# Patient Record
Sex: MALE | Race: WHITE | Employment: OTHER | URBAN - METROPOLITAN AREA
[De-identification: names, ages, dates, MRNs, and addresses within clinical notes are randomized per-mention and may not be internally consistent; named-entity substitution may affect disease eponyms.]

---

## 2017-08-02 PROBLEM — M17.12 PRIMARY OSTEOARTHRITIS OF LEFT KNEE: Status: ACTIVE | Noted: 2017-08-02

## 2017-10-12 ENCOUNTER — TELEPHONE (OUTPATIENT)
Dept: ORTHOPEDIC SURGERY | Age: 67
End: 2017-10-12

## 2018-02-12 ENCOUNTER — TELEPHONE (OUTPATIENT)
Dept: ORTHOPEDIC SURGERY | Age: 68
End: 2018-02-12

## 2018-02-20 RX ORDER — SODIUM CHLORIDE, SODIUM LACTATE, POTASSIUM CHLORIDE, CALCIUM CHLORIDE 600; 310; 30; 20 MG/100ML; MG/100ML; MG/100ML; MG/100ML
INJECTION, SOLUTION INTRAVENOUS CONTINUOUS
Status: CANCELLED | OUTPATIENT
Start: 2018-03-11

## 2018-02-20 RX ORDER — DEXAMETHASONE SODIUM PHOSPHATE 4 MG/ML
10 INJECTION, SOLUTION INTRA-ARTICULAR; INTRALESIONAL; INTRAMUSCULAR; INTRAVENOUS; SOFT TISSUE ONCE
Status: CANCELLED | OUTPATIENT
Start: 2018-03-11 | End: 2018-03-11

## 2018-02-20 RX ORDER — OXYCODONE HCL 10 MG/1
10 TABLET, FILM COATED, EXTENDED RELEASE ORAL
Status: CANCELLED | OUTPATIENT
Start: 2018-03-11 | End: 2018-03-11

## 2018-02-21 ENCOUNTER — HOSPITAL ENCOUNTER (OUTPATIENT)
Dept: PREADMISSION TESTING | Age: 68
Discharge: HOME | End: 2018-02-21
Attending: ORTHOPAEDIC SURGERY | Admitting: ORTHOPAEDIC SURGERY

## 2018-02-21 VITALS
WEIGHT: 223 LBS | HEIGHT: 73 IN | RESPIRATION RATE: 16 BRPM | BODY MASS INDEX: 29.55 KG/M2 | TEMPERATURE: 97.4 F | SYSTOLIC BLOOD PRESSURE: 125 MMHG | DIASTOLIC BLOOD PRESSURE: 79 MMHG | HEART RATE: 54 BPM | OXYGEN SATURATION: 97 %

## 2018-02-21 LAB
ABO/RH: NORMAL
ALBUMIN SERPL-MCNC: 4.6 G/DL (ref 3.4–5)
ALP BLD-CCNC: 61 U/L (ref 40–129)
ALT SERPL-CCNC: 14 U/L (ref 10–40)
ANION GAP SERPL CALCULATED.3IONS-SCNC: 10 MMOL/L (ref 3–16)
ANTIBODY SCREEN: NORMAL
APTT: 33.6 SEC (ref 24.1–34.9)
AST SERPL-CCNC: 19 U/L (ref 15–37)
BASOPHILS ABSOLUTE: 0.1 K/UL (ref 0–0.2)
BASOPHILS RELATIVE PERCENT: 1.8 %
BILIRUB SERPL-MCNC: 0.6 MG/DL (ref 0–1)
BILIRUBIN DIRECT: <0.2 MG/DL (ref 0–0.3)
BILIRUBIN URINE: NEGATIVE
BILIRUBIN, INDIRECT: NORMAL MG/DL (ref 0–1)
BLOOD, URINE: NEGATIVE
BUN BLDV-MCNC: 26 MG/DL (ref 7–20)
CALCIUM SERPL-MCNC: 9.1 MG/DL (ref 8.3–10.6)
CHLORIDE BLD-SCNC: 100 MMOL/L (ref 99–110)
CLARITY: CLEAR
CO2: 28 MMOL/L (ref 21–32)
COLOR: YELLOW
CREAT SERPL-MCNC: 0.7 MG/DL (ref 0.8–1.3)
EOSINOPHILS ABSOLUTE: 0.1 K/UL (ref 0–0.6)
EOSINOPHILS RELATIVE PERCENT: 3.5 %
GFR AFRICAN AMERICAN: >60
GFR NON-AFRICAN AMERICAN: >60
GLUCOSE BLD-MCNC: 96 MG/DL (ref 70–99)
GLUCOSE URINE: NEGATIVE MG/DL
HCT VFR BLD CALC: 46.2 % (ref 40.5–52.5)
HEMOGLOBIN: 15.8 G/DL (ref 13.5–17.5)
INR BLD: 1.06 (ref 0.85–1.15)
KETONES, URINE: NEGATIVE MG/DL
LEUKOCYTE ESTERASE, URINE: NEGATIVE
LYMPHOCYTES ABSOLUTE: 1.6 K/UL (ref 1–5.1)
LYMPHOCYTES RELATIVE PERCENT: 42.4 %
MCH RBC QN AUTO: 31.5 PG (ref 26–34)
MCHC RBC AUTO-ENTMCNC: 34.1 G/DL (ref 31–36)
MCV RBC AUTO: 92.5 FL (ref 80–100)
MICROSCOPIC EXAMINATION: NORMAL
MONOCYTES ABSOLUTE: 0.6 K/UL (ref 0–1.3)
MONOCYTES RELATIVE PERCENT: 14.7 %
NEUTROPHILS ABSOLUTE: 1.4 K/UL (ref 1.7–7.7)
NEUTROPHILS RELATIVE PERCENT: 37.6 %
NITRITE, URINE: NEGATIVE
PDW BLD-RTO: 13.6 % (ref 12.4–15.4)
PH UA: 6
PLATELET # BLD: 215 K/UL (ref 135–450)
PMV BLD AUTO: 7.7 FL (ref 5–10.5)
POTASSIUM SERPL-SCNC: 4.2 MMOL/L (ref 3.5–5.1)
PROTEIN UA: NEGATIVE MG/DL
PROTHROMBIN TIME: 12 SEC (ref 9.6–13)
RBC # BLD: 5 M/UL (ref 4.2–5.9)
SODIUM BLD-SCNC: 138 MMOL/L (ref 136–145)
SPECIFIC GRAVITY UA: 1.02
TOTAL PROTEIN: 7.1 G/DL (ref 6.4–8.2)
URINE TYPE: NORMAL
UROBILINOGEN, URINE: 0.2 E.U./DL
WBC # BLD: 3.8 K/UL (ref 4–11)

## 2018-02-21 RX ORDER — COVID-19 ANTIGEN TEST
220 KIT MISCELLANEOUS DAILY
Status: ON HOLD | COMMUNITY
End: 2018-03-13 | Stop reason: HOSPADM

## 2018-02-21 RX ORDER — M-VIT,TX,IRON,MINS/CALC/FOLIC 27MG-0.4MG
1 TABLET ORAL DAILY
COMMUNITY

## 2018-02-22 LAB
ESTIMATED AVERAGE GLUCOSE: 105.4 MG/DL
HBA1C MFR BLD: 5.3 %
MRSA SCREEN RT-PCR: NORMAL

## 2018-02-22 NOTE — PROGRESS NOTES
Labs and EKG faxed to PCP, Dr. Carl Garcia for review.
The following educational items and goals will be achieved upon completion of the patient's Pre-admission testing experience:             Identify the learner who is being assessed for education:  patient                    Ability to Learn:  Exhibits ability to grasp concepts and respond to questions: High  Ready to Learn: Yes  calm   Preferred Method of Learning:  verbal  Barriers to Learning: Verbalizes interest  Special Considerations due to cultural, Oriental orthodox, spiritual beliefs:  No  Language:  English  :  Ivonne Mo  [x] Appropriate evaluation / integration of data as delineated by ASPAN Standards of Perianesthesia Nursing Practice    Pain scale and pain management   [x]Patient verbalizes understanding of pain scale and pain management  [x]Pre-operative determination of patients anticipated Post-Operative pain goal:   4 of 10 on 10 point scale post op goal  [] Other     Medication(s) - Compliance with preop medication instructions  [x] Patient verbalizes understanding of preop medications (see [facility] Presurgical Instructions)    Instructions, Pre op                                                                                            [x] Patient verbalizes understanding of presurgical instructions as reviewed with phone interview nurse or in-person nurse review    Fall Risk Potential, Preoperatively                                                                                   [x]No preoperative risk identified  []Preop risk identified:                    []Sensory deficit        []Motor deficit        []Balance problem        []Home medication        []Uses assistive device                    []History of a Fall within the last 30 days    Goal(s) for fall prevention:  [x]Prevent fall or injury by requesting assistance with activities of daily living  [x]Patient / Significant other verbalizes understanding the need to call for
these symptoms become severe, or should you notice any signs of infection, you should call your surgeon. 5. Narcotic pain medications can cause significant constipation. You may want to add a stool softener to your postoperative medication schedule or speak to your surgeon on how best to manage this SIDE EFFECT. SPECIAL INSTRUCTIONS     Thank you for allowing us to care for you. We strive to exceed your expectations in the overall delivery of care and service provided to you and your family. If you need to contact us for any reason, please call us at [phone]. Instructions reviewed and copy given to patient during preadmission testing visit. Abigail Watson.2/21/2018 .11:24 AM      ADDITIONAL EDUCATIONAL INFORMATION REVIEWED / PROVIDED TO YOU AND YOUR FAMILY:  Yes Taking Control of Your Pain   Yes FAQs about Surgical Site Infections    No Cardiac Surgery Instructions for AM admission to the hospital  No Bactroban® Nasal Ointment Instructions for Cardiac Surgery  No Learning About Preventing Pressure Sores  No Cardiac Surgery Preoperative Hibiclens® Bathing Instructions  YES / [id number]} Your Care after Heart Surgery Binder    Yes Jak® Wipes Bathing Instructions (Obtained from:  https://www.[employer]/. pdf )  No Hibiclens® Bathing Instructions  No Antibacterial Soap    Yes Incentive Spirometer given to patient- PLEASE BRING THIS SPIROMETER BACK WITH YOU ON THE DAY OF YOUR SURGERY    No CMS Comprehensive Care for Joint Replacement Model Notification Letter  No Your Guide to Hip Replacement Surgery. PLEASE BRING THIS BOOKLET BACK ON THE DAY OF YOUR SURGERY. Yes Your Guide to Knee Replacement Surgery. PLEASE BRING THIS BOOKLET BACK ON THE DAY OF YOUR SURGERY. No Your Guide to Shoulder Replacement Surgery. PLEASE BRING THIS BOOKLET BACK ON THE DAY OF YOUR SURGERY.     No Other

## 2018-02-23 LAB
EKG ATRIAL RATE: 50 BPM
EKG DIAGNOSIS: NORMAL
EKG P AXIS: 54 DEGREES
EKG P-R INTERVAL: 222 MS
EKG Q-T INTERVAL: 446 MS
EKG QRS DURATION: 92 MS
EKG QTC CALCULATION (BAZETT): 406 MS
EKG R AXIS: 21 DEGREES
EKG T AXIS: 39 DEGREES
EKG VENTRICULAR RATE: 50 BPM
URINE CULTURE, ROUTINE: NORMAL

## 2018-02-23 PROCEDURE — 93010 ELECTROCARDIOGRAM REPORT: CPT | Performed by: INTERNAL MEDICINE

## 2018-03-07 ENCOUNTER — TELEPHONE (OUTPATIENT)
Dept: ORTHOPEDIC SURGERY | Age: 68
End: 2018-03-07

## 2018-03-08 NOTE — TELEPHONE ENCOUNTER
I called the patient to tell him Cris would be back on Monday. He stated he wanted to know when his post-op appointment with Dr. Poncho Palumbo is since his surgery got moved to a different day. I did tell him that his appt was 4/2.

## 2018-03-12 PROBLEM — M17.10 OSTEOARTHRITIS, LOCALIZED, KNEE: Status: ACTIVE | Noted: 2018-03-12

## 2018-04-01 ENCOUNTER — HOSPITAL ENCOUNTER (OUTPATIENT)
Dept: PHYSICAL THERAPY | Age: 68
Discharge: HOME | End: 2018-04-30
Attending: ORTHOPAEDIC SURGERY | Admitting: ORTHOPAEDIC SURGERY

## 2018-04-02 ENCOUNTER — HOSPITAL ENCOUNTER (OUTPATIENT)
Dept: PHYSICAL THERAPY | Age: 68
Discharge: HOME | End: 2018-03-31
Admitting: ORTHOPAEDIC SURGERY

## 2018-04-02 ENCOUNTER — HOSPITAL ENCOUNTER (OUTPATIENT)
Dept: PHYSICAL THERAPY | Age: 68
Discharge: HOME | End: 2018-04-03
Admitting: ORTHOPAEDIC SURGERY

## 2018-04-04 ENCOUNTER — TREATMENT (OUTPATIENT)
Dept: PHYSICAL THERAPY | Age: 68
End: 2018-04-04

## 2018-04-04 DIAGNOSIS — M25.462 SWELLING OF JOINT OF LEFT KNEE: ICD-10-CM

## 2018-04-04 DIAGNOSIS — M17.12 ARTHRITIS OF LEFT KNEE: Primary | ICD-10-CM

## 2018-04-04 DIAGNOSIS — M25.562 ACUTE PAIN OF LEFT KNEE: ICD-10-CM

## 2018-04-04 PROCEDURE — 97530 THERAPEUTIC ACTIVITIES: CPT | Performed by: PHYSICAL THERAPIST

## 2018-04-04 PROCEDURE — 97110 THERAPEUTIC EXERCISES: CPT | Performed by: PHYSICAL THERAPIST

## 2018-04-04 PROCEDURE — G8427 DOCREV CUR MEDS BY ELIG CLIN: HCPCS | Performed by: PHYSICAL THERAPIST

## 2018-04-04 PROCEDURE — 97016 VASOPNEUMATIC DEVICE THERAPY: CPT | Performed by: PHYSICAL THERAPIST

## 2018-04-10 ENCOUNTER — TREATMENT (OUTPATIENT)
Dept: PHYSICAL THERAPY | Age: 68
End: 2018-04-10

## 2018-04-10 DIAGNOSIS — M25.462 SWELLING OF JOINT OF LEFT KNEE: ICD-10-CM

## 2018-04-10 DIAGNOSIS — M17.12 ARTHRITIS OF LEFT KNEE: Primary | ICD-10-CM

## 2018-04-10 DIAGNOSIS — M25.562 ACUTE PAIN OF LEFT KNEE: ICD-10-CM

## 2018-04-10 PROCEDURE — 97110 THERAPEUTIC EXERCISES: CPT | Performed by: PHYSICAL THERAPIST

## 2018-04-10 PROCEDURE — 97016 VASOPNEUMATIC DEVICE THERAPY: CPT | Performed by: PHYSICAL THERAPIST

## 2018-04-10 PROCEDURE — 97530 THERAPEUTIC ACTIVITIES: CPT | Performed by: PHYSICAL THERAPIST

## 2018-04-12 ENCOUNTER — TREATMENT (OUTPATIENT)
Dept: PHYSICAL THERAPY | Age: 68
End: 2018-04-12

## 2018-04-12 DIAGNOSIS — M25.462 SWELLING OF JOINT OF LEFT KNEE: ICD-10-CM

## 2018-04-12 DIAGNOSIS — M25.562 ACUTE PAIN OF LEFT KNEE: ICD-10-CM

## 2018-04-12 DIAGNOSIS — M17.12 ARTHRITIS OF LEFT KNEE: Primary | ICD-10-CM

## 2018-04-12 PROCEDURE — 97140 MANUAL THERAPY 1/> REGIONS: CPT | Performed by: PHYSICAL THERAPIST

## 2018-04-12 PROCEDURE — 97110 THERAPEUTIC EXERCISES: CPT | Performed by: PHYSICAL THERAPIST

## 2018-04-12 PROCEDURE — 97530 THERAPEUTIC ACTIVITIES: CPT | Performed by: PHYSICAL THERAPIST

## 2018-04-12 PROCEDURE — 97016 VASOPNEUMATIC DEVICE THERAPY: CPT | Performed by: PHYSICAL THERAPIST

## 2018-04-19 ENCOUNTER — TREATMENT (OUTPATIENT)
Dept: PHYSICAL THERAPY | Age: 68
End: 2018-04-19

## 2018-04-19 DIAGNOSIS — M25.562 ACUTE PAIN OF LEFT KNEE: ICD-10-CM

## 2018-04-19 DIAGNOSIS — M25.462 SWELLING OF JOINT OF LEFT KNEE: ICD-10-CM

## 2018-04-19 DIAGNOSIS — M17.12 ARTHRITIS OF LEFT KNEE: Primary | ICD-10-CM

## 2018-04-19 PROCEDURE — G8427 DOCREV CUR MEDS BY ELIG CLIN: HCPCS | Performed by: PHYSICAL THERAPIST

## 2018-04-19 PROCEDURE — 97110 THERAPEUTIC EXERCISES: CPT | Performed by: PHYSICAL THERAPIST

## 2018-04-19 PROCEDURE — 97016 VASOPNEUMATIC DEVICE THERAPY: CPT | Performed by: PHYSICAL THERAPIST

## 2018-04-19 PROCEDURE — 97530 THERAPEUTIC ACTIVITIES: CPT | Performed by: PHYSICAL THERAPIST

## 2018-04-19 PROCEDURE — 97140 MANUAL THERAPY 1/> REGIONS: CPT | Performed by: PHYSICAL THERAPIST

## 2018-05-01 ENCOUNTER — HOSPITAL ENCOUNTER (OUTPATIENT)
Dept: PHYSICAL THERAPY | Age: 68
Discharge: HOME | End: 2018-05-31
Attending: ORTHOPAEDIC SURGERY | Admitting: ORTHOPAEDIC SURGERY

## 2018-05-01 ENCOUNTER — TREATMENT (OUTPATIENT)
Dept: PHYSICAL THERAPY | Age: 68
End: 2018-05-01

## 2018-05-01 DIAGNOSIS — M17.12 ARTHRITIS OF LEFT KNEE: Primary | ICD-10-CM

## 2018-05-01 DIAGNOSIS — M25.562 ACUTE PAIN OF LEFT KNEE: ICD-10-CM

## 2018-05-01 DIAGNOSIS — M25.462 SWELLING OF JOINT OF LEFT KNEE: ICD-10-CM

## 2018-05-01 PROCEDURE — 97530 THERAPEUTIC ACTIVITIES: CPT | Performed by: PHYSICAL THERAPIST

## 2018-05-01 PROCEDURE — 97140 MANUAL THERAPY 1/> REGIONS: CPT | Performed by: PHYSICAL THERAPIST

## 2018-05-01 PROCEDURE — 97110 THERAPEUTIC EXERCISES: CPT | Performed by: PHYSICAL THERAPIST

## 2018-05-01 PROCEDURE — 97016 VASOPNEUMATIC DEVICE THERAPY: CPT | Performed by: PHYSICAL THERAPIST

## 2018-05-03 ENCOUNTER — TREATMENT (OUTPATIENT)
Dept: PHYSICAL THERAPY | Age: 68
End: 2018-05-03

## 2018-05-03 DIAGNOSIS — M25.562 ACUTE PAIN OF LEFT KNEE: ICD-10-CM

## 2018-05-03 DIAGNOSIS — M25.462 SWELLING OF JOINT OF LEFT KNEE: ICD-10-CM

## 2018-05-03 DIAGNOSIS — M17.12 ARTHRITIS OF LEFT KNEE: Primary | ICD-10-CM

## 2018-05-03 PROCEDURE — 97016 VASOPNEUMATIC DEVICE THERAPY: CPT | Performed by: PHYSICAL THERAPIST

## 2018-05-03 PROCEDURE — G8427 DOCREV CUR MEDS BY ELIG CLIN: HCPCS | Performed by: PHYSICAL THERAPIST

## 2018-05-03 PROCEDURE — 97110 THERAPEUTIC EXERCISES: CPT | Performed by: PHYSICAL THERAPIST

## 2018-05-03 PROCEDURE — 97530 THERAPEUTIC ACTIVITIES: CPT | Performed by: PHYSICAL THERAPIST

## 2018-05-08 ENCOUNTER — TREATMENT (OUTPATIENT)
Dept: PHYSICAL THERAPY | Age: 68
End: 2018-05-08

## 2018-05-08 DIAGNOSIS — M17.12 ARTHRITIS OF LEFT KNEE: Primary | ICD-10-CM

## 2018-05-08 DIAGNOSIS — M25.562 ACUTE PAIN OF LEFT KNEE: ICD-10-CM

## 2018-05-08 DIAGNOSIS — M25.462 SWELLING OF JOINT OF LEFT KNEE: ICD-10-CM

## 2018-05-08 PROCEDURE — 97140 MANUAL THERAPY 1/> REGIONS: CPT | Performed by: PHYSICAL THERAPIST

## 2018-05-08 PROCEDURE — 97110 THERAPEUTIC EXERCISES: CPT | Performed by: PHYSICAL THERAPIST

## 2018-05-08 PROCEDURE — 97530 THERAPEUTIC ACTIVITIES: CPT | Performed by: PHYSICAL THERAPIST

## 2018-05-08 PROCEDURE — 97016 VASOPNEUMATIC DEVICE THERAPY: CPT | Performed by: PHYSICAL THERAPIST

## 2018-05-10 ENCOUNTER — TREATMENT (OUTPATIENT)
Dept: PHYSICAL THERAPY | Age: 68
End: 2018-05-10

## 2018-05-10 DIAGNOSIS — M25.462 SWELLING OF JOINT OF LEFT KNEE: ICD-10-CM

## 2018-05-10 DIAGNOSIS — M25.562 ACUTE PAIN OF LEFT KNEE: ICD-10-CM

## 2018-05-10 DIAGNOSIS — M17.12 ARTHRITIS OF LEFT KNEE: Primary | ICD-10-CM

## 2018-05-10 PROCEDURE — 97110 THERAPEUTIC EXERCISES: CPT | Performed by: PHYSICAL THERAPIST

## 2018-05-10 PROCEDURE — 97530 THERAPEUTIC ACTIVITIES: CPT | Performed by: PHYSICAL THERAPIST

## 2018-05-10 PROCEDURE — 97016 VASOPNEUMATIC DEVICE THERAPY: CPT | Performed by: PHYSICAL THERAPIST

## 2018-05-15 ENCOUNTER — TREATMENT (OUTPATIENT)
Dept: PHYSICAL THERAPY | Age: 68
End: 2018-05-15

## 2018-05-15 DIAGNOSIS — M25.462 SWELLING OF JOINT OF LEFT KNEE: ICD-10-CM

## 2018-05-15 DIAGNOSIS — M17.12 ARTHRITIS OF LEFT KNEE: Primary | ICD-10-CM

## 2018-05-15 DIAGNOSIS — M25.562 ACUTE PAIN OF LEFT KNEE: ICD-10-CM

## 2018-05-15 PROCEDURE — 97016 VASOPNEUMATIC DEVICE THERAPY: CPT | Performed by: PHYSICAL THERAPIST

## 2018-05-15 PROCEDURE — 97112 NEUROMUSCULAR REEDUCATION: CPT | Performed by: PHYSICAL THERAPIST

## 2018-05-15 PROCEDURE — G8427 DOCREV CUR MEDS BY ELIG CLIN: HCPCS | Performed by: PHYSICAL THERAPIST

## 2018-05-15 PROCEDURE — 97110 THERAPEUTIC EXERCISES: CPT | Performed by: PHYSICAL THERAPIST

## 2018-05-17 ENCOUNTER — TREATMENT (OUTPATIENT)
Dept: PHYSICAL THERAPY | Age: 68
End: 2018-05-17

## 2018-05-17 DIAGNOSIS — M17.12 ARTHRITIS OF LEFT KNEE: Primary | ICD-10-CM

## 2018-05-17 DIAGNOSIS — M25.562 ACUTE PAIN OF LEFT KNEE: ICD-10-CM

## 2018-05-17 DIAGNOSIS — M25.462 SWELLING OF JOINT OF LEFT KNEE: ICD-10-CM

## 2018-05-17 PROCEDURE — 97112 NEUROMUSCULAR REEDUCATION: CPT | Performed by: PHYSICAL THERAPIST

## 2018-05-17 PROCEDURE — 97110 THERAPEUTIC EXERCISES: CPT | Performed by: PHYSICAL THERAPIST

## 2018-05-17 PROCEDURE — 97116 GAIT TRAINING THERAPY: CPT | Performed by: PHYSICAL THERAPIST

## 2018-05-17 PROCEDURE — 97016 VASOPNEUMATIC DEVICE THERAPY: CPT | Performed by: PHYSICAL THERAPIST

## 2018-05-23 ENCOUNTER — TREATMENT (OUTPATIENT)
Dept: PHYSICAL THERAPY | Age: 68
End: 2018-05-23

## 2018-05-23 DIAGNOSIS — M17.12 ARTHRITIS OF LEFT KNEE: Primary | ICD-10-CM

## 2018-05-23 DIAGNOSIS — M25.462 SWELLING OF JOINT OF LEFT KNEE: ICD-10-CM

## 2018-05-23 DIAGNOSIS — M25.562 ACUTE PAIN OF LEFT KNEE: ICD-10-CM

## 2018-05-23 PROCEDURE — 97016 VASOPNEUMATIC DEVICE THERAPY: CPT | Performed by: PHYSICAL THERAPIST

## 2018-05-23 PROCEDURE — 97530 THERAPEUTIC ACTIVITIES: CPT | Performed by: PHYSICAL THERAPIST

## 2018-05-23 PROCEDURE — 97112 NEUROMUSCULAR REEDUCATION: CPT | Performed by: PHYSICAL THERAPIST

## 2018-05-23 PROCEDURE — 97110 THERAPEUTIC EXERCISES: CPT | Performed by: PHYSICAL THERAPIST

## 2019-02-11 ENCOUNTER — OFFICE VISIT (OUTPATIENT)
Dept: ORTHOPEDIC SURGERY | Age: 69
End: 2019-02-11
Payer: MEDICARE

## 2019-02-11 VITALS
DIASTOLIC BLOOD PRESSURE: 87 MMHG | HEART RATE: 66 BPM | HEIGHT: 73 IN | WEIGHT: 218 LBS | SYSTOLIC BLOOD PRESSURE: 138 MMHG | BODY MASS INDEX: 28.89 KG/M2

## 2019-02-11 DIAGNOSIS — M17.12 PRIMARY OSTEOARTHRITIS OF LEFT KNEE: ICD-10-CM

## 2019-02-11 DIAGNOSIS — Z96.652 STATUS POST TOTAL LEFT KNEE REPLACEMENT: Primary | ICD-10-CM

## 2019-02-11 PROCEDURE — G8427 DOCREV CUR MEDS BY ELIG CLIN: HCPCS | Performed by: ORTHOPAEDIC SURGERY

## 2019-02-11 PROCEDURE — G8419 CALC BMI OUT NRM PARAM NOF/U: HCPCS | Performed by: ORTHOPAEDIC SURGERY

## 2019-02-11 PROCEDURE — 1036F TOBACCO NON-USER: CPT | Performed by: ORTHOPAEDIC SURGERY

## 2019-02-11 PROCEDURE — 4040F PNEUMOC VAC/ADMIN/RCVD: CPT | Performed by: ORTHOPAEDIC SURGERY

## 2019-02-11 PROCEDURE — 1123F ACP DISCUSS/DSCN MKR DOCD: CPT | Performed by: ORTHOPAEDIC SURGERY

## 2019-02-11 PROCEDURE — 99212 OFFICE O/P EST SF 10 MIN: CPT | Performed by: ORTHOPAEDIC SURGERY

## 2019-02-11 PROCEDURE — G8484 FLU IMMUNIZE NO ADMIN: HCPCS | Performed by: ORTHOPAEDIC SURGERY

## 2019-02-11 PROCEDURE — 1101F PT FALLS ASSESS-DOCD LE1/YR: CPT | Performed by: ORTHOPAEDIC SURGERY

## 2019-02-11 PROCEDURE — 3017F COLORECTAL CA SCREEN DOC REV: CPT | Performed by: ORTHOPAEDIC SURGERY

## 2019-05-06 ENCOUNTER — OFFICE VISIT (OUTPATIENT)
Dept: ORTHOPEDIC SURGERY | Age: 69
End: 2019-05-06
Payer: MEDICARE

## 2019-05-06 VITALS
WEIGHT: 218 LBS | DIASTOLIC BLOOD PRESSURE: 82 MMHG | HEIGHT: 73 IN | SYSTOLIC BLOOD PRESSURE: 122 MMHG | HEART RATE: 77 BPM | BODY MASS INDEX: 28.89 KG/M2

## 2019-05-06 DIAGNOSIS — Z96.659 MECHANICAL FAILURE OF PROSTHETIC KNEE JOINT (HCC): ICD-10-CM

## 2019-05-06 DIAGNOSIS — Z96.652 STATUS POST TOTAL LEFT KNEE REPLACEMENT: ICD-10-CM

## 2019-05-06 DIAGNOSIS — S86.812S: Primary | ICD-10-CM

## 2019-05-06 DIAGNOSIS — T84.018A MECHANICAL FAILURE OF PROSTHETIC KNEE JOINT (HCC): ICD-10-CM

## 2019-05-06 DIAGNOSIS — S86.812S: ICD-10-CM

## 2019-05-06 LAB
BASOPHILS ABSOLUTE: 0.1 K/UL (ref 0–0.2)
BASOPHILS RELATIVE PERCENT: 1.3 %
EOSINOPHILS ABSOLUTE: 0.1 K/UL (ref 0–0.6)
EOSINOPHILS RELATIVE PERCENT: 2.6 %
HCT VFR BLD CALC: 45.7 % (ref 40.5–52.5)
HEMOGLOBIN: 15.5 G/DL (ref 13.5–17.5)
LYMPHOCYTES ABSOLUTE: 1.5 K/UL (ref 1–5.1)
LYMPHOCYTES RELATIVE PERCENT: 29.3 %
MCH RBC QN AUTO: 31.6 PG (ref 26–34)
MCHC RBC AUTO-ENTMCNC: 34 G/DL (ref 31–36)
MCV RBC AUTO: 92.9 FL (ref 80–100)
MONOCYTES ABSOLUTE: 0.6 K/UL (ref 0–1.3)
MONOCYTES RELATIVE PERCENT: 11.8 %
NEUTROPHILS ABSOLUTE: 2.9 K/UL (ref 1.7–7.7)
NEUTROPHILS RELATIVE PERCENT: 55 %
PDW BLD-RTO: 14 % (ref 12.4–15.4)
PLATELET # BLD: 225 K/UL (ref 135–450)
PMV BLD AUTO: 8.2 FL (ref 5–10.5)
RBC # BLD: 4.92 M/UL (ref 4.2–5.9)
SEDIMENTATION RATE, ERYTHROCYTE: 2 MM/HR (ref 0–20)
WBC # BLD: 5.3 K/UL (ref 4–11)

## 2019-05-06 PROCEDURE — 4040F PNEUMOC VAC/ADMIN/RCVD: CPT | Performed by: ORTHOPAEDIC SURGERY

## 2019-05-06 PROCEDURE — 99214 OFFICE O/P EST MOD 30 MIN: CPT | Performed by: ORTHOPAEDIC SURGERY

## 2019-05-06 PROCEDURE — 1036F TOBACCO NON-USER: CPT | Performed by: ORTHOPAEDIC SURGERY

## 2019-05-06 PROCEDURE — G8427 DOCREV CUR MEDS BY ELIG CLIN: HCPCS | Performed by: ORTHOPAEDIC SURGERY

## 2019-05-06 PROCEDURE — G8419 CALC BMI OUT NRM PARAM NOF/U: HCPCS | Performed by: ORTHOPAEDIC SURGERY

## 2019-05-06 PROCEDURE — 3017F COLORECTAL CA SCREEN DOC REV: CPT | Performed by: ORTHOPAEDIC SURGERY

## 2019-05-06 PROCEDURE — 1123F ACP DISCUSS/DSCN MKR DOCD: CPT | Performed by: ORTHOPAEDIC SURGERY

## 2019-05-06 NOTE — PROGRESS NOTES
by mouth nightly , Disp: , Rfl:   losartan-hydrochlorothiazide (HYZAAR) 100-25 MG per tablet, Take 1 tablet by mouth, Disp: , Rfl:   omeprazole (PRILOSEC) 40 MG delayed release capsule, Take 40 mg by mouth, Disp: , Rfl:   doxazosin (CARDURA) 2 MG tablet, Take 2 mg by mouth nightly , Disp: , Rfl:   citalopram (CELEXA) 20 MG tablet, Take 20 mg by mouth nightly , Disp: , Rfl:   aMILoride (MIDAMOR) 5 MG tablet, Take 5 mg by mouth 2 times daily , Disp: , Rfl:   ascorbic acid (VITAMIN C) 1000 MG tablet, Take 1,000 mg by mouth, Disp: , Rfl:     No current facility-administered medications for this visit. -- Ciprofloxacin     --  anxiety   -- Doxycycline     --  anxiety   -- Penicillins -- Rash    VITAL SIGNS:  /87   Pulse 66   Ht 6' 1\" (1.854 m)   Wt 218 lb (98.9 kg)   BMI 28.76 kg/m²   Examination left knee today shows he has no warmth or erythema. He has a small effusion. His incision is nicely healed. He has excellent range of motion 0-130°. He has very lateral patellar tracking, and obvious stretched or failure of his retinaculum. He has an occasional clunk under his patella at about 30-40° of knee flexion. He makes good quadriceps contraction. His calf soft as negative Homans. Is no swelling the left lower extremity. Imaging:   3 views left knee including AP, lateral, and sunrise views were obtained and reviewed today do show lateral subluxation of the patella as well as displacement of his patellar button which is now contained in the notch. Assessment:  1. Left knee medial retinacular tear  2. 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 He reports today that he did have a buckling episode after surgery and had increased pain at that time. We think that this was when his medial retinaculum for or stretched out.   In the interim he has displaced his patellar button. X-rays show today that is contained within the notch, which would certainly explain his occasional mechanical symptoms. We would like to obtain some lab work including CBC, ESR, CRP prior to intervention. But he certainly will need to get on the schedule for revision of patellar button, lateral release, and medial retinacular repair. We may also need to change out the tibial polyethylene if there is any damage. He would like to get this done as soon as possible. He'll need medical clearance prior to this case. We will see him back at the time of surgery. Yesi Fry IV, D.O. Clinical Fellow, [facility]  Date:    5/6/2019      The encounter with Ching Chin was supervised by Dr. Olivia Guillen, who personally examined the patient and reviewed the plan. This dictation was performed with a verbal recognition program (DRAGON) and it was checked for errors. It is possible that there are still dictated errors within this office note. If so, please bring any errors to my attention for an addendum. All efforts were made to ensure that this office note is accurate. Attestation:  I was physically present and performed my own examination of this patient and have discussed the case, including pertinent history and exam findings with the fellow. I agree with the documented assessment and plan. Delano Orozco.  Olivia Guillen MD

## 2019-05-07 LAB — C-REACTIVE PROTEIN: 3.1 MG/L (ref 0–5.1)

## 2019-05-09 ENCOUNTER — TELEPHONE (OUTPATIENT)
Dept: ORTHOPEDIC SURGERY | Age: 69
End: 2019-05-09

## 2019-05-10 NOTE — TELEPHONE ENCOUNTER
Called patient and lm we will schedule him for surgery on 6/4/2019 -- will call patient next week with all details once scheduled

## 2019-05-20 ENCOUNTER — TELEPHONE (OUTPATIENT)
Dept: ORTHOPEDIC SURGERY | Age: 69
End: 2019-05-20

## 2019-05-20 NOTE — TELEPHONE ENCOUNTER
FernandoRiccoshell Mathur [id number]    Date: 6/4/19  Type of SX:  Inpatient  Location: [facility]  CPT: 75324, [id number]   SX area:  knee

## 2019-05-21 ENCOUNTER — OFFICE VISIT (OUTPATIENT)
Dept: ORTHOPEDIC SURGERY | Age: 69
End: 2019-05-21

## 2019-05-21 VITALS
BODY MASS INDEX: 28.89 KG/M2 | WEIGHT: 218 LBS | DIASTOLIC BLOOD PRESSURE: 88 MMHG | HEART RATE: 67 BPM | SYSTOLIC BLOOD PRESSURE: 136 MMHG | HEIGHT: 73 IN

## 2019-05-21 DIAGNOSIS — Z96.659 MECHANICAL FAILURE OF PROSTHETIC KNEE JOINT (HCC): ICD-10-CM

## 2019-05-21 DIAGNOSIS — T84.018A MECHANICAL FAILURE OF PROSTHETIC KNEE JOINT (HCC): ICD-10-CM

## 2019-05-21 DIAGNOSIS — Z96.652 STATUS POST TOTAL LEFT KNEE REPLACEMENT: Primary | ICD-10-CM

## 2019-05-21 PROCEDURE — 99024 POSTOP FOLLOW-UP VISIT: CPT | Performed by: ORTHOPAEDIC SURGERY

## 2019-05-21 NOTE — PROGRESS NOTES
Patient returns today for preop visit for his left patellar button failure. He is scheduled for a patellar button revision. His CBC and sed rate and ESR all well within normal limits. He has no warmth erythema and only a very small effusion. His x-ray functioning fairly well with his lateral riding patella. His patella is not dislocated. ROS: Pertinent items are noted in HPI. No notes on file    Past Medical History:  No date: Hypertension     Past Surgical History:  No date: KNEE ARTHROSCOPY      Comment:  one on right, two on left  No date: SINUS SURGERY    Review of patient's family history indicates:  Problem: Rheum Arthritis      Relation: Mother          Age of Onset: (Not Specified)  Problem: High Blood Pressure      Relation: Father          Age of Onset: (Not Specified)  Problem: Cancer      Relation: Father          Age of Onset: (Not Specified)          Comment: throat      Social History    Socioeconomic History      Marital status: Single      Spouse name: None      Number of children: None      Years of education: None      Highest education level: None    Occupational History      None    Social Needs      Financial resource strain: None      Food insecurity:        Worry: None        Inability: None      Transportation needs:        Medical: None        Non-medical: None    Tobacco Use      Smoking status: Never Smoker      Smokeless tobacco: Never Used    Substance and Sexual Activity      Alcohol use:  Yes        Alcohol/week: 4.2 oz        Types: 7 Glasses of wine per week        Comment: 1-2 glasses wine daily      Drug use: No      Sexual activity: None    Lifestyle      Physical activity:        Days per week: None        Minutes per session: None      Stress: None    Relationships      Social connections:        Talks on phone: None        Gets together: None        Attends Sikh service: None        Active member of club or organization: None        Attends meetings of clubs or

## 2019-05-29 RX ORDER — DEXAMETHASONE SODIUM PHOSPHATE 4 MG/ML
10 INJECTION, SOLUTION INTRA-ARTICULAR; INTRALESIONAL; INTRAMUSCULAR; INTRAVENOUS; SOFT TISSUE ONCE
Status: CANCELLED | OUTPATIENT
Start: 2019-06-04

## 2019-05-29 RX ORDER — SODIUM CHLORIDE, SODIUM LACTATE, POTASSIUM CHLORIDE, CALCIUM CHLORIDE 600; 310; 30; 20 MG/100ML; MG/100ML; MG/100ML; MG/100ML
INJECTION, SOLUTION INTRAVENOUS CONTINUOUS
Status: CANCELLED | OUTPATIENT
Start: 2019-06-04

## 2019-05-29 RX ORDER — OXYCODONE HCL 10 MG/1
10 TABLET, FILM COATED, EXTENDED RELEASE ORAL
Status: CANCELLED | OUTPATIENT
Start: 2019-06-04 | End: 2019-06-04

## 2019-05-30 ENCOUNTER — HOSPITAL ENCOUNTER (OUTPATIENT)
Dept: PREADMISSION TESTING | Age: 69
Discharge: HOME | End: 2019-06-03
Payer: MEDICARE

## 2019-05-30 VITALS
OXYGEN SATURATION: 95 % | RESPIRATION RATE: 18 BRPM | HEART RATE: 64 BPM | TEMPERATURE: 98.2 F | HEIGHT: 73 IN | DIASTOLIC BLOOD PRESSURE: 76 MMHG | BODY MASS INDEX: 29.82 KG/M2 | SYSTOLIC BLOOD PRESSURE: 113 MMHG | WEIGHT: 225 LBS

## 2019-05-30 LAB
ABO/RH: NORMAL
ALBUMIN SERPL-MCNC: 4.4 G/DL (ref 3.4–5)
ALP BLD-CCNC: 67 U/L (ref 40–129)
ALT SERPL-CCNC: 16 U/L (ref 10–40)
ANION GAP SERPL CALCULATED.3IONS-SCNC: 12 MMOL/L (ref 3–16)
ANTIBODY SCREEN: NORMAL
APTT: 32.4 SEC (ref 26–36)
AST SERPL-CCNC: 17 U/L (ref 15–37)
BASOPHILS ABSOLUTE: 0.1 K/UL (ref 0–0.2)
BASOPHILS RELATIVE PERCENT: 2.1 %
BILIRUB SERPL-MCNC: 0.3 MG/DL (ref 0–1)
BILIRUBIN DIRECT: <0.2 MG/DL (ref 0–0.3)
BILIRUBIN URINE: NEGATIVE
BILIRUBIN, INDIRECT: NORMAL MG/DL (ref 0–1)
BLOOD, URINE: NEGATIVE
BUN BLDV-MCNC: 21 MG/DL (ref 7–20)
CALCIUM SERPL-MCNC: 9.3 MG/DL (ref 8.3–10.6)
CHLORIDE BLD-SCNC: 101 MMOL/L (ref 99–110)
CLARITY: CLEAR
CO2: 26 MMOL/L (ref 21–32)
COLOR: YELLOW
CREAT SERPL-MCNC: 0.7 MG/DL (ref 0.8–1.3)
EOSINOPHILS ABSOLUTE: 0.1 K/UL (ref 0–0.6)
EOSINOPHILS RELATIVE PERCENT: 3.7 %
GFR AFRICAN AMERICAN: >60
GFR NON-AFRICAN AMERICAN: >60
GLUCOSE BLD-MCNC: 96 MG/DL (ref 70–99)
GLUCOSE URINE: NEGATIVE MG/DL
HCT VFR BLD CALC: 45.4 % (ref 40.5–52.5)
HEMOGLOBIN: 15.6 G/DL (ref 13.5–17.5)
INR BLD: 0.98 (ref 0.86–1.14)
KETONES, URINE: NEGATIVE MG/DL
LEUKOCYTE ESTERASE, URINE: NEGATIVE
LYMPHOCYTES ABSOLUTE: 1.4 K/UL (ref 1–5.1)
LYMPHOCYTES RELATIVE PERCENT: 38.1 %
MCH RBC QN AUTO: 31.9 PG (ref 26–34)
MCHC RBC AUTO-ENTMCNC: 34.2 G/DL (ref 31–36)
MCV RBC AUTO: 93.2 FL (ref 80–100)
MICROSCOPIC EXAMINATION: NORMAL
MONOCYTES ABSOLUTE: 0.5 K/UL (ref 0–1.3)
MONOCYTES RELATIVE PERCENT: 12 %
NEUTROPHILS ABSOLUTE: 1.7 K/UL (ref 1.7–7.7)
NEUTROPHILS RELATIVE PERCENT: 44.1 %
NITRITE, URINE: NEGATIVE
PDW BLD-RTO: 13.6 % (ref 12.4–15.4)
PH UA: 6 (ref 5–8)
PLATELET # BLD: 211 K/UL (ref 135–450)
PMV BLD AUTO: 7.8 FL (ref 5–10.5)
POTASSIUM SERPL-SCNC: 4.5 MMOL/L (ref 3.5–5.1)
PROTEIN UA: NEGATIVE MG/DL
PROTHROMBIN TIME: 11.2 SEC (ref 9.8–13)
RBC # BLD: 4.88 M/UL (ref 4.2–5.9)
SEDIMENTATION RATE, ERYTHROCYTE: 0 MM/HR (ref 0–20)
SODIUM BLD-SCNC: 139 MMOL/L (ref 136–145)
SPECIFIC GRAVITY UA: 1.01 (ref 1–1.03)
TOTAL PROTEIN: 6.8 G/DL (ref 6.4–8.2)
URINE TYPE: NORMAL
UROBILINOGEN, URINE: 0.2 E.U./DL
WBC # BLD: 3.8 K/UL (ref 4–11)

## 2019-05-30 PROCEDURE — 86900 BLOOD TYPING SEROLOGIC ABO: CPT

## 2019-05-30 PROCEDURE — 80076 HEPATIC FUNCTION PANEL: CPT

## 2019-05-30 PROCEDURE — 85610 PROTHROMBIN TIME: CPT

## 2019-05-30 PROCEDURE — 83036 HEMOGLOBIN GLYCOSYLATED A1C: CPT

## 2019-05-30 PROCEDURE — 87086 URINE CULTURE/COLONY COUNT: CPT

## 2019-05-30 PROCEDURE — 87641 MR-STAPH DNA AMP PROBE: CPT

## 2019-05-30 PROCEDURE — 81003 URINALYSIS AUTO W/O SCOPE: CPT

## 2019-05-30 PROCEDURE — 85652 RBC SED RATE AUTOMATED: CPT

## 2019-05-30 PROCEDURE — 86140 C-REACTIVE PROTEIN: CPT

## 2019-05-30 PROCEDURE — 86850 RBC ANTIBODY SCREEN: CPT

## 2019-05-30 PROCEDURE — 85025 COMPLETE CBC W/AUTO DIFF WBC: CPT

## 2019-05-30 PROCEDURE — 85730 THROMBOPLASTIN TIME PARTIAL: CPT

## 2019-05-30 PROCEDURE — 86901 BLOOD TYPING SEROLOGIC RH(D): CPT

## 2019-05-30 PROCEDURE — 80048 BASIC METABOLIC PNL TOTAL CA: CPT

## 2019-05-30 NOTE — PROGRESS NOTES
Snoring? Do you snore loudly (loud enough to be heard through closed doors, or your bed partner elbows you for snoring at night)? No    Tired? Do you often feel tired, fatigued, or sleepy during the daytime (such as falling asleep during driving)? No    Observed? Has anyone observed you stop breathing or choking/gasping during your sleep? No    Pressure? Do you have or are being treated for high blood pressure? Yes    Neck Size? (measured around Raghus apple)  For male, is your shirt collar 17 inches or larger? For female, is your shirt collar 16 inches or larger? Yes    Age older than 48years old? Yes    Gender = Male  Yes    Body Mass Index more than 35 kg/m2? No    Risk of ELIZABETH Scoring criteria:    [] Low risk:  Yes to 0 - 2 questions    [x] Intermediate risk:  Yes to 3 - 4 questions    [] High risk:  Yes to 5 - 8 questions     Results called to OR Scheduling?   No

## 2019-05-30 NOTE — PROGRESS NOTES
ANYTHING eight hours prior to surgery. May have 8 ounces of water 4 hours prior to surgery (exception would be medication instructions below only)     5. MEDICATIONS    Take the following medications with a SMALL sip of water: Take Omeprazole.  Use your usual dose of inhalers the morning of surgery. BRING your rescue inhaler with you to hospital.    Anesthesia does NOT want you to take insulin the morning of surgery. They will control your blood sugar while you are at the hospital. Please contact your ordering physician for instructions regarding your insulin the night before your procedure. If you have an insulin pump, please keep it set on basal rate. 6. Do not swallow water when brushing teeth. No gum, candy, mints or ice chips. Refrain from smoking or at least decrease the amount. 7. Dress in loose, comfortable clothing appropriate for redressing after your procedure. Do not wear jewelry (including body piercings), make-up (especially NO eye make-up), fingernail polish (NO toenail polish if foot/leg surgery), lotion, powders or metal hairclips. 8. Dentures, glasses, or contacts will need to be removed before your procedure. Bring cases for your glasses, contacts, dentures, or hearing aids to protect them while you are in surgery. 9. If you use a CPAP, please bring it with you on the day of your procedure. 10. We recommend that valuable personal  belongings, such as cash, cell phones, e-tablets or jewelry, be left at home during your stay. The hospital will not be responsible for valuables that are not secured in the hospital safe. However, if your insurance requires a co-pay, you may want to bring a method of payment, i.e. Check or credit card, if you wish to pay your co-pay the day of surgery. 11. If you are to stay overnight, you may bring a bag with personal items. Please have any large items you may need brought in by your family after your arrival to your hospital room.     12. If you have a Living Will or Durable Power of , please bring a copy on the day of your procedure. 15.  With your permission, one family member may accompany you while you are being prepared for surgery. Once you are ready, additional family members may join you. HOW WE KEEP YOU SAFE and WORK TO PREVENT SURGICAL SITE INFECTIONS:  1. Health care workers should always check your ID bracelet to verify your name and birth date. You will be asked many times to state your name, date of birth, and allergies. 2. Health care workers should always clean their hands with soap or alcohol gel before providing care to you. It is okay to ask anyone if they cleaned their hands before they touch you. 3. You will be actively involved in verifying the type of procedure you are having and ensuring the correct surgical site. This will be confirmed multiple times prior to your procedure. Do NOT bria your surgery site UNLESS instructed to by your surgeon. 4. Do not shave or wax for 72 hours prior to procedure near your operative site. Shaving with a razor can irritate your skin and make it easier to develop an infection. On the day of your procedure, any hair that needs to be removed near the surgical site will be clipped by a healthcare worker using a special clippers designed to avoid skin irritation. 5. When you are in the operating room, your surgical site will be cleansed with a special soap, and in most cases, you will be given an antibiotic before the surgery begins. What to expect AFTER YOUR PROCEDURE:  1. Immediately following your procedure, your will be taken to the PACU for the first phase of your recovery. Your nurse will help you recover from any potential side effects of anesthesia, such as extreme drowsiness, changes in your vital signs or breathing patterns. Nausea, headache, muscle aches, or sore throat may also occur after anesthesia.   Your nurse will help you manage these potential side effects. 2. For comfort and safety, arrange to have someone at home with you for the first 24 hours after discharge. 3. You and your family will be given written instructions about your diet, activity, dressing care, medications, and return visits. 4. Once at home, should issues with nausea, pain, or bleeding occur, or should you notice any signs of infection, you should call your surgeon. 5. Always clean your hands before and after caring for your wound. Do not let your family touch your surgery site without cleaning their hands. 6. Narcotic pain medications can cause significant constipation. You may want to add a stool softener to your postoperative medication schedule or speak to your surgeon on how best to manage this SIDE EFFECT. SPECIAL INSTRUCTIONS     Thank you for allowing us to care for you. We strive to exceed your expectations in the overall delivery of care and service provided to you and your family. If you need to contact us for any reason, please call us at [phone]. Instructions reviewed and copy given to patient during preadmission testing visit. Betty Macdonald. 5/30/2019 .11:07 AM      ADDITIONAL EDUCATIONAL INFORMATION REVIEWED / PROVIDED TO YOU AND YOUR FAMILY:  Yes Taking Control of Your Pain   Yes FAQs about Surgical Site Infections  Yes Jak® Wipes Bathing Instructions (Obtained from:  https://www.[employer]/. pdf )  Yes Hibiclens® Bathing Instructions  Yes Incentive Spirometer given to patient- PLEASE BRING THIS SPIROMETER BACK WITH YOU ON THE DAY OF YOUR SURGERY  Yes Your Guide to Knee Replacement Surgery. PLEASE BRING THIS BOOKLET BACK ON THE DAY OF YOUR SURGERY.   Yes  Reviewed/Given handout for [employer]/Class-declines-seen last year

## 2019-05-31 LAB
C-REACTIVE PROTEIN: 1.4 MG/L (ref 0–5.1)
ESTIMATED AVERAGE GLUCOSE: 116.9 MG/DL
HBA1C MFR BLD: 5.7 %
MRSA SCREEN RT-PCR: NORMAL
URINE CULTURE, ROUTINE: NORMAL

## 2019-05-31 NOTE — PROGRESS NOTES
H&P, lab and EKG results faxwd to Dr. Rosa Fontaine for review. Cris at Dr. Natalie Hernandez office notified that clearance by PCP is pending the chest CT results, which is to be done 5-31-19 at [facility].

## 2019-06-03 ENCOUNTER — ANESTHESIA EVENT (OUTPATIENT)
Dept: OPERATING ROOM | Age: 69
End: 2019-06-03
Payer: MEDICARE

## 2019-06-03 NOTE — ANESTHESIA PRE PROCEDURE
Department of Anesthesiology  Preprocedure Note       Name:  Amparo Guerra   Age:  76 y.o.  :  1950                                          MRN:  [id number]         Date:  6/3/2019      Surgeon: Yobany Norwood):  Quang Atkinson MD    Procedure: REVISION LEFT PATELLAR BUTTON POSSIBLE POLY EXCHANGE (Left )    Medications prior to admission:   Prior to Admission medications    Medication Sig Start Date End Date Taking? Authorizing Provider   aspirin 81 MG tablet Take 162 mg by mouth nightly    Historical Provider, MD   diclofenac (VOLTAREN) 75 MG EC tablet Take 1 tablet by mouth 2 times daily 1 tablet by mouth twice a day 18   Quang , MD   Multiple Vitamins-Minerals (THERAPEUTIC MULTIVITAMIN-MINERALS) tablet Take 1 tablet by mouth daily    Historical Provider, MD   metoprolol succinate (TOPROL XL) 50 MG extended release tablet Take 50 mg by mouth nightly  17   Historical Provider, MD   losartan-hydrochlorothiazide (HYZAAR) 100-25 MG per tablet Take 1 tablet by mouth daily  17   Historical Provider, MD   omeprazole (PRILOSEC) 40 MG delayed release capsule Take 40 mg by mouth daily  17   Historical Provider, MD   doxazosin (CARDURA) 2 MG tablet Take 2 mg by mouth nightly  17   Historical Provider, MD   citalopram (CELEXA) 20 MG tablet Take 20 mg by mouth nightly  17   Historical Provider, MD   aMILoride (MIDAMOR) 5 MG tablet Take 5 mg by mouth 2 times daily     Historical Provider, MD   ascorbic acid (VITAMIN C) 1000 MG tablet Take 1,000 mg by mouth daily     Historical Provider, MD       Current medications:    No current facility-administered medications for this encounter.       Current Outpatient Medications   Medication Sig Dispense Refill    aspirin 81 MG tablet Take 162 mg by mouth nightly      diclofenac (VOLTAREN) 75 MG EC tablet Take 1 tablet by mouth 2 times daily 1 tablet by mouth twice a day 60 tablet 2    Multiple Vitamins-Minerals (THERAPEUTIC MULTIVITAMIN-MINERALS) tablet Take 1 tablet by mouth daily      metoprolol succinate (TOPROL XL) 50 MG extended release tablet Take 50 mg by mouth nightly       losartan-hydrochlorothiazide (HYZAAR) 100-25 MG per tablet Take 1 tablet by mouth daily       omeprazole (PRILOSEC) 40 MG delayed release capsule Take 40 mg by mouth daily       doxazosin (CARDURA) 2 MG tablet Take 2 mg by mouth nightly       citalopram (CELEXA) 20 MG tablet Take 20 mg by mouth nightly       aMILoride (MIDAMOR) 5 MG tablet Take 5 mg by mouth 2 times daily       ascorbic acid (VITAMIN C) 1000 MG tablet Take 1,000 mg by mouth daily          Allergies: Allergies   Allergen Reactions    Ciprofloxacin      anxiety    Doxycycline      anxiety    Penicillins Rash       Problem List:    Patient Active Problem List   Diagnosis Code    Primary osteoarthritis of left knee M17.12    Osteoarthritis, localized, knee M17.10       Past Medical History:        Diagnosis Date    AAA (abdominal aortic aneurysm) (HCC)     GERD (gastroesophageal reflux disease)     mild intermittent    Hypertension        Past Surgical History:        Procedure Laterality Date    COLONOSCOPY      JOINT REPLACEMENT      left knee    KNEE ARTHROSCOPY      one on right, two on left    SINUS SURGERY  1980       Social History:    Social History     Tobacco Use    Smoking status: Never Smoker    Smokeless tobacco: Never Used   Substance Use Topics    Alcohol use: Yes     Alcohol/week: 4.2 oz     Types: 7 Glasses of wine per week     Comment: 1-2 glasses wine daily                                Counseling given: Not Answered      Vital Signs (Current): There were no vitals filed for this visit.                                            BP Readings from Last 3 Encounters:   05/30/19 113/76   05/21/19 136/88   05/06/19 122/82       NPO Status:                                                                                 BMI:   Wt Readings from Last 3 Encounters:   05/30/19 225 lb (102.1 kg)   05/21/19 218 lb (98.9 kg)   05/06/19 218 lb (98.9 kg)     There is no height or weight on file to calculate BMI.    CBC:   Lab Results   Component Value Date    WBC 3.8 05/30/2019    RBC 4.88 05/30/2019    HGB 15.6 05/30/2019    HCT 45.4 05/30/2019    MCV 93.2 05/30/2019    RDW 13.6 05/30/2019     05/30/2019       CMP:   Lab Results   Component Value Date     05/30/2019    K 4.5 05/30/2019    K 3.9 03/13/2018     05/30/2019    CO2 26 05/30/2019    BUN 21 05/30/2019    CREATININE 0.7 05/30/2019    GFRAA >60 05/30/2019    LABGLOM >60 05/30/2019    GLUCOSE 96 05/30/2019    PROT 6.8 05/30/2019    CALCIUM 9.3 05/30/2019    BILITOT 0.3 05/30/2019    ALKPHOS 67 05/30/2019    AST 17 05/30/2019    ALT 16 05/30/2019       POC Tests: No results for input(s): POCGLU, POCNA, POCK, POCCL, POCBUN, POCHEMO, POCHCT in the last 72 hours.     Coags:   Lab Results   Component Value Date    PROTIME 11.2 05/30/2019    INR 0.98 05/30/2019    APTT 32.4 05/30/2019       HCG (If Applicable): No results found for: PREGTESTUR, PREGSERUM, HCG, HCGQUANT     ABGs: No results found for: PHART, PO2ART, BWQ3UWJ, QWC8HEZ, BEART, S0QZYVCD     Type & Screen (If Applicable):  No results found for: LABABO, [address]    Anesthesia Evaluation  Patient summary reviewed and Nursing notes reviewed no history of anesthetic complications:   Airway: Mallampati: III  TM distance: >3 FB   Neck ROM: full  Mouth opening: > = 3 FB Dental: normal exam         Pulmonary:Negative Pulmonary ROS and normal exam  breath sounds clear to auscultation                             Cardiovascular:    (+) hypertension: moderate,         Rhythm: regular  Rate: normal           Beta Blocker:  Dose within 24 Hrs         Neuro/Psych:   Negative Neuro/Psych ROS              GI/Hepatic/Renal:   (+) GERD: well controlled,      (-) hiatal hernia       Endo/Other: Negative Endo/Other ROS   (+) : arthritis:., . Abdominal:           Vascular: negative vascular ROS. ROS comment: AAA. Anesthesia Plan      general and regional     ASA 3     (Adductor Canal Block)  Induction: intravenous. MIPS: Postoperative opioids intended. Anesthetic plan and risks discussed with patient. Plan discussed with CRNA.     Attending anesthesiologist reviewed and agrees with Riley Chow MD   6/3/2019

## 2019-06-04 ENCOUNTER — ANESTHESIA (OUTPATIENT)
Dept: OPERATING ROOM | Age: 69
End: 2019-06-04
Payer: MEDICARE

## 2019-06-04 ENCOUNTER — HOSPITAL ENCOUNTER (OUTPATIENT)
Age: 69
Discharge: HOME | End: 2019-06-05
Attending: ORTHOPAEDIC SURGERY | Admitting: ORTHOPAEDIC SURGERY
Payer: MEDICARE

## 2019-06-04 VITALS
SYSTOLIC BLOOD PRESSURE: 95 MMHG | TEMPERATURE: 96.4 F | DIASTOLIC BLOOD PRESSURE: 61 MMHG | RESPIRATION RATE: 18 BRPM | OXYGEN SATURATION: 98 %

## 2019-06-04 DIAGNOSIS — Z96.652 S/P REVISION OF TOTAL KNEE, LEFT: Primary | ICD-10-CM

## 2019-06-04 LAB
ABO/RH: NORMAL
ANTIBODY SCREEN: NORMAL
APPEARANCE FLUID: NORMAL
CELL COUNT FLUID TYPE: NORMAL
CLOT EVALUATION: NORMAL
COLOR FLUID: YELLOW
GLUCOSE BLD-MCNC: 99 MG/DL (ref 70–99)
INR BLD: 1.03 (ref 0.86–1.14)
LYMPHOCYTES, BODY FLUID: 32 %
MACROPHAGE FLUID: 13 %
NEUTROPHIL, FLUID: 5 %
NUCLEATED CELLS FLUID: 390 /CUMM
NUMBER OF CELLS COUNTED FLUID: 100
PERFORMED ON: NORMAL
PROTHROMBIN TIME: 11.7 SEC (ref 9.8–13)
RBC FLUID: 1200 /CUMM
SYNOVIOCYTE: 50 %

## 2019-06-04 PROCEDURE — 6360000002 HC RX W HCPCS: Performed by: ORTHOPAEDIC SURGERY

## 2019-06-04 PROCEDURE — 2580000003 HC RX 258: Performed by: ANESTHESIOLOGY

## 2019-06-04 PROCEDURE — 6360000002 HC RX W HCPCS: Performed by: ANESTHESIOLOGY

## 2019-06-04 PROCEDURE — 3700000001 HC ADD 15 MINUTES (ANESTHESIA): Performed by: ORTHOPAEDIC SURGERY

## 2019-06-04 PROCEDURE — 3600000014 HC SURGERY LEVEL 4 ADDTL 15MIN: Performed by: ORTHOPAEDIC SURGERY

## 2019-06-04 PROCEDURE — 86901 BLOOD TYPING SEROLOGIC RH(D): CPT

## 2019-06-04 PROCEDURE — 97110 THERAPEUTIC EXERCISES: CPT

## 2019-06-04 PROCEDURE — 86850 RBC ANTIBODY SCREEN: CPT

## 2019-06-04 PROCEDURE — 97535 SELF CARE MNGMENT TRAINING: CPT

## 2019-06-04 PROCEDURE — 2500000003 HC RX 250 WO HCPCS: Performed by: NURSE ANESTHETIST, CERTIFIED REGISTERED

## 2019-06-04 PROCEDURE — 2580000003 HC RX 258: Performed by: ORTHOPAEDIC SURGERY

## 2019-06-04 PROCEDURE — 2500000003 HC RX 250 WO HCPCS: Performed by: ORTHOPAEDIC SURGERY

## 2019-06-04 PROCEDURE — 6370000000 HC RX 637 (ALT 250 FOR IP): Performed by: ORTHOPAEDIC SURGERY

## 2019-06-04 PROCEDURE — 2709999900 HC NON-CHARGEABLE SUPPLY: Performed by: ORTHOPAEDIC SURGERY

## 2019-06-04 PROCEDURE — 3600000004 HC SURGERY LEVEL 4 BASE: Performed by: ORTHOPAEDIC SURGERY

## 2019-06-04 PROCEDURE — 94761 N-INVAS EAR/PLS OXIMETRY MLT: CPT

## 2019-06-04 PROCEDURE — 97116 GAIT TRAINING THERAPY: CPT

## 2019-06-04 PROCEDURE — 7100000001 HC PACU RECOVERY - ADDTL 15 MIN: Performed by: ORTHOPAEDIC SURGERY

## 2019-06-04 PROCEDURE — 6360000002 HC RX W HCPCS: Performed by: NURSE ANESTHETIST, CERTIFIED REGISTERED

## 2019-06-04 PROCEDURE — 3700000000 HC ANESTHESIA ATTENDED CARE: Performed by: ORTHOPAEDIC SURGERY

## 2019-06-04 PROCEDURE — 97165 OT EVAL LOW COMPLEX 30 MIN: CPT

## 2019-06-04 PROCEDURE — C1776 JOINT DEVICE (IMPLANTABLE): HCPCS | Performed by: ORTHOPAEDIC SURGERY

## 2019-06-04 PROCEDURE — 85610 PROTHROMBIN TIME: CPT

## 2019-06-04 PROCEDURE — 97161 PT EVAL LOW COMPLEX 20 MIN: CPT

## 2019-06-04 PROCEDURE — 1200000000 HC SEMI PRIVATE

## 2019-06-04 PROCEDURE — 89051 BODY FLUID CELL COUNT: CPT

## 2019-06-04 PROCEDURE — 7100000000 HC PACU RECOVERY - FIRST 15 MIN: Performed by: ORTHOPAEDIC SURGERY

## 2019-06-04 PROCEDURE — 86900 BLOOD TYPING SEROLOGIC ABO: CPT

## 2019-06-04 PROCEDURE — C1713 ANCHOR/SCREW BN/BN,TIS/BN: HCPCS | Performed by: ORTHOPAEDIC SURGERY

## 2019-06-04 PROCEDURE — 94150 VITAL CAPACITY TEST: CPT

## 2019-06-04 PROCEDURE — 64447 NJX AA&/STRD FEMORAL NRV IMG: CPT | Performed by: ANESTHESIOLOGY

## 2019-06-04 DEVICE — RALLY HV AB BONE CEMENT 40 GRAMS
Type: IMPLANTABLE DEVICE | Site: PATELLA | Status: FUNCTIONAL
Brand: RALLY

## 2019-06-04 DEVICE — JOURNEY BCS PATELLA RESURFACING                                    ROUND 32 MM STANDARD
Type: IMPLANTABLE DEVICE | Site: PATELLA | Status: FUNCTIONAL
Brand: JOURNEY

## 2019-06-04 RX ORDER — KETOROLAC TROMETHAMINE 15 MG/ML
15 INJECTION, SOLUTION INTRAMUSCULAR; INTRAVENOUS EVERY 6 HOURS
Status: DISPENSED | OUTPATIENT
Start: 2019-06-04 | End: 2019-06-05

## 2019-06-04 RX ORDER — ROPIVACAINE HYDROCHLORIDE 5 MG/ML
INJECTION, SOLUTION EPIDURAL; INFILTRATION; PERINEURAL
Status: COMPLETED | OUTPATIENT
Start: 2019-06-04 | End: 2019-06-04

## 2019-06-04 RX ORDER — SODIUM CHLORIDE, SODIUM LACTATE, POTASSIUM CHLORIDE, CALCIUM CHLORIDE 600; 310; 30; 20 MG/100ML; MG/100ML; MG/100ML; MG/100ML
INJECTION, SOLUTION INTRAVENOUS CONTINUOUS
Status: DISCONTINUED | OUTPATIENT
Start: 2019-06-04 | End: 2019-06-04

## 2019-06-04 RX ORDER — SODIUM CHLORIDE 9 MG/ML
INJECTION, SOLUTION INTRAVENOUS CONTINUOUS
Status: ACTIVE | OUTPATIENT
Start: 2019-06-04 | End: 2019-06-04

## 2019-06-04 RX ORDER — OXYCODONE HYDROCHLORIDE 5 MG/1
10 TABLET ORAL EVERY 4 HOURS PRN
Status: DISCONTINUED | OUTPATIENT
Start: 2019-06-04 | End: 2019-06-05 | Stop reason: HOSPADM

## 2019-06-04 RX ORDER — LIDOCAINE HYDROCHLORIDE 20 MG/ML
INJECTION, SOLUTION INTRAVENOUS PRN
Status: DISCONTINUED | OUTPATIENT
Start: 2019-06-04 | End: 2019-06-04 | Stop reason: SDUPTHER

## 2019-06-04 RX ORDER — CITALOPRAM 20 MG/1
20 TABLET ORAL NIGHTLY
Status: DISCONTINUED | OUTPATIENT
Start: 2019-06-04 | End: 2019-06-05 | Stop reason: HOSPADM

## 2019-06-04 RX ORDER — POLYETHYLENE GLYCOL 3350 17 G/17G
17 POWDER, FOR SOLUTION ORAL DAILY PRN
Status: DISCONTINUED | OUTPATIENT
Start: 2019-06-04 | End: 2019-06-05 | Stop reason: HOSPADM

## 2019-06-04 RX ORDER — ROCURONIUM BROMIDE 10 MG/ML
INJECTION, SOLUTION INTRAVENOUS PRN
Status: DISCONTINUED | OUTPATIENT
Start: 2019-06-04 | End: 2019-06-04 | Stop reason: SDUPTHER

## 2019-06-04 RX ORDER — FENTANYL CITRATE 50 UG/ML
100 INJECTION, SOLUTION INTRAMUSCULAR; INTRAVENOUS ONCE
Status: COMPLETED | OUTPATIENT
Start: 2019-06-04 | End: 2019-06-04

## 2019-06-04 RX ORDER — MIDAZOLAM HYDROCHLORIDE 1 MG/ML
INJECTION INTRAMUSCULAR; INTRAVENOUS PRN
Status: DISCONTINUED | OUTPATIENT
Start: 2019-06-04 | End: 2019-06-04 | Stop reason: SDUPTHER

## 2019-06-04 RX ORDER — LOSARTAN POTASSIUM AND HYDROCHLOROTHIAZIDE 25; 100 MG/1; MG/1
1 TABLET ORAL DAILY
Status: DISCONTINUED | OUTPATIENT
Start: 2019-06-05 | End: 2019-06-05 | Stop reason: HOSPADM

## 2019-06-04 RX ORDER — ROPIVACAINE HYDROCHLORIDE 5 MG/ML
30 INJECTION, SOLUTION EPIDURAL; INFILTRATION; PERINEURAL ONCE
Status: DISCONTINUED | OUTPATIENT
Start: 2019-06-04 | End: 2019-06-04 | Stop reason: HOSPADM

## 2019-06-04 RX ORDER — DOXAZOSIN 2 MG/1
2 TABLET ORAL NIGHTLY
Status: DISCONTINUED | OUTPATIENT
Start: 2019-06-04 | End: 2019-06-05 | Stop reason: HOSPADM

## 2019-06-04 RX ORDER — DOCUSATE SODIUM 100 MG/1
100 CAPSULE, LIQUID FILLED ORAL 2 TIMES DAILY
Qty: 40 CAPSULE | Refills: 0 | Status: SHIPPED | OUTPATIENT
Start: 2019-06-04 | End: 2019-07-04

## 2019-06-04 RX ORDER — OXYCODONE HCL 10 MG/1
10 TABLET, FILM COATED, EXTENDED RELEASE ORAL
Status: COMPLETED | OUTPATIENT
Start: 2019-06-04 | End: 2019-06-04

## 2019-06-04 RX ORDER — SUCCINYLCHOLINE CHLORIDE 20 MG/ML
INJECTION INTRAMUSCULAR; INTRAVENOUS PRN
Status: DISCONTINUED | OUTPATIENT
Start: 2019-06-04 | End: 2019-06-04 | Stop reason: SDUPTHER

## 2019-06-04 RX ORDER — METOPROLOL SUCCINATE 50 MG/1
50 TABLET, EXTENDED RELEASE ORAL NIGHTLY
Status: DISCONTINUED | OUTPATIENT
Start: 2019-06-04 | End: 2019-06-05 | Stop reason: HOSPADM

## 2019-06-04 RX ORDER — DEXAMETHASONE SODIUM PHOSPHATE 4 MG/ML
6 INJECTION, SOLUTION INTRA-ARTICULAR; INTRALESIONAL; INTRAMUSCULAR; INTRAVENOUS; SOFT TISSUE ONCE
Status: COMPLETED | OUTPATIENT
Start: 2019-06-04 | End: 2019-06-04

## 2019-06-04 RX ORDER — FENTANYL CITRATE 50 UG/ML
25 INJECTION, SOLUTION INTRAMUSCULAR; INTRAVENOUS EVERY 5 MIN PRN
Status: DISCONTINUED | OUTPATIENT
Start: 2019-06-04 | End: 2019-06-04 | Stop reason: HOSPADM

## 2019-06-04 RX ORDER — FENTANYL CITRATE 50 UG/ML
INJECTION, SOLUTION INTRAMUSCULAR; INTRAVENOUS PRN
Status: DISCONTINUED | OUTPATIENT
Start: 2019-06-04 | End: 2019-06-04 | Stop reason: SDUPTHER

## 2019-06-04 RX ORDER — SODIUM CHLORIDE 0.9 % (FLUSH) 0.9 %
10 SYRINGE (ML) INJECTION EVERY 12 HOURS SCHEDULED
Status: DISCONTINUED | OUTPATIENT
Start: 2019-06-04 | End: 2019-06-05 | Stop reason: HOSPADM

## 2019-06-04 RX ORDER — ONDANSETRON 2 MG/ML
4 INJECTION INTRAMUSCULAR; INTRAVENOUS EVERY 6 HOURS PRN
Status: DISCONTINUED | OUTPATIENT
Start: 2019-06-04 | End: 2019-06-05 | Stop reason: HOSPADM

## 2019-06-04 RX ORDER — ONDANSETRON 2 MG/ML
4 INJECTION INTRAMUSCULAR; INTRAVENOUS
Status: DISCONTINUED | OUTPATIENT
Start: 2019-06-04 | End: 2019-06-04 | Stop reason: HOSPADM

## 2019-06-04 RX ORDER — SPIRONOLACTONE 25 MG/1
12.5 TABLET ORAL 2 TIMES DAILY
Status: DISCONTINUED | OUTPATIENT
Start: 2019-06-04 | End: 2019-06-05 | Stop reason: HOSPADM

## 2019-06-04 RX ORDER — OXYCODONE HYDROCHLORIDE 5 MG/1
5 TABLET ORAL EVERY 4 HOURS PRN
Status: DISCONTINUED | OUTPATIENT
Start: 2019-06-04 | End: 2019-06-05 | Stop reason: HOSPADM

## 2019-06-04 RX ORDER — ONDANSETRON 2 MG/ML
INJECTION INTRAMUSCULAR; INTRAVENOUS PRN
Status: DISCONTINUED | OUTPATIENT
Start: 2019-06-04 | End: 2019-06-04 | Stop reason: SDUPTHER

## 2019-06-04 RX ORDER — DOCUSATE SODIUM 100 MG/1
100 CAPSULE, LIQUID FILLED ORAL 2 TIMES DAILY
Status: DISCONTINUED | OUTPATIENT
Start: 2019-06-04 | End: 2019-06-05 | Stop reason: HOSPADM

## 2019-06-04 RX ORDER — ASPIRIN 81 MG/1
81 TABLET ORAL 2 TIMES DAILY WITH MEALS
Status: DISCONTINUED | OUTPATIENT
Start: 2019-06-04 | End: 2019-06-05 | Stop reason: HOSPADM

## 2019-06-04 RX ORDER — FENTANYL CITRATE 0.05 MG/ML
INJECTION, SOLUTION INTRAMUSCULAR; INTRAVENOUS PRN
Status: DISCONTINUED | OUTPATIENT
Start: 2019-06-04 | End: 2019-06-04 | Stop reason: SDUPTHER

## 2019-06-04 RX ORDER — M-VIT,TX,IRON,MINS/CALC/FOLIC 27MG-0.4MG
1 TABLET ORAL DAILY
Status: DISCONTINUED | OUTPATIENT
Start: 2019-06-04 | End: 2019-06-05 | Stop reason: HOSPADM

## 2019-06-04 RX ORDER — PROMETHAZINE HYDROCHLORIDE 25 MG/ML
6.25 INJECTION, SOLUTION INTRAMUSCULAR; INTRAVENOUS
Status: DISCONTINUED | OUTPATIENT
Start: 2019-06-04 | End: 2019-06-04 | Stop reason: HOSPADM

## 2019-06-04 RX ORDER — PANTOPRAZOLE SODIUM 40 MG/1
40 TABLET, DELAYED RELEASE ORAL
Status: DISCONTINUED | OUTPATIENT
Start: 2019-06-05 | End: 2019-06-05 | Stop reason: HOSPADM

## 2019-06-04 RX ORDER — GLYCOPYRROLATE 0.2 MG/ML
INJECTION INTRAMUSCULAR; INTRAVENOUS PRN
Status: DISCONTINUED | OUTPATIENT
Start: 2019-06-04 | End: 2019-06-04 | Stop reason: SDUPTHER

## 2019-06-04 RX ORDER — FENTANYL CITRATE 50 UG/ML
50 INJECTION, SOLUTION INTRAMUSCULAR; INTRAVENOUS EVERY 5 MIN PRN
Status: DISCONTINUED | OUTPATIENT
Start: 2019-06-04 | End: 2019-06-04 | Stop reason: HOSPADM

## 2019-06-04 RX ORDER — OXYCODONE HYDROCHLORIDE AND ACETAMINOPHEN 5; 325 MG/1; MG/1
1-2 TABLET ORAL
Qty: 28 TABLET | Refills: 0 | Status: SHIPPED | OUTPATIENT
Start: 2019-06-04 | End: 2019-06-11

## 2019-06-04 RX ORDER — PROPOFOL 10 MG/ML
INJECTION, EMULSION INTRAVENOUS PRN
Status: DISCONTINUED | OUTPATIENT
Start: 2019-06-04 | End: 2019-06-04 | Stop reason: SDUPTHER

## 2019-06-04 RX ORDER — SODIUM CHLORIDE 0.9 % (FLUSH) 0.9 %
10 SYRINGE (ML) INJECTION PRN
Status: DISCONTINUED | OUTPATIENT
Start: 2019-06-04 | End: 2019-06-05 | Stop reason: HOSPADM

## 2019-06-04 RX ORDER — DEXAMETHASONE SODIUM PHOSPHATE 4 MG/ML
10 INJECTION, SOLUTION INTRA-ARTICULAR; INTRALESIONAL; INTRAMUSCULAR; INTRAVENOUS; SOFT TISSUE ONCE
Status: COMPLETED | OUTPATIENT
Start: 2019-06-04 | End: 2019-06-04

## 2019-06-04 RX ORDER — DEXAMETHASONE SODIUM PHOSPHATE 4 MG/ML
3 INJECTION, SOLUTION INTRA-ARTICULAR; INTRALESIONAL; INTRAMUSCULAR; INTRAVENOUS; SOFT TISSUE EVERY 12 HOURS
Status: DISCONTINUED | OUTPATIENT
Start: 2019-06-05 | End: 2019-06-05 | Stop reason: HOSPADM

## 2019-06-04 RX ORDER — ASPIRIN 81 MG/1
81 TABLET ORAL 2 TIMES DAILY WITH MEALS
Qty: 60 TABLET | Refills: 0 | Status: SHIPPED | OUTPATIENT
Start: 2019-06-04

## 2019-06-04 RX ORDER — MIDAZOLAM HYDROCHLORIDE 1 MG/ML
2 INJECTION INTRAMUSCULAR; INTRAVENOUS ONCE
Status: COMPLETED | OUTPATIENT
Start: 2019-06-04 | End: 2019-06-04

## 2019-06-04 RX ADMIN — DEXAMETHASONE SODIUM PHOSPHATE 10 MG: 4 INJECTION, SOLUTION INTRAMUSCULAR; INTRAVENOUS at 06:54

## 2019-06-04 RX ADMIN — MULTIPLE VITAMINS W/ MINERALS TAB 1 TABLET: TAB at 17:21

## 2019-06-04 RX ADMIN — FENTANYL CITRATE 50 MCG: 50 INJECTION INTRAMUSCULAR; INTRAVENOUS at 07:00

## 2019-06-04 RX ADMIN — LIDOCAINE HYDROCHLORIDE 60 MG: 20 INJECTION, SOLUTION INTRAVENOUS at 07:30

## 2019-06-04 RX ADMIN — ONDANSETRON 4 MG: 2 INJECTION INTRAMUSCULAR; INTRAVENOUS at 08:44

## 2019-06-04 RX ADMIN — SODIUM CHLORIDE: 9 INJECTION, SOLUTION INTRAVENOUS at 13:51

## 2019-06-04 RX ADMIN — PROPOFOL 200 MG: 10 INJECTION, EMULSION INTRAVENOUS at 07:30

## 2019-06-04 RX ADMIN — SODIUM CHLORIDE, SODIUM LACTATE, POTASSIUM CHLORIDE, AND CALCIUM CHLORIDE: 600; 310; 30; 20 INJECTION, SOLUTION INTRAVENOUS at 06:54

## 2019-06-04 RX ADMIN — SODIUM CHLORIDE, SODIUM LACTATE, POTASSIUM CHLORIDE, AND CALCIUM CHLORIDE: 600; 310; 30; 20 INJECTION, SOLUTION INTRAVENOUS at 07:25

## 2019-06-04 RX ADMIN — CITALOPRAM 20 MG: 20 TABLET, FILM COATED ORAL at 20:21

## 2019-06-04 RX ADMIN — FENTANYL CITRATE 100 MCG: 50 INJECTION, SOLUTION INTRAMUSCULAR; INTRAVENOUS at 07:30

## 2019-06-04 RX ADMIN — MIDAZOLAM 2 MG: 1 INJECTION INTRAMUSCULAR; INTRAVENOUS at 06:59

## 2019-06-04 RX ADMIN — VANCOMYCIN HYDROCHLORIDE 1500 MG: 10 INJECTION, POWDER, LYOPHILIZED, FOR SOLUTION INTRAVENOUS at 18:49

## 2019-06-04 RX ADMIN — DOXAZOSIN 2 MG: 2 TABLET ORAL at 20:21

## 2019-06-04 RX ADMIN — SPIRONOLACTONE 12.5 MG: 25 TABLET ORAL at 20:21

## 2019-06-04 RX ADMIN — KETOROLAC TROMETHAMINE 15 MG: 15 INJECTION, SOLUTION INTRAMUSCULAR; INTRAVENOUS at 11:32

## 2019-06-04 RX ADMIN — ASPIRIN 81 MG: 81 TABLET, COATED ORAL at 17:21

## 2019-06-04 RX ADMIN — NEOSTIGMINE METHYLSULFATE 2 MG: 1 INJECTION INTRAMUSCULAR; INTRAVENOUS; SUBCUTANEOUS at 08:48

## 2019-06-04 RX ADMIN — ROCURONIUM BROMIDE 30 MG: 10 INJECTION, SOLUTION INTRAVENOUS at 07:36

## 2019-06-04 RX ADMIN — TRANEXAMIC ACID 1000 MG: 1 INJECTION, SOLUTION INTRAVENOUS at 07:30

## 2019-06-04 RX ADMIN — GLYCOPYRROLATE 0.4 MG: 0.2 INJECTION INTRAMUSCULAR; INTRAVENOUS at 08:48

## 2019-06-04 RX ADMIN — METOPROLOL SUCCINATE 50 MG: 50 TABLET, EXTENDED RELEASE ORAL at 20:21

## 2019-06-04 RX ADMIN — ROPIVACAINE HYDROCHLORIDE 25 ML: 5 INJECTION, SOLUTION EPIDURAL; INFILTRATION; PERINEURAL at 07:03

## 2019-06-04 RX ADMIN — MIDAZOLAM HYDROCHLORIDE 2 MG: 2 INJECTION, SOLUTION INTRAMUSCULAR; INTRAVENOUS at 07:00

## 2019-06-04 RX ADMIN — OXYCODONE HYDROCHLORIDE 10 MG: 10 TABLET, FILM COATED, EXTENDED RELEASE ORAL at 06:18

## 2019-06-04 RX ADMIN — DEXTROSE MONOHYDRATE 1500 MG: 5 INJECTION, SOLUTION INTRAVENOUS at 07:11

## 2019-06-04 RX ADMIN — DOCUSATE SODIUM 100 MG: 100 CAPSULE, LIQUID FILLED ORAL at 20:21

## 2019-06-04 RX ADMIN — DEXAMETHASONE SODIUM PHOSPHATE 6 MG: 4 INJECTION, SOLUTION INTRAMUSCULAR; INTRAVENOUS at 20:21

## 2019-06-04 RX ADMIN — SUCCINYLCHOLINE CHLORIDE 130 MG: 20 INJECTION, SOLUTION INTRAMUSCULAR; INTRAVENOUS; PARENTERAL at 07:30

## 2019-06-04 RX ADMIN — Medication 10 ML: at 20:21

## 2019-06-04 RX ADMIN — KETOROLAC TROMETHAMINE 15 MG: 15 INJECTION, SOLUTION INTRAMUSCULAR; INTRAVENOUS at 22:00

## 2019-06-04 ASSESSMENT — PULMONARY FUNCTION TESTS
PIF_VALUE: 17
PIF_VALUE: 18
PIF_VALUE: 16
PIF_VALUE: 16
PIF_VALUE: 10
PIF_VALUE: 10
PIF_VALUE: 18
PIF_VALUE: 17
PIF_VALUE: 16
PIF_VALUE: 9
PIF_VALUE: 17
PIF_VALUE: 10
PIF_VALUE: 10
PIF_VALUE: 16
PIF_VALUE: 1
PIF_VALUE: 9
PIF_VALUE: 9
PIF_VALUE: 17
PIF_VALUE: 9
PIF_VALUE: 17
PIF_VALUE: 9
PIF_VALUE: 18
PIF_VALUE: 10
PIF_VALUE: 9
PIF_VALUE: 10
PIF_VALUE: 9
PIF_VALUE: 17
PIF_VALUE: 8
PIF_VALUE: 54
PIF_VALUE: 17
PIF_VALUE: 16
PIF_VALUE: 17
PIF_VALUE: 17
PIF_VALUE: 8
PIF_VALUE: 0
PIF_VALUE: 17
PIF_VALUE: 8
PIF_VALUE: 10
PIF_VALUE: 17
PIF_VALUE: 17
PIF_VALUE: 9
PIF_VALUE: 1
PIF_VALUE: 19
PIF_VALUE: 16
PIF_VALUE: 9
PIF_VALUE: 9
PIF_VALUE: 8
PIF_VALUE: 8
PIF_VALUE: 9
PIF_VALUE: 17
PIF_VALUE: 9
PIF_VALUE: 17
PIF_VALUE: 16
PIF_VALUE: 17
PIF_VALUE: 11
PIF_VALUE: 17
PIF_VALUE: 16
PIF_VALUE: 1
PIF_VALUE: 9
PIF_VALUE: 1
PIF_VALUE: 9
PIF_VALUE: 9
PIF_VALUE: 10
PIF_VALUE: 17
PIF_VALUE: 16
PIF_VALUE: 17
PIF_VALUE: 18
PIF_VALUE: 16
PIF_VALUE: 9
PIF_VALUE: 16
PIF_VALUE: 10
PIF_VALUE: 11
PIF_VALUE: 20
PIF_VALUE: 10
PIF_VALUE: 9
PIF_VALUE: 9
PIF_VALUE: 10
PIF_VALUE: 17
PIF_VALUE: 19
PIF_VALUE: 16
PIF_VALUE: 8
PIF_VALUE: 17
PIF_VALUE: 18
PIF_VALUE: 16
PIF_VALUE: 9
PIF_VALUE: 18
PIF_VALUE: 20
PIF_VALUE: 10
PIF_VALUE: 8
PIF_VALUE: 10
PIF_VALUE: 16
PIF_VALUE: 9
PIF_VALUE: 17
PIF_VALUE: 13
PIF_VALUE: 9
PIF_VALUE: 10
PIF_VALUE: 17
PIF_VALUE: 10
PIF_VALUE: 7
PIF_VALUE: 17
PIF_VALUE: 17
PIF_VALUE: 1
PIF_VALUE: 18
PIF_VALUE: 21
PIF_VALUE: 9
PIF_VALUE: 18
PIF_VALUE: 14
PIF_VALUE: 18
PIF_VALUE: 17
PIF_VALUE: 10
PIF_VALUE: 9
PIF_VALUE: 17

## 2019-06-04 ASSESSMENT — PAIN DESCRIPTION - ONSET: ONSET: ON-GOING

## 2019-06-04 ASSESSMENT — PAIN - FUNCTIONAL ASSESSMENT
PAIN_FUNCTIONAL_ASSESSMENT: ACTIVITIES ARE NOT PREVENTED
PAIN_FUNCTIONAL_ASSESSMENT: 0-10

## 2019-06-04 ASSESSMENT — PAIN SCALES - GENERAL
PAINLEVEL_OUTOF10: 0
PAINLEVEL_OUTOF10: 3
PAINLEVEL_OUTOF10: 3
PAINLEVEL_OUTOF10: 0
PAINLEVEL_OUTOF10: 2
PAINLEVEL_OUTOF10: 0
PAINLEVEL_OUTOF10: 0
PAINLEVEL_OUTOF10: 3
PAINLEVEL_OUTOF10: 0
PAINLEVEL_OUTOF10: 3
PAINLEVEL_OUTOF10: 0
PAINLEVEL_OUTOF10: 2

## 2019-06-04 ASSESSMENT — PAIN DESCRIPTION - PAIN TYPE
TYPE: SURGICAL PAIN
TYPE: SURGICAL PAIN

## 2019-06-04 ASSESSMENT — PAIN DESCRIPTION - DESCRIPTORS: DESCRIPTORS: ACHING

## 2019-06-04 ASSESSMENT — PAIN DESCRIPTION - LOCATION
LOCATION: KNEE
LOCATION: KNEE

## 2019-06-04 ASSESSMENT — PAIN DESCRIPTION - PROGRESSION: CLINICAL_PROGRESSION: NOT CHANGED

## 2019-06-04 ASSESSMENT — PAIN DESCRIPTION - FREQUENCY: FREQUENCY: CONTINUOUS

## 2019-06-04 ASSESSMENT — PAIN DESCRIPTION - ORIENTATION
ORIENTATION: LEFT
ORIENTATION: LEFT

## 2019-06-04 NOTE — PROGRESS NOTES
Anesthesia here to do block. O2 placed. Start SCUR:0330  Stop time:0703  Tolerated block without problems    See flow sheet for vital signs.
PACU Transfer Note    Vitals:    06/04/19 1600   BP: 115/78   Pulse: 78   Resp: 12   Temp: 98.3 °F (36.8 °C)   SpO2: 93%       In: 1906 [P.O.:1250;  I.V.:656]  Out: -     Pain assessment:  present - adequately treated  Pain Level: 2    Report given to Receiving unit RN.    6/4/2019 4:02 PM
Patient admitted to PACU # 9 from OR at 0926 post [address]  per Dr. Justus Traylor. Attached to PACU monitoring system and report received from anesthesia provider. Patient was reported to be hemodynamically stable during procedure. Patient awake on admission and denied pain.
for rehabilitation services?: Yes  Additional Pertinent Hx: PMH:  AAA, GERD, HTN  Family / Caregiver Present: No  Referring Practitioner: Gerhardt Leech, DO  Diagnosis: Pt admitted with failure L patellar button, s/p revision L patellar button, poly exchange  Subjective  Subjective: RN cleared pt tx. Pt supine at session start. 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 Objective   Vision: Impaired  Vision Exceptions: Wears glasses for reading  Hearing: Within functional limits      Orientation  Overall Orientation Status: Within Functional Limits        Balance  Sitting Balance: Supervision  Standing Balance: Stand by assistance(with SW)  Functional Mobility  Functional Mobility Comments: Pt walked ~75ft to perform ADL with gait belt, SW, and SBA.      ADL  Feeding: Independent(to drink sitting)  Grooming: Supervision(to wash hands standing)  LE Dressing: Supervision(to don briefs seated and standing after education on technique and to don/doff L sock seated)     Tone RUE  RUE Tone: Normotonic  Tone LUE  LUE Tone: Normotonic  Coordination  Movements Are Fluid And Coordinated: Yes        Bed mobility  Supine to Sit: Supervision  Sit to Supine: Supervision  Scooting: Supervision  Comment: HOB

## 2019-06-04 NOTE — ANESTHESIA PROCEDURE NOTES
Adductor Canal Block    Patient location during procedure: pre-op  Staffing  Anesthesiologist: Stephen Runner, MD  Preanesthetic Checklist  Completed: patient identified, site marked, surgical consent, pre-op evaluation, timeout performed, IV checked, risks and benefits discussed, monitors and equipment checked, anesthesia consent given, oxygen available and patient being monitored  Peripheral Block  Patient position: supine  Prep: ChloraPrep  Patient monitoring: cardiac monitor, continuous pulse ox, frequent blood pressure checks and IV access  Block type: Femoral  Laterality: left  Injection technique: single-shot  Procedures: ultrasound guided  Infiltration strength: 1 %  Adductor canal  Provider prep: mask and sterile gloves  Needle  Needle type: combined needle/nerve stimulator   Needle gauge: 22 G  Needle length: 5 cm  Needle localization: ultrasound guidance  Assessment  Injection assessment: negative aspiration for heme, no paresthesia on injection and local visualized surrounding nerve on ultrasound  Slow fractionated injection: yes  Hemodynamics: stable  Reason for block: post-op pain management

## 2019-06-04 NOTE — ANESTHESIA POSTPROCEDURE EVALUATION
Department of Anesthesiology  Postprocedure Note    Patient: Shauna Boudreaux  MRN: [id number]  YOB: 1950  Date of evaluation: 6/4/2019  Time:  11:02 AM     Procedure Summary     Date:  06/04/19 Room / Location:  [facility] OR [facility] OR    Anesthesia Start:  0725 Anesthesia Stop:      Procedure:  REVISION LEFT PATELLAR BUTTON (Left Knee) Diagnosis:  (FAILURE LEFT PATELALR BUTTON)    Surgeon:  Romayne Ip, MD Responsible Provider:  Tami Kunz MD    Anesthesia Type:  general, regional ASA Status:  3          Anesthesia Type: general, regional    Verónica Phase I: Verónica Score: 9    Verónica Phase II:      Last vitals: Reviewed and per EMR flowsheets.        Anesthesia Post Evaluation    Patient location during evaluation: PACU  Patient participation: complete - patient participated  Level of consciousness: awake and alert  Airway patency: patent  Nausea & Vomiting: no nausea and no vomiting  Complications: no  Cardiovascular status: blood pressure returned to baseline  Respiratory status: acceptable  Hydration status: stable

## 2019-06-04 NOTE — OP NOTE
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 OPERATION PERFORMED:  Revision, left knee patellar button. SURGEON:  Jorge Roper MD    ASSISTANT:  None. INDICATIONS:  This is a 80-year-old, who a little over a year ago, had a  left total knee arthroplasty. He has had a clunk in his knee, but was  functioning very well. Had no fever, chills, sweats, swelling in the  knee, and was quite active outdoors. His flexion was above 130 degrees. He had full extension and there were no problems in the postoperative  period. About two months ago, he said he felt a sudden clunk in his  knee and while he was still thoroughly functional as far as walking and  so on, he felt something had changed in his knee. Examination revealed  very minor effusion, but x-rays revealed that he had displaced his  patellar button. His ESR and CRP were low normal range. He had no  other overt signs of infections. This appeared to be just a mechanical  failure of patellar button. So after discussion of risks, benefits, and  alternatives, the patient wished to proceed with patellar button  revision. OPERATIVE PROCEDURE:  The patient was transported to the operating room  after adductor canal block was placed, general anesthesia was induced, a  padded tourniquet was placed on the thigh, and the leg was prepped and  draped in sterile fashion.   After exsanguination with an

## 2019-06-04 NOTE — H&P
or organization: Not on file     Attends meetings of clubs or organizations: Not on file     Relationship status: Not on file    Intimate partner violence:     Fear of current or ex partner: Not on file     Emotionally abused: Not on file     Physically abused: Not on file     Forced sexual activity: Not on file   Other Topics Concern    Not on file   Social History Narrative    Not on file         Medications Prior to Admission:      Prior to Admission medications    Medication Sig Start Date End Date Taking?  Authorizing Provider   metoprolol succinate (TOPROL XL) 50 MG extended release tablet Take 50 mg by mouth nightly  6/14/17  Yes Historical Provider, MD   losartan-hydrochlorothiazide (HYZAAR) 100-25 MG per tablet Take 1 tablet by mouth daily  6/14/17  Yes Historical Provider, MD   omeprazole (PRILOSEC) 40 MG delayed release capsule Take 40 mg by mouth daily  6/14/17  Yes Historical Provider, MD   doxazosin (CARDURA) 2 MG tablet Take 2 mg by mouth nightly  6/14/17  Yes Historical Provider, MD   citalopram (CELEXA) 20 MG tablet Take 20 mg by mouth nightly  6/14/17  Yes Historical Provider, MD   aMILoride (MIDAMOR) 5 MG tablet Take 5 mg by mouth 2 times daily    Yes Historical Provider, MD   aspirin 81 MG tablet Take 162 mg by mouth nightly    Historical Provider, MD   diclofenac (VOLTAREN) 75 MG EC tablet Take 1 tablet by mouth 2 times daily 1 tablet by mouth twice a day 6/11/18   Rin Daigle MD   Multiple Vitamins-Minerals (THERAPEUTIC MULTIVITAMIN-MINERALS) tablet Take 1 tablet by mouth daily    Historical Provider, MD   ascorbic acid (VITAMIN C) 1000 MG tablet Take 1,000 mg by mouth daily     Historical Provider, MD         Allergies:  Ciprofloxacin; Doxycycline; and Penicillins    PHYSICAL EXAM:      BP (!) 153/90 Comment: 130/83 left arm  Pulse 60   Temp 97.7 °F (36.5 °C) (Oral)   Resp 16   Ht 6' 1\" (1.854 m)   Wt 225 lb (102.1 kg)   SpO2 95%   BMI 29.69 kg/m²      Heart:  regular

## 2019-06-05 VITALS
SYSTOLIC BLOOD PRESSURE: 125 MMHG | TEMPERATURE: 97.4 F | HEIGHT: 73 IN | OXYGEN SATURATION: 97 % | WEIGHT: 225 LBS | HEART RATE: 54 BPM | BODY MASS INDEX: 29.82 KG/M2 | DIASTOLIC BLOOD PRESSURE: 77 MMHG | RESPIRATION RATE: 16 BRPM

## 2019-06-05 PROCEDURE — 97110 THERAPEUTIC EXERCISES: CPT

## 2019-06-05 PROCEDURE — 97116 GAIT TRAINING THERAPY: CPT

## 2019-06-05 PROCEDURE — 2580000003 HC RX 258: Performed by: ORTHOPAEDIC SURGERY

## 2019-06-05 PROCEDURE — 97530 THERAPEUTIC ACTIVITIES: CPT

## 2019-06-05 PROCEDURE — 6360000002 HC RX W HCPCS: Performed by: ORTHOPAEDIC SURGERY

## 2019-06-05 PROCEDURE — 6370000000 HC RX 637 (ALT 250 FOR IP): Performed by: ORTHOPAEDIC SURGERY

## 2019-06-05 RX ADMIN — SPIRONOLACTONE 12.5 MG: 25 TABLET ORAL at 08:56

## 2019-06-05 RX ADMIN — KETOROLAC TROMETHAMINE 15 MG: 15 INJECTION, SOLUTION INTRAMUSCULAR; INTRAVENOUS at 05:18

## 2019-06-05 RX ADMIN — Medication 10 ML: at 08:56

## 2019-06-05 RX ADMIN — DOCUSATE SODIUM 100 MG: 100 CAPSULE, LIQUID FILLED ORAL at 08:56

## 2019-06-05 RX ADMIN — LOSARTAN POTASSIUM AND HYDROCHLOROTHIAZIDE 1 TABLET: 25; 100 TABLET ORAL at 08:56

## 2019-06-05 RX ADMIN — DEXAMETHASONE SODIUM PHOSPHATE 3 MG: 4 INJECTION, SOLUTION INTRAMUSCULAR; INTRAVENOUS at 05:18

## 2019-06-05 RX ADMIN — OXYCODONE HYDROCHLORIDE 5 MG: 5 TABLET ORAL at 03:21

## 2019-06-05 RX ADMIN — ASPIRIN 81 MG: 81 TABLET, COATED ORAL at 08:56

## 2019-06-05 RX ADMIN — PANTOPRAZOLE SODIUM 40 MG: 40 TABLET, DELAYED RELEASE ORAL at 05:17

## 2019-06-05 RX ADMIN — MULTIPLE VITAMINS W/ MINERALS TAB 1 TABLET: TAB at 08:55

## 2019-06-05 ASSESSMENT — PAIN DESCRIPTION - PROGRESSION: CLINICAL_PROGRESSION: NOT CHANGED

## 2019-06-05 ASSESSMENT — PAIN - FUNCTIONAL ASSESSMENT: PAIN_FUNCTIONAL_ASSESSMENT: ACTIVITIES ARE NOT PREVENTED

## 2019-06-05 ASSESSMENT — PAIN SCALES - GENERAL
PAINLEVEL_OUTOF10: 0
PAINLEVEL_OUTOF10: 4
PAINLEVEL_OUTOF10: 0
PAINLEVEL_OUTOF10: 0

## 2019-06-05 ASSESSMENT — PAIN DESCRIPTION - DESCRIPTORS: DESCRIPTORS: ACHING

## 2019-06-05 ASSESSMENT — PAIN DESCRIPTION - ONSET: ONSET: ON-GOING

## 2019-06-05 ASSESSMENT — PAIN DESCRIPTION - ORIENTATION: ORIENTATION: LEFT

## 2019-06-05 ASSESSMENT — PAIN DESCRIPTION - PAIN TYPE: TYPE: SURGICAL PAIN

## 2019-06-05 ASSESSMENT — PAIN DESCRIPTION - FREQUENCY: FREQUENCY: CONTINUOUS

## 2019-06-05 ASSESSMENT — PAIN DESCRIPTION - LOCATION: LOCATION: KNEE

## 2019-06-05 NOTE — PLAN OF CARE
Problem: Pain:  Goal: Pain level will decrease  Description  Pain level will decrease  Outcome: Ongoing   RN assesses pain using 0-10 scale. Patient understands how to rate pain using 0-10 scale. Pain is controled with medication per MAR. RN encourages patient to call out for breakthrough pain. Will continue to monitor and reassess. Problem: Falls - Risk of:  Goal: Will remain free from falls  Description  Will remain free from falls  6/5/2019 0036 by Samantha Maurice RN  Outcome: Ongoing  Patient remains free from falls during this shift. Patient is up x1 person assist with walker. Bed is in the lowest position and the bed and chair alarm is activated. Anti-slip socks are on. Call light is within reach. Will continue to monitor and reassess.

## 2019-06-05 NOTE — CARE COORDINATION
facilitating achievement of predicted outcomes: Family support, Cooperative and Pleasant    Barriers to discharge: none noted     Dallas Avina RN  [facility]  Case Management Department  TS:[phone] MON:[phone]

## 2019-06-05 NOTE — DISCHARGE SUMMARY
organizations: Not on file     Relationship status: Not on file    Intimate partner violence:     Fear of current or ex partner: Not on file     Emotionally abused: Not on file     Physically abused: Not on file     Forced sexual activity: Not on file   Other Topics Concern    Not on file   Social History Narrative    Not on file       Family History:  Family History   Problem Relation Age of Onset    Rheum Arthritis Mother     High Blood Pressure Father     Cancer Father         throat       Vitals:    06/05/19 0315   BP: 114/69   Pulse: 60   Resp: 16   Temp: 98 °F (36.7 °C)   SpO2: 94%     General: No acute distress. Alert and oriented. Neuro: alert. oriented  Eyes: Extra-ocular muscles intact  Mouth: Oral mucosa moist. No perioral lesions  Pulm: Respirations unlabored and regular. Heart: Regular rate and rhythm   Skin: warm, well perfused  MS:  Extremity pain, swelling, and limited ROM. DISCHARGE DIAGNOSES:  Problem List Items Addressed This Visit     * (Principal) S/P revision of total knee, left - Primary    Relevant Medications    oxyCODONE-acetaminophen (PERCOCET) 5-325 MG per tablet          HOSPITAL COURSE:  Aleksandr Michael is a 76 y.o. patient who was admitted to the hospital on the day of surgery. Surgery went as planned. After surgery, the patient was admitted to the Med/Surg unit for postoperative care. Postoperative course was uneventful. The patient tolerated a regular diet and had good pain control on oral pain medication. Discharged occurred on 6/5/19 and they were discharged home in stable condition. DISCHARGE INSTRUCTIONS:  --  Weightbearing as tolerated. --  Do NOT take shower until directed by Dr. Wu Shape office. --  Resume pre-op diet. --  Total joint precautions. --  Take the medicines prescribed for pain. --  Aspirin 81 mg BID x 4 weeks for DVT prophylaxis. --  Resume home medications per PCP.   --  Follow up with orthopaedic surgeon as scheduled   --

## 2019-06-07 ENCOUNTER — TELEPHONE (OUTPATIENT)
Dept: ORTHOPEDIC SURGERY | Age: 69
End: 2019-06-07

## 2019-06-07 NOTE — TELEPHONE ENCOUNTER
LISA AT [address] [facility] CALLED IN STATING HE IS CONFUSED ON THIS PTS DISCHARGE PAPERWORK. PLEASE RETURN HIS CALL [phone].

## 2019-06-18 ENCOUNTER — HOSPITAL ENCOUNTER (OUTPATIENT)
Dept: PHYSICAL THERAPY | Age: 69
Setting detail: THERAPIES SERIES
Discharge: HOME | End: 2019-06-18
Payer: MEDICARE

## 2019-06-18 ENCOUNTER — OFFICE VISIT (OUTPATIENT)
Dept: ORTHOPEDIC SURGERY | Age: 69
End: 2019-06-18

## 2019-06-18 VITALS — WEIGHT: 218 LBS | HEIGHT: 73 IN | BODY MASS INDEX: 28.89 KG/M2

## 2019-06-18 DIAGNOSIS — Z96.659 MECHANICAL FAILURE OF PROSTHETIC KNEE JOINT (HCC): ICD-10-CM

## 2019-06-18 DIAGNOSIS — T84.018A MECHANICAL FAILURE OF PROSTHETIC KNEE JOINT (HCC): ICD-10-CM

## 2019-06-18 DIAGNOSIS — Z96.652 STATUS POST TOTAL LEFT KNEE REPLACEMENT: Primary | ICD-10-CM

## 2019-06-18 PROCEDURE — 97161 PT EVAL LOW COMPLEX 20 MIN: CPT

## 2019-06-18 PROCEDURE — 99024 POSTOP FOLLOW-UP VISIT: CPT | Performed by: ORTHOPAEDIC SURGERY

## 2019-06-18 PROCEDURE — 97112 NEUROMUSCULAR REEDUCATION: CPT

## 2019-06-18 PROCEDURE — 97016 VASOPNEUMATIC DEVICE THERAPY: CPT

## 2019-06-18 PROCEDURE — 97110 THERAPEUTIC EXERCISES: CPT

## 2019-06-18 NOTE — PROGRESS NOTES
Patient returns today 2 weeks status post left knee patellar button revision. He is doing very well. The Aquacel dressing was removed and new Steri-Strips were put on. The wound is clean and dry with no erythema induration. He flexes past 100 degrees already. He gets out to full extension. He is a mild effusion so we counseled him to wear Cushing more frequently. His has a negative Homans. He has good patellar tracking with range of motion. He makes good quadriceps contraction. ROS: Pertinent items are noted in HPI. No notes on file    Past Medical History:  No date: AAA (abdominal aortic aneurysm) (HCC)  No date: GERD (gastroesophageal reflux disease)      Comment:  mild intermittent  No date: Hypertension     Past Surgical History:  No date: COLONOSCOPY  No date: JOINT REPLACEMENT      Comment:  left knee  No date: KNEE ARTHROSCOPY      Comment:  one on right, two on left  6/4/2019: REVISION TOTAL KNEE ARTHROPLASTY;  Left      Comment:  REVISION LEFT PATELLAR BUTTON performed by Liza Coleman MD at [address] [place] [address] Drive: SINUS SURGERY    Review of patient's family history indicates:  Problem: Rheum Arthritis      Relation: Mother          Age of Onset: (Not Specified)  Problem: High Blood Pressure      Relation: Father          Age of Onset: (Not Specified)  Problem: Cancer      Relation: Father          Age of Onset: (Not Specified)          Comment: throat      Social History    Socioeconomic History      Marital status: Single      Spouse name: None      Number of children: None      Years of education: None      Highest education level: None    Occupational History      None    Social Needs      Financial resource strain: None      Food insecurity:        Worry: None        Inability: None      Transportation needs:        Medical: None        Non-medical: None    Tobacco Use      Smoking status: Never Smoker      Smokeless tobacco: Never Used    Substance and Sexual Activity Alcohol use:  Yes        Alcohol/week: 4.2 oz        Types: 7 Glasses of wine per week        Comment: 1-2 glasses wine daily      Drug use: No      Sexual activity: None    Lifestyle      Physical activity:        Days per week: None        Minutes per session: None      Stress: None    Relationships      Social connections:        Talks on phone: None        Gets together: None        Attends [employer] service: None        Active member of club or organization: None        Attends meetings of clubs or organizations: None        Relationship status: None      Intimate partner violence:        Fear of current or ex partner: None        Emotionally abused: None        Physically abused: None        Forced sexual activity: None    Other Topics      Concerns:        None    Social History Narrative      None      Current Outpatient Medications:  aspirin EC 81 MG EC tablet, Take 1 tablet by mouth 2 times daily (with meals), Disp: 60 tablet, Rfl: 0  docusate sodium (COLACE) 100 MG capsule, Take 1 capsule by mouth 2 times daily, Disp: 40 capsule, Rfl: 0  diclofenac (VOLTAREN) 75 MG EC tablet, Take 1 tablet by mouth 2 times daily 1 tablet by mouth twice a day, Disp: 60 tablet, Rfl: 2  Multiple Vitamins-Minerals (THERAPEUTIC MULTIVITAMIN-MINERALS) tablet, Take 1 tablet by mouth daily, Disp: , Rfl:   metoprolol succinate (TOPROL XL) 50 MG extended release tablet, Take 50 mg by mouth nightly , Disp: , Rfl:   losartan-hydrochlorothiazide (HYZAAR) 100-25 MG per tablet, Take 1 tablet by mouth daily , Disp: , Rfl:   omeprazole (PRILOSEC) 40 MG delayed release capsule, Take 40 mg by mouth daily , Disp: , Rfl:   doxazosin (CARDURA) 2 MG tablet, Take 2 mg by mouth nightly , Disp: , Rfl:   citalopram (CELEXA) 20 MG tablet, Take 20 mg by mouth nightly , Disp: , Rfl:   aMILoride (MIDAMOR) 5 MG tablet, Take 5 mg by mouth 2 times daily , Disp: , Rfl:   ascorbic acid (VITAMIN C) 1000 MG tablet, Take 1,000 mg by mouth daily , Disp: , Rfl: No current facility-administered medications for this visit. -- Ciprofloxacin     --  anxiety   -- Doxycycline     --  anxiety   -- Penicillins -- Rash    VITAL SIGNS:  Ht 6' 1\" (1.854 m)   Wt 218 lb (98.9 kg)   BMI 28.76 kg/m²   Impression: Doing well 2 weeks status post total button revision left knee. Plan: He will continue physical therapy. We will see him back in a month. He is taking aspirin.

## 2019-06-18 NOTE — FLOWSHEET NOTE
[facility]    [facility] Therapy Daily Treatment Note  Date:  2019    Patient Name:  Shirlene Rosado    :  1950  MRN: [id number]  Restrictions/Precautions:    Medical/Treatment Diagnosis Information:  Diagnosis: D96.545 (ICD-10-CM) - Status post total left knee replacement  Treatment Diagnosis: M25.562 Pain in Left Knee  Insurance/Certification information:  PT Insurance Information: [employer] Med Complete  Physician Information:  Referring Practitioner: Moshe Nava MD  Plan of care signed (Y/N):     Date of Patient follow up with Physician:     G-Code (if applicable):      Date G-Code Applied:  19  LEFS: 11.25% deficit   LEFS: 11.25% deficit    Progress Note: [x]  Yes  []  No  Next due by: Visit #10       Latex Allergy:  [x]NO      []YES  Preferred Language for Healthcare:   [x]English       []other:    Visit # Insurance Allowable   1   UNL     Pain level:  0-2/10     SUBJECTIVE:  See eval       OBJECTIVE:   Flexibility   L R Comment   Hamstring Moderate restriction     Gastroc Moderate restriction     ITB      Quad              ROM   PROM AROM Overpressure Comment    L R L R L R    Flexion   100 WNL      Extension   -4 (lacking 4 degrees) 0                              Strength    *Not tested due to recent surgery L R Comment   Quad Quad contraction     Hamstring      Gastroc      Hip  flexion      Hip abd                      Special Test    *Not tested due to recent surgery Results/Comment   Meniscal Click    Crepitus    Flexion Test    Valgus Laxity    Varus Laxity    Lachmans    Drop Back    Homans negative           Girth   L R   Mid Patella 45 cm 41 cm   Suprapatellar 46 cm 42 cm   5cm above 45.5 cm 43 cm   15cm above       Reflexes/Sensation:     [x]Dermatomes/Myotomes intact    []Reflexes equal and normal bilaterally   []Other:    Joint mobility: 6/18   [x]Normal    []Hypo   []Hyper    Palpation: Slight palpation over incision  6/18    Functional Mobility/Transfers: Independent with self care and ADL's with increased time required; unable to ascend/descend stairs in normalized reciprocal gait pattern; unable to walk distances greater than 1/2 mile; unable to stand longer than 1 hour  6/18     Posture: Forward head and rounded shoulders  6/18    Bandages/Dressings/Incisions: Bandage applied over incision; slight drainage present on bandage  6/18    Gait: (include devices/WB status) Slight Antalgic gait present with use of unilateral cane  6/18                        [x] Patient history, allergies, meds reviewed. Medical chart reviewed. See intake form. 6/18    Review Of Systems (ROS):  [x]Performed Review of systems (Integumentary, CardioPulmonary, Neurological) by intake and observation. Intake form has been scanned into medical record. Patient has been instructed to contact their primary care physician regarding ROS issues if not already being addressed at this time.   6/18     RESTRICTIONS/PRECAUTIONS: L TKA revision (patellar button)  6/18/19    Exercises/Interventions:   Exercise/Equipment Resistance/Repetitions Other comments   Stretching     Hamstring 30 sec x 5    Towel Pull 30 sec x 5    Inclined Calf     Hip Flexion     ITB     Groin     Quad                    SLR     Supine 3 x 10    Abduction 3 x 10    Adduction     Prone     SLR+          Isometrics     Quad sets 10 sec x 10         Patellar Glides     Medial     Superior     Inferior          ROM     Sheet Pulls  NPV   Hang Weights     Passive     Active     Weight Shift     Ankle Pumps                    CKC     Calf raises     Wall sits     Step ups     1 leg stand     Squatting     CC TKE     Balance     bridges          PRE     Extension  RANGE:   Flexion  RANGE:        Quantum machines     Leg press      Leg extension     Leg curl          Manual interventions allowing for proper ROM for normal function with self care, mobility, lifting and ambulation. Modalities:      Charges:  Timed Code Treatment Minutes: 30 + EVAL   Total Treatment Minutes: 80       [x] EVAL (LOW) 35276 (typically 20 minutes face-to-face)  [] EVAL (MOD) 22480 (typically 30 minutes face-to-face)  [] EVAL (HIGH) 34207 (typically 45 minutes face-to-face)  [] RE-EVAL   [x] PL(79254) x  1   [] IONTO  [x] NMR (79458) x  1   [x] VASO 15 min  6/18  [] Manual (00814) x       [] Other:  [] TA x       [] Mech Traction (79426)  [] ES(attended) (46770)      [] ES (un) (64101):     GOALS:   Patient stated goal: Be able to go up stairs in normalized reciprocal gait pattern; Return to [employer]    Therapist goals for Patient:   Short Term Goals: To be achieved in: 2 weeks  1. Independent in HEP and progression per patient tolerance, in order to prevent re-injury. 2. Patient will have a decrease in pain to facilitate improvement in movement, function, and ADLs as indicated by Functional Deficits. Long Term Goals: To be achieved in:6-8 weeks  1. Disability index score of 10% or less for the LEFS to assist with reaching prior level of function. 2. Patient will demonstrate increased AROM to WNL to allow for proper joint functioning as indicated by patients Functional Deficits. 3. Patient will demonstrate an increase in Strength to good proximal hip strength and control in LE to allow for proper functional mobility as indicated by patients Functional Deficits. 4. Patient will return to Independent functional activities without increased symptoms or restriction. 5. Patient will report ascending/descending flight of stairs with a normalized gait pattern. Progression Towards Functional goals:  [] Patient is progressing as expected towards functional goals listed. [] Progression is slowed due to complexities listed. [] Progression has been slowed due to co-morbidities.   [x] Plan just implemented, too soon to assess goals progression  [] Other:     ASSESSMENT:  See eval    Treatment/Activity Tolerance:  [x] Patient tolerated treatment well [] Patient limited by fatique  [] Patient limited by pain  [] Patient limited by other medical complications  [] Other:     Patient education:   6/18 HEP provided and reviewed with patient    Prognosis: [x] Good [] Fair  [] Poor    Patient Requires Follow-up: [x] Yes  [] No    PLAN: See eval  [] Continue per plan of care [] Alter current plan (see comments)  [x] Plan of care initiated [] Hold pending MD visit [] Discharge    Electronically signed by: Jacqueline Najera PT, DPT

## 2019-06-18 NOTE — PLAN OF CARE
The [address] [place] [address] and Lawanda Hernandeze                                                       Physical Therapy Certification    Dear Referring Practitioner: Vani Coy MD,    We had the pleasure of evaluating the following patient for physical therapy services at [address] [place] [address]. A summary of our findings can be found in the initial assessment below. This includes our plan of care. If you have any questions or concerns regarding these findings, please do not hesitate to contact me at the office phone number checked above. Thank you for the referral.       Physician Signature:_______________________________Date:__________________  By signing above (or electronic signature), therapists plan is approved by physician      Patient: Misty Morales   : 1950   MRN: [id number]  Referring Physician: Referring Practitioner: Vani Coy MD      Evaluation Date: 2019      Medical Diagnosis Information:  Diagnosis: Z92.554 (ICD-10-CM) - Status post total left knee replacement   Treatment Diagnosis: M25.562 Pain in Left Knee                                         Insurance information: PT Insurance Information: [employer]     Precautions/ Contra-indications:   Latex Allergy:  [x]NO      []YES  Preferred Language for Healthcare:   [x]English       []other:    SUBJECTIVE: Patient stated complaint: Patient presents status-post L TKA Revision 2 weeks ago. He states that he has a previous TKA in 2018 and was doing really well. He states that about three months after surgery he noticed that he was having pain in his kneecap. He states that he went back and saw MD but it was too early from original surgery to perform radiographs or perform surgery.  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 He states that he had surgery performed on 6/4/18 to revise the patellar button. OPERATION PERFORMED:  Revision, left knee patellar button. Relevant Medical History: L TKA   Functional Disability Index:   LEFS: 71/80= 11.25% deficit    Pain Scale: 0-2/10  Easing factors: Resting; icing; medication  Provocative factors: ascending;descending stairs; squatting    Type: []Constant   [x]Intermittent  []Radiating []Localized []other:     Numbness/Tingling: Denies    Occupation/School: Retired    Living Status/Prior Level of Function: Independent with ADLs and IADLs, Hunting; Fishing    OBJECTIVE:      Flexibility  6/18 L R Comment   Hamstring Moderate restriction     Gastroc Moderate restriction     ITB      Quad              ROM  6/18 PROM AROM Overpressure Comment    L R L R L R    Flexion   100 WNL      Extension   -4 (lacking 4 degrees) 0                              Strength  6/18  *Not tested due to recent surgery L R Comment   Quad Quad contraction     Hamstring      Gastroc      Hip  flexion      Hip abd                      Special Test  6/18  *Not tested due to recent surgery Results/Comment   Meniscal Click    Crepitus    Flexion Test    Valgus Laxity    Varus Laxity    Lachmans    Drop Back    Homans negative           Girth  6/18 L R   Mid Patella 45 cm 41 cm   Suprapatellar 46 cm 42 cm   5cm above 45.5 cm 43 cm   15cm above       Reflexes/Sensation:  6/18   [x]Dermatomes/Myotomes intact    []Reflexes equal and normal bilaterally   []Other:    Joint mobility: 6/18   [x]Normal    []Hypo   []Hyper    Palpation: Slight palpation over incision  6/18    Functional Mobility/Transfers: Independent with self care and ADL's with increased time required; unable to ascend/descend stairs in normalized reciprocal gait pattern; unable to walk distances greater than 1/2 mile; unable to stand longer than 1 hour  6/18     Posture:  Forward head and rounded shoulders 6/18    Bandages/Dressings/Incisions: Bandage applied over incision; slight drainage present on bandage  6/18    Gait: (include devices/WB status) Slight Antalgic gait present with use of unilateral cane  6/18                        [x] Patient history, allergies, meds reviewed. Medical chart reviewed. See intake form. 6/18    Review Of Systems (ROS):  [x]Performed Review of systems (Integumentary, CardioPulmonary, Neurological) by intake and observation. Intake form has been scanned into medical record. Patient has been instructed to contact their primary care physician regarding ROS issues if not already being addressed at this time.   6/18    Co-morbidities/Complexities (which will affect course of rehabilitation):   [x]None           Arthritic conditions   []Rheumatoid arthritis (M05.9)  []Osteoarthritis (M19.91)   Cardiovascular conditions   []Hypertension (I10)  []Hyperlipidemia (E78.5)  []Angina pectoris (I20)  []Atherosclerosis (I70)   Musculoskeletal conditions   []Disc pathology   []Congenital spine pathologies   []Prior surgical intervention  []Osteoporosis (M81.8)  []Osteopenia (M85.8)   Endocrine conditions   []Hypothyroid (E03.9)  []Hyperthyroid Gastrointestinal conditions   []Constipation (G56.53)   Metabolic conditions   []Morbid obesity (E66.01)  []Diabetes type 1(E10.65) or 2 (E11.65)   []Neuropathy (G60.9)     Pulmonary conditions   []Asthma (J45)  []Coughing   []COPD (J44.9)   Psychological Disorders  []Anxiety (F41.9)  []Depression (F32.9)   []Other:   []Other:          Barriers to/and or personal factors that will affect rehab potential:              [x]Age  [x]Sex              [x]Motivation/Lack of Motivation                        []Co-Morbidities              []Cognitive Function, education/learning barriers              []Environmental, home barriers              []profession/work barriers  [x]past PT/medical experience  []other:  Justification:     Falls Risk Assessment (30 days):   [x] Falls Risk assessed and no intervention required. [] Falls Risk assessed and Patient requires intervention due to being higher risk   TUG score (>12s at risk):     [] Falls education provided, including       G-Codes:   LEFS: 11.25% deficit    ASSESSMENT:   Functional Impairments:     []Noted lumbar/proximal hip/LE hypomobility   [x]Decreased LE functional ROM   [x]Decreased core/proximal hip strength and neuromuscular control   [x]Decreased LE functional strength   [x]Reduced balance/proprioceptive control   []other:      Functional Activity Limitations (from functional questionnaire and intake)   [x]Reduced ability to tolerate prolonged functional positions   [x]Reduced ability or difficulty with changes of positions or transfers between positions   [x]Reduced ability to maintain good posture and demonstrate good body mechanics with sitting, bending, and lifting   []Reduced ability to sleep   [x] Reduced ability or tolerance with driving and/or computer work   [x]Reduced ability to perform lifting, carrying tasks   [x]Reduced ability to squat   [x]Reduced ability to forward bend   [x]Reduced ability to ambulate prolonged functional periods/distances/surfaces   [x]Reduced ability to ascend/descend stairs   [x]Reduced ability to run, hop or jump   []other:     Participation Restrictions   []Reduced participation in self care activities   [x]Reduced participation in home management activities   []Reduced participation in work activities   [x]Reduced participation in social activities. []Reduced participation in sport activities. Classification :    [x]Signs/symptoms consistent with post-surgical status including decreased ROM, strength and function.    []Signs/symptoms consistent with joint sprain/strain   []Signs/symptoms consistent with patella-femoral syndrome   []Signs/symptoms consistent with knee OA/hip OA   []Signs/symptoms consistent with internal derangement of knee/Hip   []Signs/symptoms consistent with functional hip weakness/NMR control      []Signs/symptoms consistent with tendinitis/tendinosis    []signs/symptoms consistent with pathology which may benefit from Dry needling      []other:      Prognosis/Rehab Potential:      []Excellent   [x]Good    []Fair   []Poor    Tolerance of evaluation/treatment:    []Excellent   [x]Good    []Fair   []Poor    Physical Therapy Evaluation Complexity Justification  [x] A history of present problem with:  [x] no personal factors and/or comorbidities that impact the plan of care;  []1-2 personal factors and/or comorbidities that impact the plan of care  []3 personal factors and/or comorbidities that impact the plan of care  [x] An examination of body systems using standardized tests and measures addressing any of the following: body structures and functions (impairments), activity limitations, and/or participation restrictions;:  [] a total of 1-2 or more elements   [x] a total of 3 or more elements   [] a total of 4 or more elements   [x] A clinical presentation with:  [x] stable and/or uncomplicated characteristics   [] evolving clinical presentation with changing characteristics  [] unstable and unpredictable characteristics;   [x] Clinical decision making of [x] low, [] moderate, [] high complexity using standardized patient assessment instrument and/or measurable assessment of functional outcome. [x] EVAL (LOW) 32651 (typically 20 minutes face-to-face)  [] EVAL (MOD) 62464 (typically 30 minutes face-to-face)  [] EVAL (HIGH) 47324 (typically 45 minutes face-to-face)  [] RE-EVAL       PLAN  Frequency/Duration:  1-2 days per week for 6-8 Weeks:  Interventions:  [x]  Therapeutic exercise including: strength training, ROM, for Lower extremity and core   [x]  NMR activation and proprioception for LE, Glutes and Core   [x]  Manual therapy as indicated for LE, Hip and spine to include: Dry Needling/IASTM, STM, PROM, Gr I-IV mobilizations, manipulation.    [x] Modalities as needed that may include: thermal agents, E-stim, Biofeedback, US, iontophoresis as indicated  [x] Patient education on joint protection, postural re-education, activity modification, progression of HEP. HEP instruction: Provided and reviewed with patient (see scanned forms)    GOALS:  Patient stated goal: Be able to go up stairs in normalized reciprocal gait pattern; Return to [employer]    Therapist goals for Patient:   Short Term Goals: To be achieved in: 2 weeks  1. Independent in HEP and progression per patient tolerance, in order to prevent re-injury. 2. Patient will have a decrease in pain to facilitate improvement in movement, function, and ADLs as indicated by Functional Deficits. Long Term Goals: To be achieved in:6-8 weeks  1. Disability index score of 10% or less for the LEFS to assist with reaching prior level of function. 2. Patient will demonstrate increased AROM to WNL to allow for proper joint functioning as indicated by patients Functional Deficits. 3. Patient will demonstrate an increase in Strength to good proximal hip strength and control in LE to allow for proper functional mobility as indicated by patients Functional Deficits. 4. Patient will return to Independent functional activities without increased symptoms or restriction. 5. Patient will report ascending/descending flight of stairs with a normalized gait pattern.      Electronically signed by:  Anthony White PT, DPT

## 2019-06-20 ENCOUNTER — TREATMENT (OUTPATIENT)
Dept: PHYSICAL THERAPY | Age: 69
End: 2019-06-20
Payer: MEDICARE

## 2019-06-20 DIAGNOSIS — M25.562 ACUTE PAIN OF LEFT KNEE: ICD-10-CM

## 2019-06-20 DIAGNOSIS — M17.12 ARTHRITIS OF LEFT KNEE: Primary | ICD-10-CM

## 2019-06-20 DIAGNOSIS — M25.462 SWELLING OF JOINT OF LEFT KNEE: ICD-10-CM

## 2019-06-20 PROCEDURE — G8427 DOCREV CUR MEDS BY ELIG CLIN: HCPCS | Performed by: PHYSICAL THERAPIST

## 2019-06-20 PROCEDURE — 97112 NEUROMUSCULAR REEDUCATION: CPT | Performed by: PHYSICAL THERAPIST

## 2019-06-20 PROCEDURE — 97016 VASOPNEUMATIC DEVICE THERAPY: CPT | Performed by: PHYSICAL THERAPIST

## 2019-06-20 PROCEDURE — 97530 THERAPEUTIC ACTIVITIES: CPT | Performed by: PHYSICAL THERAPIST

## 2019-06-20 PROCEDURE — 97110 THERAPEUTIC EXERCISES: CPT | Performed by: PHYSICAL THERAPIST

## 2019-06-20 NOTE — FLOWSHEET NOTE
Quad sets 10x10\"         Patellar Glides     Medial     Superior     Inferior          ROM     Sheet Pulls 10x5\"    Hang Weights Add NPV    Passive     Active     Weight Shift     Ankle Pumps                    CKC     Calf raises 3x10    Wall sits     Step ups     1 leg stand     Squatting     CC TKE     Balance     bridges          PRE     Extension  RANGE:   Flexion  RANGE:        Quantum machines     Leg press      Leg extension     Leg curl          Manual interventions                     Therapeutic Exercise and NMR EXR  [x] (05321) Provided verbal/tactile cueing for activities related to strengthening, flexibility, endurance, ROM for improvements in LE, proximal hip, and core control with self care, mobility, lifting, ambulation. [x] (60112) Provided verbal/tactile cueing for activities related to improving balance, coordination, kinesthetic sense, posture, motor skill, proprioception  to assist with LE, proximal hip, and core control in self care, mobility, lifting, ambulation and eccentric single leg control.      NMR and Therapeutic Activities:    [x] (29099 or 93109) Provided verbal/tactile cueing for activities related to improving balance, coordination, kinesthetic sense, posture, motor skill, proprioception and motor activation to allow for proper function of core, proximal hip and LE with self care and ADLs  [] (81062) Gait Re-education- Provided training and instruction to the patient for proper LE, core and proximal hip recruitment and positioning and eccentric body weight control with ambulation re-education including up and down stairs     Home Exercise Program:    [x] (27575) Reviewed/Progressed HEP activities related to strengthening, flexibility, endurance, ROM of core, proximal hip and LE for functional self-care, mobility, lifting and ambulation/stair navigation   [] (32823)Reviewed/Progressed HEP activities related to improving balance, coordination, kinesthetic sense, posture, motor skill, proprioception of core, proximal hip and LE for self care, mobility, lifting, and ambulation/stair navigation      Manual Treatments:  PROM / STM / Oscillations-Mobs:  G-I, II, III, IV (PA's, Inf., Post.)  [] (19481) Provided manual therapy to mobilize LE, proximal hip and/or LS spine soft tissue/joints for the purpose of modulating pain, promoting relaxation,  increasing ROM, reducing/eliminating soft tissue swelling/inflammation/restriction, improving soft tissue extensibility and allowing for proper ROM for normal function with self care, mobility, lifting and ambulation. Modalities: vaso to L knee x15'    Charges:  Timed Code Treatment Minutes: 45   Total Treatment Minutes: 76       [] EVAL (LOW) 30141 (typically 20 minutes face-to-face)  [] EVAL (MOD) 57032 (typically 30 minutes face-to-face)  [] EVAL (HIGH) 93608 (typically 45 minutes face-to-face)  [] RE-EVAL   [x] ARGUETA(81847) x  1   [] IONTO  [x] NMR (99644) x  1   [x] VASO  [] Manual (32096) x       [] Other:  [x] TA x  1    [] Mech Traction (09227)  [] ES(attended) (60752)      [] ES (un) (58421):     GOALS:   Patient stated goal: Be able to go up stairs in normalized reciprocal gait pattern; Return to [employer]    Therapist goals for Patient:   Short Term Goals: To be achieved in: 2 weeks  1. Independent in HEP and progression per patient tolerance, in order to prevent re-injury. 2. Patient will have a decrease in pain to facilitate improvement in movement, function, and ADLs as indicated by Functional Deficits. Long Term Goals: To be achieved in:6-8 weeks  1. Disability index score of 10% or less for the LEFS to assist with reaching prior level of function. 2. Patient will demonstrate increased AROM to WNL to allow for proper joint functioning as indicated by patients Functional Deficits.    3. Patient will demonstrate an increase in Strength to good proximal hip strength and control in LE to allow for proper functional mobility as indicated by patients Functional Deficits. 4. Patient will return to Independent functional activities without increased symptoms or restriction. 5. Patient will report ascending/descending flight of stairs with a normalized gait pattern. Progression Towards Functional goals:  [] Patient is progressing as expected towards functional goals listed. [] Progression is slowed due to complexities listed. [] Progression has been slowed due to co-morbidities. [x] Plan just implemented, too soon to assess goals progression  [] Other:     ASSESSMENT:  Patient demonstrates decreased swelling and improved flexion ROM of the L knee. He continues to lack 3 degrees for terminal extension. He is able to tolerate all new exercises well today, with no reports of pain in the L knee. PT educated patient that he is to avoid high levels of activity and taking it easy over the weekend to avoid pain and swelling in the L knee. He was instructed to continue icing with elevation to control swelling as well. Plan to progress activity per patient tolerance and postop protocol.     Treatment/Activity Tolerance:  [x] Patient tolerated treatment well [] Patient limited by fatique  [] Patient limited by pain  [] Patient limited by other medical complications  [] Other:     Patient education:   6/18 HEP provided and reviewed with patient    Prognosis: [x] Good [] Fair  [] Poor    Patient Requires Follow-up: [x] Yes  [] No    PLAN: See eval  [x] Continue per plan of care [] Alter current plan (see comments)  [] Plan of care initiated [] Hold pending MD visit [] Discharge    Electronically signed by: Neil Navarro DPT, GABRIELC      [place] PT license: [id number]  [place] PT license: [id number]

## 2019-06-24 ENCOUNTER — TREATMENT (OUTPATIENT)
Dept: PHYSICAL THERAPY | Age: 69
End: 2019-06-24
Payer: MEDICARE

## 2019-06-24 DIAGNOSIS — M17.12 ARTHRITIS OF LEFT KNEE: Primary | ICD-10-CM

## 2019-06-24 DIAGNOSIS — M25.462 SWELLING OF JOINT OF LEFT KNEE: ICD-10-CM

## 2019-06-24 DIAGNOSIS — M25.562 ACUTE PAIN OF LEFT KNEE: ICD-10-CM

## 2019-06-24 PROCEDURE — 97112 NEUROMUSCULAR REEDUCATION: CPT | Performed by: PHYSICAL THERAPIST

## 2019-06-24 PROCEDURE — 97530 THERAPEUTIC ACTIVITIES: CPT | Performed by: PHYSICAL THERAPIST

## 2019-06-24 PROCEDURE — 97016 VASOPNEUMATIC DEVICE THERAPY: CPT | Performed by: PHYSICAL THERAPIST

## 2019-06-24 PROCEDURE — 97110 THERAPEUTIC EXERCISES: CPT | Performed by: PHYSICAL THERAPIST

## 2019-06-24 PROCEDURE — G8427 DOCREV CUR MEDS BY ELIG CLIN: HCPCS | Performed by: PHYSICAL THERAPIST

## 2019-06-24 NOTE — FLOWSHEET NOTE
[facility]   Phone: [phone]    Fax: [phone]      Physical Therapy Daily Treatment Note  Date:  2019    Patient Name:  Vinod Sheehan    :  1950  MRN: [id number]  Restrictions/Precautions:    Medical/Treatment Diagnosis Information:  Diagnosis: K03.772 (ICD-10-CM) - Status post total left knee replacement  Treatment Diagnosis: M25.562 Pain in Left Knee  Insurance/Certification information:  PT Insurance Information: A[employer] Med Complete  Physician Information:  Referring Practitioner: Eleuterio Hidalgo MD  Plan of care signed (Y/N): yes, 19    Date of Patient follow up with Physician: 19    Functional Outcome Measure:      Date Applied:  19     LEFS: 11.25% deficit    PQRS:   Reviewed medication list with patient. No changes since last PT visit. 19    Progress Note: []  Yes  [x]  No  Next due by: Visit #10 or 19      Latex Allergy:  [x]NO      []YES  Preferred Language for Healthcare:   [x]English       []other:    Visit # Insurance Allowable   3   Med nec     Pain level:  0/10 19     SUBJECTIVE:  Patient reports his L knee feels \"good\" today. He states no pain and no issues with home exercises. He says at home he as been using an ice bag to L knee to help reduce swelling. He says he has been wearing the stocking and copper knee sleeve continuously each day.       OBJECTIVE:   Flexibility   L R Comment   Hamstring Moderate restriction     Gastroc Moderate restriction     ITB      Quad              ROM   PROM AROM Comment:  19    L R L R    Flexion   120 (heel slide) WNL    Extension   -3  0                        Strength    *Not tested due to recent surgery L R Comment   Quad Quad contraction     Hamstring      Gastroc      Hip  flexion      Hip abd                      Special Test    *Not tested due to recent surgery Results/Comment   Meniscal Click    Crepitus    Flexion Test    Valgus Laxity    Varus Laxity    Lachmans    Drop Back    Homans negative           Girth  6/24 L R   Mid Patella 43 cm 41 cm   Suprapatellar 44 cm 42 cm   5cm above 44.5 cm 43 cm   15cm above       Reflexes/Sensation:  6/18   [x]Dermatomes/Myotomes intact    []Reflexes equal and normal bilaterally   []Other:    Joint mobility: 6/18   [x]Normal    []Hypo   []Hyper    Palpation: Slight palpation over incision  6/18    Functional Mobility/Transfers: Independent with self care and ADL's with increased time required; unable to ascend/descend stairs in normalized reciprocal gait pattern; unable to walk distances greater than 1/2 mile; unable to stand longer than 1 hour  6/18     Posture: Forward head and rounded shoulders  6/18    Bandages/Dressings/Incisions: Bandage applied over incision; slight drainage present on bandage  6/18    Gait: (include devices/WB status) Slight Antalgic gait present with use of unilateral cane  6/18                        [x] Patient history, allergies, meds reviewed. Medical chart reviewed. See intake form. 6/18    Review Of Systems (ROS):  [x]Performed Review of systems (Integumentary, CardioPulmonary, Neurological) by intake and observation. Intake form has been scanned into medical record. Patient has been instructed to contact their primary care physician regarding ROS issues if not already being addressed at this time.   6/18     RESTRICTIONS/PRECAUTIONS: L TKA revision (patellar button)  6/18/19    Exercises/Interventions:   Exercise/Equipment Resistance/Repetitions Other comments   Stretching     Hamstring 5x30\"    Towel Pull 5x30\"    Inclined Calf 5x30\"    Hip Flexion     ITB     Groin     Quad                    SLR     Supine 3 x 10 1#    Abduction 3 x 10 1#    Adduction BS, 10x10\"    Prone 3x10 1#    SLR+          Isometrics     Quad sets 10x10\"         Patellar Glides     Medial     Superior     Inferior          ROM     Sheet Pulls 10x5\"    Hang Weights 5' 5#    Passive     Active Weight Shift     Ankle Pumps                    CKC     Calf raises 3x10 Up on both, down on L eccentric   Wall sits     Step ups     SL balance 20\"x3    Squatting     CC TKE WEST  20x3\"    Balance     bridges          PRE     Extension  RANGE:   Flexion  RANGE:        Quantum machines     Leg press      Leg extension     Leg curl          Manual interventions                     Therapeutic Exercise and NMR EXR  [x] (81238) Provided verbal/tactile cueing for activities related to strengthening, flexibility, endurance, ROM for improvements in LE, proximal hip, and core control with self care, mobility, lifting, ambulation. [x] (16140) Provided verbal/tactile cueing for activities related to improving balance, coordination, kinesthetic sense, posture, motor skill, proprioception  to assist with LE, proximal hip, and core control in self care, mobility, lifting, ambulation and eccentric single leg control.      NMR and Therapeutic Activities:    [x] (02902 or 66618) Provided verbal/tactile cueing for activities related to improving balance, coordination, kinesthetic sense, posture, motor skill, proprioception and motor activation to allow for proper function of core, proximal hip and LE with self care and ADLs  [] (12082) Gait Re-education- Provided training and instruction to the patient for proper LE, core and proximal hip recruitment and positioning and eccentric body weight control with ambulation re-education including up and down stairs     Home Exercise Program:    [x] (12454) Reviewed/Progressed HEP activities related to strengthening, flexibility, endurance, ROM of core, proximal hip and LE for functional self-care, mobility, lifting and ambulation/stair navigation   [] (14245)Reviewed/Progressed HEP activities related to improving balance, coordination, kinesthetic sense, posture, motor skill, proprioception of core, proximal hip and LE for self care, mobility, lifting, and ambulation/stair navigation Manual Treatments:  PROM / STM / Oscillations-Mobs:  G-I, II, III, IV (PA's, Inf., Post.)  [] (58435) Provided manual therapy to mobilize LE, proximal hip and/or LS spine soft tissue/joints for the purpose of modulating pain, promoting relaxation,  increasing ROM, reducing/eliminating soft tissue swelling/inflammation/restriction, improving soft tissue extensibility and allowing for proper ROM for normal function with self care, mobility, lifting and ambulation. Modalities: vaso to L knee x10'    Charges:  Timed Code Treatment Minutes: 47   Total Treatment Minutes: 67       [] EVAL (LOW) 75404 (typically 20 minutes face-to-face)  [] EVAL (MOD) 01158 (typically 30 minutes face-to-face)  [] EVAL (HIGH) 24249 (typically 45 minutes face-to-face)  [] RE-EVAL   [x] NL(82652) x  1   [] IONTO  [x] NMR (84200) x  1   [x] VASO  [] Manual (53958) x       [] Other:  [x] TA x  1    [] Mech Traction (60939)  [] ES(attended) (78472)      [] ES (un) (56201):     GOALS:   Patient stated goal: Be able to go up stairs in normalized reciprocal gait pattern; Return to [employer]    Therapist goals for Patient:   Short Term Goals: To be achieved in: 2 weeks  1. Independent in HEP and progression per patient tolerance, in order to prevent re-injury. 2. Patient will have a decrease in pain to facilitate improvement in movement, function, and ADLs as indicated by Functional Deficits. Long Term Goals: To be achieved in:6-8 weeks  1. Disability index score of 10% or less for the LEFS to assist with reaching prior level of function. 2. Patient will demonstrate increased AROM to WNL to allow for proper joint functioning as indicated by patients Functional Deficits. 3. Patient will demonstrate an increase in Strength to good proximal hip strength and control in LE to allow for proper functional mobility as indicated by patients Functional Deficits.    4. Patient will return to Independent functional activities without increased symptoms or restriction. 5. Patient will report ascending/descending flight of stairs with a normalized gait pattern. Progression Towards Functional goals:  [] Patient is progressing as expected towards functional goals listed. [] Progression is slowed due to complexities listed. [] Progression has been slowed due to co-morbidities. [x] Plan just implemented, too soon to assess goals progression  [] Other:     ASSESSMENT:  Patient tolerated addition of new exercises today with no increased pain in L knee. Patient was able to progress to calf raises with raising up with both feet and down with L foot eccentrically, showing good control. He continues to demonstrate slight deficit in AROM L knee extension, thus today weight hang above L knee with leg propped was added. Swelling of he L knee has only reduced . 5cm in suprapatellar region since last visit. He received VASO to L knee following exercises today. Patient was educated on continuing to wear full L stocking one more day and to not wear it during the night.        Treatment/Activity Tolerance:  [x] Patient tolerated treatment well [] Patient limited by fatique  [] Patient limited by pain  [] Patient limited by other medical complications  [] Other:     Patient education:   6/18 HEP provided and reviewed with patient    Prognosis: [x] Good [] Fair  [] Poor    Patient Requires Follow-up: [x] Yes  [] No    PLAN: See eval  [x] Continue per plan of care [] Alter current plan (see comments)  [] Plan of care initiated [] Hold pending MD visit [] Discharge    Electronically signed by: Kayden Cazares DPT, OMT-C      [place] PT license: [id number]  [place] PT license: [id number]

## 2019-06-28 ENCOUNTER — TREATMENT (OUTPATIENT)
Dept: PHYSICAL THERAPY | Age: 69
End: 2019-06-28
Payer: MEDICARE

## 2019-06-28 DIAGNOSIS — M17.12 ARTHRITIS OF LEFT KNEE: Primary | ICD-10-CM

## 2019-06-28 DIAGNOSIS — M25.562 ACUTE PAIN OF LEFT KNEE: ICD-10-CM

## 2019-06-28 DIAGNOSIS — M25.462 SWELLING OF JOINT OF LEFT KNEE: ICD-10-CM

## 2019-06-28 PROCEDURE — 97530 THERAPEUTIC ACTIVITIES: CPT | Performed by: PHYSICAL THERAPIST

## 2019-06-28 PROCEDURE — 97016 VASOPNEUMATIC DEVICE THERAPY: CPT | Performed by: PHYSICAL THERAPIST

## 2019-06-28 PROCEDURE — 97110 THERAPEUTIC EXERCISES: CPT | Performed by: PHYSICAL THERAPIST

## 2019-06-28 PROCEDURE — 97112 NEUROMUSCULAR REEDUCATION: CPT | Performed by: PHYSICAL THERAPIST

## 2019-06-28 PROCEDURE — G8427 DOCREV CUR MEDS BY ELIG CLIN: HCPCS | Performed by: PHYSICAL THERAPIST

## 2019-06-28 NOTE — FLOWSHEET NOTE
[facility]   Phone: [phone]    Fax: [phone]      Physical Therapy Daily Treatment Note  Date:  2019    Patient Name:  Tj Ryan    :  1950  MRN: [id number]  Restrictions/Precautions:    Medical/Treatment Diagnosis Information:  Diagnosis: A44.257 (ICD-10-CM) - Status post total left knee replacement  Treatment Diagnosis: M25.562 Pain in Left Knee  Insurance/Certification information:  PT Insurance Information: AARP Med Complete  Physician Information:  Referring Practitioner: Radha Salgado MD  Plan of care signed (Y/N): yes, 19    Date of Patient follow up with Physician: 19    Functional Outcome Measure:      Date Applied:  19     LEFS: 11.25% deficit    PQRS:   Reviewed medication list with patient. No changes since last PT visit. 19    Progress Note: []  Yes  [x]  No  Next due by: Visit #10 or 19      Latex Allergy:  [x]NO      []YES  Preferred Language for Healthcare:   [x]English       []other:    Visit # Insurance Allowable   4   Med nec     Pain level:  0/10 19     SUBJECTIVE:  Patient reports he has not attempted to walk up steps with one foot on each step. He states his L knee continues to improve and has not had any issues with home exercises. He feels that swelling has significantly improved in his L knee.       OBJECTIVE:   Flexibility   L R Comment   Hamstring Moderate restriction     Gastroc Moderate restriction     ITB      Quad              ROM   PROM AROM Comment:  19    L R L R    Flexion   132 (heel slide) WNL    Extension   -1°  0                        Strength    *Not tested due to recent surgery L R Comment   Quad Quad contraction     Hamstring      Gastroc      Hip  flexion      Hip abd                      Special Test    *Not tested due to recent surgery Results/Comment   Meniscal Click    Crepitus    Flexion Test    Valgus Laxity    Varus Laxity Lachmans    Drop Back    Homans negative           Girth  6/24 L R   Mid Patella 43 cm 41 cm   Suprapatellar 44 cm 42 cm   5cm above 44.5 cm 43 cm   15cm above       Reflexes/Sensation:  6/18    [x]Dermatomes/Myotomes intact    []Reflexes equal and normal bilaterally   []Other:    Joint mobility: 6/18   [x]Normal    []Hypo   []Hyper    Palpation: Slight palpation over incision  6/18    Functional Mobility/Transfers: Independent with self care and ADL's with increased time required; unable to ascend/descend stairs in normalized reciprocal gait pattern; unable to walk distances greater than 1/2 mile; unable to stand longer than 1 hour  6/18     Posture: Forward head and rounded shoulders  6/18    Bandages/Dressings/Incisions: Bandage applied over incision; slight drainage present on bandage  6/18    Gait: (include devices/WB status) Slight Antalgic gait present with use of unilateral cane  6/18                        [x] Patient history, allergies, meds reviewed. Medical chart reviewed. See intake form. 6/18    Review Of Systems (ROS):  [x]Performed Review of systems (Integumentary, CardioPulmonary, Neurological) by intake and observation. Intake form has been scanned into medical record. Patient has been instructed to contact their primary care physician regarding ROS issues if not already being addressed at this time.   6/18     RESTRICTIONS/PRECAUTIONS: L TKA revision (patellar button)  6/18/19    Exercises/Interventions:   Exercise/Equipment Resistance/Repetitions Other comments   Stretching     Hamstring 5x30\"    Towel Pull 5x30\"    Inclined Calf 5x30\"    Hip Flexion     ITB     Groin     Quad                    SLR     Supine 3 x 10 1#    Abduction 3 x 10 1#    Adduction BS, 10x10\"    Prone 3x10 1#    SLR+          Isometrics     Quad sets 10x10\"         Patellar Glides     Medial     Superior     Inferior          ROM     Sheet Pulls 10x5\"       Passive     Active     Weight Shift     Ankle Pumps CKC     Calf raises 3x10 Up on both, down on L eccentric   Wall sits     Step ups 30x lvl 2    SL balance 1'x3    Squatting     CC TKE WEST  30x3\"    Balance     bridges          PRE     Extension  RANGE:   Flexion  RANGE:        Quantum machines     Leg press      Leg extension     Leg curl          Manual interventions                     Therapeutic Exercise and NMR EXR  [x] (60145) Provided verbal/tactile cueing for activities related to strengthening, flexibility, endurance, ROM for improvements in LE, proximal hip, and core control with self care, mobility, lifting, ambulation. [x] (52981) Provided verbal/tactile cueing for activities related to improving balance, coordination, kinesthetic sense, posture, motor skill, proprioception  to assist with LE, proximal hip, and core control in self care, mobility, lifting, ambulation and eccentric single leg control.      NMR and Therapeutic Activities:    [x] (81615 or 38431) Provided verbal/tactile cueing for activities related to improving balance, coordination, kinesthetic sense, posture, motor skill, proprioception and motor activation to allow for proper function of core, proximal hip and LE with self care and ADLs  [] (46055) Gait Re-education- Provided training and instruction to the patient for proper LE, core and proximal hip recruitment and positioning and eccentric body weight control with ambulation re-education including up and down stairs     Home Exercise Program:    [x] (09041) Reviewed/Progressed HEP activities related to strengthening, flexibility, endurance, ROM of core, proximal hip and LE for functional self-care, mobility, lifting and ambulation/stair navigation   [] (52299)Reviewed/Progressed HEP activities related to improving balance, coordination, kinesthetic sense, posture, motor skill, proprioception of core, proximal hip and LE for self care, mobility, lifting, and ambulation/stair navigation      Manual Treatments:  PROM / STM / Oscillations-Mobs:  G-I, II, III, IV (PA's, Inf., Post.)  [] (73528) Provided manual therapy to mobilize LE, proximal hip and/or LS spine soft tissue/joints for the purpose of modulating pain, promoting relaxation,  increasing ROM, reducing/eliminating soft tissue swelling/inflammation/restriction, improving soft tissue extensibility and allowing for proper ROM for normal function with self care, mobility, lifting and ambulation. Modalities: vaso to L knee x10'    Charges:  Timed Code Treatment Minutes: 60   Total Treatment Minutes: 80       [] EVAL (LOW) 26696 (typically 20 minutes face-to-face)  [] EVAL (MOD) 84778 (typically 30 minutes face-to-face)  [] EVAL (HIGH) 49574 (typically 45 minutes face-to-face)  [] RE-EVAL   [x] GM(27441) x  2   [] IONTO  [x] NMR (57871) x  1   [x] VASO  [] Manual (07744) x       [] Other:  [x] TA x  1    [] Mech Traction (04891)  [] ES(attended) (76169)      [] ES (un) (52472):     GOALS:   Patient stated goal: Be able to go up stairs in normalized reciprocal gait pattern; Return to [employer]    Therapist goals for Patient:   Short Term Goals: To be achieved in: 2 weeks  1. Independent in HEP and progression per patient tolerance, in order to prevent re-injury. 2. Patient will have a decrease in pain to facilitate improvement in movement, function, and ADLs as indicated by Functional Deficits. Long Term Goals: To be achieved in:6-8 weeks  1. Disability index score of 10% or less for the LEFS to assist with reaching prior level of function. 2. Patient will demonstrate increased AROM to WNL to allow for proper joint functioning as indicated by patients Functional Deficits. 3. Patient will demonstrate an increase in Strength to good proximal hip strength and control in LE to allow for proper functional mobility as indicated by patients Functional Deficits. 4. Patient will return to Independent functional activities without increased symptoms or restriction.    5. Patient will report ascending/descending flight of stairs with a normalized gait pattern. Progression Towards Functional goals:  [] Patient is progressing as expected towards functional goals listed. [] Progression is slowed due to complexities listed. [] Progression has been slowed due to co-morbidities. [x] Plan just implemented, too soon to assess goals progression  [] Other:     ASSESSMENT:  Today patient showed an increase of L knee flexion to 132° during heel slides and an increase in L knee extension at -1° following quad sets. He was able to perform step ups today leading with his L foot and with no pain in L knee. He was able to perform SL balance for increased time today. During SL balance he showed a few episodes of LOB and use of UE for support, however displayed improvement from last visit. He received VASO to L knee at the end of today's session. Patient was advised to continue wearing his knee sleeve considering there is still minimal swelling in his L knee.       Treatment/Activity Tolerance:  [x] Patient tolerated treatment well [] Patient limited by fatique  [] Patient limited by pain  [] Patient limited by other medical complications  [] Other:     Patient education:   6/18 HEP provided and reviewed with patient    Prognosis: [x] Good [] Fair  [] Poor    Patient Requires Follow-up: [x] Yes  [] No    PLAN: See eval  [x] Continue per plan of care [] Alter current plan (see comments)  [] Plan of care initiated [] Hold pending MD visit [] Discharge    Electronically signed by: Hakeem Crocker DPT, LUC      [facility] PT license: [id number]  [place] PT license: [id number]

## 2019-07-02 ENCOUNTER — TREATMENT (OUTPATIENT)
Dept: PHYSICAL THERAPY | Age: 69
End: 2019-07-02
Payer: MEDICARE

## 2019-07-02 DIAGNOSIS — M17.12 ARTHRITIS OF LEFT KNEE: Primary | ICD-10-CM

## 2019-07-02 DIAGNOSIS — M25.562 ACUTE PAIN OF LEFT KNEE: ICD-10-CM

## 2019-07-02 DIAGNOSIS — M25.462 SWELLING OF JOINT OF LEFT KNEE: ICD-10-CM

## 2019-07-02 PROCEDURE — 97530 THERAPEUTIC ACTIVITIES: CPT | Performed by: PHYSICAL THERAPIST

## 2019-07-02 PROCEDURE — 97016 VASOPNEUMATIC DEVICE THERAPY: CPT | Performed by: PHYSICAL THERAPIST

## 2019-07-02 PROCEDURE — G8427 DOCREV CUR MEDS BY ELIG CLIN: HCPCS | Performed by: PHYSICAL THERAPIST

## 2019-07-02 PROCEDURE — 97112 NEUROMUSCULAR REEDUCATION: CPT | Performed by: PHYSICAL THERAPIST

## 2019-07-02 PROCEDURE — 97110 THERAPEUTIC EXERCISES: CPT | Performed by: PHYSICAL THERAPIST

## 2019-07-02 NOTE — FLOWSHEET NOTE
Varus Laxity    Lachmans    Drop Back    Homans negative           Girth  7/2/19 L R   Mid Patella 41.5 cm 41 cm   Suprapatellar 42 cm 42 cm   5cm above 43 cm 43 cm   15cm above       Reflexes/Sensation:  6/18    [x]Dermatomes/Myotomes intact    []Reflexes equal and normal bilaterally   []Other:    Joint mobility: 6/18   [x]Normal    []Hypo   []Hyper    Palpation: Slight palpation over incision  6/18    Functional Mobility/Transfers: Independent with self care and ADL's with increased time required; unable to ascend/descend stairs in normalized reciprocal gait pattern; unable to walk distances greater than 1/2 mile; unable to stand longer than 1 hour  6/18     Posture: Forward head and rounded shoulders  6/18    Bandages/Dressings/Incisions: Bandage applied over incision; slight drainage present on bandage  6/18    Gait: (include devices/WB status) Slight Antalgic gait present with use of unilateral cane  6/18                        [x] Patient history, allergies, meds reviewed. Medical chart reviewed. See intake form. 6/18    Review Of Systems (ROS):  [x]Performed Review of systems (Integumentary, CardioPulmonary, Neurological) by intake and observation. Intake form has been scanned into medical record. Patient has been instructed to contact their primary care physician regarding ROS issues if not already being addressed at this time.   6/18     RESTRICTIONS/PRECAUTIONS: L TKA revision (patellar button)  6/18/19    Exercises/Interventions:   Exercise/Equipment Resistance/Repetitions Other comments   Stretching     Hamstring 5x30\"    Towel Pull 5x30\"    Inclined Calf 5x30\"    Hip Flexion     ITB     Groin     Quad                    SLR     Supine 3 x 10 2#    Abduction 3 x 10 2#    Adduction BS, 10x10\"    extension 3x10 2# In standing   SLR+          Isometrics     Quad sets 10x10\"         Patellar Glides     Medial     Superior     Inferior          ROM     Sheet Pulls 10x5\"       Passive     Active     Weight activities without increased symptoms or restriction. 5. Patient will report ascending/descending flight of stairs with a normalized gait pattern. Progression Towards Functional goals:  [] Patient is progressing as expected towards functional goals listed. [] Progression is slowed due to complexities listed. [] Progression has been slowed due to co-morbidities. [x] Plan just implemented, too soon to assess goals progression  [] Other:     ASSESSMENT:  Patient demonstrates continued decreased swelling of the L knee at all points today, see measurements above. He demonstrates a 10 degree increase in flexion of the L knee following heel slides (118° prior to, 128° post). He was able to tolerate increased height for step ups with no reports of increased pain in the L knee, but required moderate VCs for proper form with stepping back with R foot to better challenge eccentric L quad function.     Treatment/Activity Tolerance:  [x] Patient tolerated treatment well [] Patient limited by fatique  [] Patient limited by pain  [] Patient limited by other medical complications  [] Other:     Patient education:   6/18 HEP provided and reviewed with patient    Prognosis: [x] Good [] Fair  [] Poor    Patient Requires Follow-up: [x] Yes  [] No    PLAN: See eval  [x] Continue per plan of care [] Alter current plan (see comments)  [] Plan of care initiated [] Hold pending MD visit [] Discharge    Electronically signed by: Juan Diego Jernigan DPT, BRANDAN-C      [place] PT license: [id number]  [place] PT license: [id number]

## 2019-07-05 ENCOUNTER — TREATMENT (OUTPATIENT)
Dept: PHYSICAL THERAPY | Age: 69
End: 2019-07-05
Payer: MEDICARE

## 2019-07-05 DIAGNOSIS — M17.12 ARTHRITIS OF LEFT KNEE: Primary | ICD-10-CM

## 2019-07-05 DIAGNOSIS — M25.462 SWELLING OF JOINT OF LEFT KNEE: ICD-10-CM

## 2019-07-05 DIAGNOSIS — M25.562 ACUTE PAIN OF LEFT KNEE: ICD-10-CM

## 2019-07-05 PROCEDURE — 97530 THERAPEUTIC ACTIVITIES: CPT | Performed by: PHYSICAL THERAPIST

## 2019-07-05 PROCEDURE — G8427 DOCREV CUR MEDS BY ELIG CLIN: HCPCS | Performed by: PHYSICAL THERAPIST

## 2019-07-05 PROCEDURE — 97016 VASOPNEUMATIC DEVICE THERAPY: CPT | Performed by: PHYSICAL THERAPIST

## 2019-07-05 PROCEDURE — 97112 NEUROMUSCULAR REEDUCATION: CPT | Performed by: PHYSICAL THERAPIST

## 2019-07-05 PROCEDURE — 97110 THERAPEUTIC EXERCISES: CPT | Performed by: PHYSICAL THERAPIST

## 2019-07-05 NOTE — FLOWSHEET NOTE
[facility]   Phone: [phone]    Fax: [phone]      Physical Therapy Daily Treatment Note  Date:  2019    Patient Name:  Nikki Todd    :  1950  MRN: [id number]  Restrictions/Precautions:    Medical/Treatment Diagnosis Information:  Diagnosis: P86.846 (ICD-10-CM) - Status post total left knee replacement  Treatment Diagnosis: M25.562 Pain in Left Knee  Insurance/Certification information:  PT Insurance Information: [employer] Med Complete  Physician Information:  Referring Practitioner: Paula Starkey MD  Plan of care signed (Y/N): yes, 19    Date of Patient follow up with Physician: 19    Functional Outcome Measure:      Date Applied:  19     LEFS: 11.25% deficit    PQRS:   Reviewed medication list with patient. No changes since last PT visit. 19    Progress Note: []  Yes  [x]  No  Next due by: Visit #10 or 19      Latex Allergy:  [x]NO      []YES  Preferred Language for Healthcare:   [x]English       []other:    Visit # Insurance Allowable   6  Med nec     Pain level:  0/10 19     SUBJECTIVE:  Patient reports that L knee is doing well, with no pain today. He notes that he attempted to go up 1 to 2 steps reciprocally with minimal discomfort. He notes that he still can feel that the leg is weak.         >4 weeks postop      OBJECTIVE:   Flexibility   L R Comment   Hamstring Moderate restriction     Gastroc Moderate restriction     ITB      Quad              ROM   PROM AROM Comment:  19    L R L R    Flexion   132 (heel slide) WNL    Extension   -1°  0                        Strength    *Not tested due to recent surgery L R Comment   Quad Quad contraction: good     Hamstring      Gastroc      Hip  flexion      Hip abd                      Special Test    *Not tested due to recent surgery Results/Comment   Meniscal Click    Crepitus    Flexion Test    Valgus Laxity    Varus Laxity    Lachmans    Drop

## 2019-07-10 ENCOUNTER — TREATMENT (OUTPATIENT)
Dept: PHYSICAL THERAPY | Age: 69
End: 2019-07-10
Payer: MEDICARE

## 2019-07-10 DIAGNOSIS — M25.462 SWELLING OF JOINT OF LEFT KNEE: ICD-10-CM

## 2019-07-10 DIAGNOSIS — M25.562 ACUTE PAIN OF LEFT KNEE: ICD-10-CM

## 2019-07-10 DIAGNOSIS — M17.12 ARTHRITIS OF LEFT KNEE: Primary | ICD-10-CM

## 2019-07-10 PROCEDURE — 97112 NEUROMUSCULAR REEDUCATION: CPT | Performed by: PHYSICAL THERAPIST

## 2019-07-10 PROCEDURE — 97530 THERAPEUTIC ACTIVITIES: CPT | Performed by: PHYSICAL THERAPIST

## 2019-07-10 PROCEDURE — 97016 VASOPNEUMATIC DEVICE THERAPY: CPT | Performed by: PHYSICAL THERAPIST

## 2019-07-10 PROCEDURE — 97110 THERAPEUTIC EXERCISES: CPT | Performed by: PHYSICAL THERAPIST

## 2019-07-10 NOTE — FLOWSHEET NOTE
Pulls 10x5\"       Passive     Active     Weight Shift     Ankle Pumps                    CKC     Calf raises 3x10 Up on both, down on L eccentric   Wall sits 3x30\"     Step ups  lvl 3 3x10    Step up and over lvl 1  2x10    SL balance 3x30\" Ea ft   Squatting     CC TKE WEST  30x3\"    Balance     bridges          PRE     Extension  RANGE:   Flexion  RANGE:        Quantum machines     Leg press   110#  20x  DL w/ eccentric lowering on L    Leg extension     Leg curl          Manual interventions                     Therapeutic Exercise and NMR EXR  [x] (35121) Provided verbal/tactile cueing for activities related to strengthening, flexibility, endurance, ROM for improvements in LE, proximal hip, and core control with self care, mobility, lifting, ambulation. [x] (59665) Provided verbal/tactile cueing for activities related to improving balance, coordination, kinesthetic sense, posture, motor skill, proprioception  to assist with LE, proximal hip, and core control in self care, mobility, lifting, ambulation and eccentric single leg control.      NMR and Therapeutic Activities:    [x] (10437 or 46202) Provided verbal/tactile cueing for activities related to improving balance, coordination, kinesthetic sense, posture, motor skill, proprioception and motor activation to allow for proper function of core, proximal hip and LE with self care and ADLs  [] (72744) Gait Re-education- Provided training and instruction to the patient for proper LE, core and proximal hip recruitment and positioning and eccentric body weight control with ambulation re-education including up and down stairs     Home Exercise Program:    [x] (99851) Reviewed/Progressed HEP activities related to strengthening, flexibility, endurance, ROM of core, proximal hip and LE for functional self-care, mobility, lifting and ambulation/stair navigation   [] (71701)Reviewed/Progressed HEP activities related to improving balance, coordination, kinesthetic sense, functional mobility as indicated by patients Functional Deficits. 4. Patient will return to Independent functional activities without increased symptoms or restriction. 5. Patient will report ascending/descending flight of stairs with a normalized gait pattern. Progression Towards Functional goals:  [] Patient is progressing as expected towards functional goals listed. [] Progression is slowed due to complexities listed. [] Progression has been slowed due to co-morbidities. [x] Plan just implemented, too soon to assess goals progression  [] Other:     ASSESSMENT:  Patient tolerated wall sit exercise today with knees flexed to ~ 60°. He stated he gets L knee pain when he bends too low. He was unable to perform step up/over exercise with lvl 2 steps due to inability to control body weight eccentrically. PT adjusted step height to lvl 1 and he was able to perform step up/over exercise with adequate form and no L knee pain. He tolerated addition of 3# during SLR exercises without any complications. He received VASO to R knee following today's exercises. PT advised patient to schedule one session per week for at least a few more weeks to continue to work on R quad strengthening in order to improve ability to ascend/descend his steps at home.        Treatment/Activity Tolerance:  [x] Patient tolerated treatment well [] Patient limited by fatique  [] Patient limited by pain  [] Patient limited by other medical complications  [] Other:     Patient education:   6/18 HEP provided and reviewed with patient    Prognosis: [x] Good [] Fair  [] Poor    Patient Requires Follow-up: [x] Yes  [] No    PLAN: See eval  [x] Continue per plan of care [] Alter current plan (see comments)  [] Plan of care initiated [] Hold pending MD visit [] Discharge    Electronically signed by: Myrna Pelayo DPT, GABRIELC      [place] PT license: [id number]  [place] PT license: [id number]

## 2019-07-12 ENCOUNTER — TREATMENT (OUTPATIENT)
Dept: PHYSICAL THERAPY | Age: 69
End: 2019-07-12
Payer: MEDICARE

## 2019-07-12 DIAGNOSIS — M25.462 SWELLING OF JOINT OF LEFT KNEE: ICD-10-CM

## 2019-07-12 DIAGNOSIS — M17.12 ARTHRITIS OF LEFT KNEE: Primary | ICD-10-CM

## 2019-07-12 DIAGNOSIS — M25.562 ACUTE PAIN OF LEFT KNEE: ICD-10-CM

## 2019-07-12 PROCEDURE — 97016 VASOPNEUMATIC DEVICE THERAPY: CPT | Performed by: PHYSICAL THERAPIST

## 2019-07-12 PROCEDURE — 97112 NEUROMUSCULAR REEDUCATION: CPT | Performed by: PHYSICAL THERAPIST

## 2019-07-12 PROCEDURE — G8427 DOCREV CUR MEDS BY ELIG CLIN: HCPCS | Performed by: PHYSICAL THERAPIST

## 2019-07-12 PROCEDURE — 97530 THERAPEUTIC ACTIVITIES: CPT | Performed by: PHYSICAL THERAPIST

## 2019-07-12 PROCEDURE — 97110 THERAPEUTIC EXERCISES: CPT | Performed by: PHYSICAL THERAPIST

## 2019-07-12 NOTE — FLOWSHEET NOTE
proprioception of core, proximal hip and LE for self care, mobility, lifting, and ambulation/stair navigation      Manual Treatments:  PROM / STM / Oscillations-Mobs:  G-I, II, III, IV (PA's, Inf., Post.)  [] (50136) Provided manual therapy to mobilize LE, proximal hip and/or LS spine soft tissue/joints for the purpose of modulating pain, promoting relaxation,  increasing ROM, reducing/eliminating soft tissue swelling/inflammation/restriction, improving soft tissue extensibility and allowing for proper ROM for normal function with self care, mobility, lifting and ambulation. Modalities: vaso to L knee x10'    Charges:  Timed Code Treatment Minutes: 60   Total Treatment Minutes: 75       [] EVAL (LOW) 95448 (typically 20 minutes face-to-face)  [] EVAL (MOD) 02731 (typically 30 minutes face-to-face)  [] EVAL (HIGH) 49843 (typically 45 minutes face-to-face)  [] RE-EVAL   [x] XT(87133) x  2   [] IONTO  [x] NMR (02559) x  1   [x] VASO  [] Manual (77120) x       [] Other:  [x] TA x  1    [] Mech Traction (34618)  [] ES(attended) (95565)      [] ES (un) (67495):     GOALS:   Patient stated goal: Be able to go up stairs in normalized reciprocal gait pattern; Return to [employer] and Cognitive Match    Therapist goals for Patient:   Short Term Goals: To be achieved in: 2 weeks  1. Independent in HEP and progression per patient tolerance, in order to prevent re-injury. 2. Patient will have a decrease in pain to facilitate improvement in movement, function, and ADLs as indicated by Functional Deficits. Long Term Goals: To be achieved in:6-8 weeks  1. Disability index score of 10% or less for the LEFS to assist with reaching prior level of function. 2. Patient will demonstrate increased AROM to WNL to allow for proper joint functioning as indicated by patients Functional Deficits.    3. Patient will demonstrate an increase in Strength to good proximal hip strength and control in LE to allow for proper functional mobility as

## 2019-07-18 ENCOUNTER — TREATMENT (OUTPATIENT)
Dept: PHYSICAL THERAPY | Age: 69
End: 2019-07-18
Payer: MEDICARE

## 2019-07-18 DIAGNOSIS — M25.462 SWELLING OF JOINT OF LEFT KNEE: ICD-10-CM

## 2019-07-18 DIAGNOSIS — M17.12 ARTHRITIS OF LEFT KNEE: Primary | ICD-10-CM

## 2019-07-18 DIAGNOSIS — M25.562 ACUTE PAIN OF LEFT KNEE: ICD-10-CM

## 2019-07-18 PROCEDURE — 97110 THERAPEUTIC EXERCISES: CPT | Performed by: PHYSICAL THERAPIST

## 2019-07-18 PROCEDURE — 97530 THERAPEUTIC ACTIVITIES: CPT | Performed by: PHYSICAL THERAPIST

## 2019-07-18 PROCEDURE — G8427 DOCREV CUR MEDS BY ELIG CLIN: HCPCS | Performed by: PHYSICAL THERAPIST

## 2019-07-18 PROCEDURE — 97112 NEUROMUSCULAR REEDUCATION: CPT | Performed by: PHYSICAL THERAPIST

## 2019-07-26 ENCOUNTER — OFFICE VISIT (OUTPATIENT)
Dept: ORTHOPEDIC SURGERY | Age: 69
End: 2019-07-26

## 2019-07-26 VITALS
SYSTOLIC BLOOD PRESSURE: 130 MMHG | DIASTOLIC BLOOD PRESSURE: 72 MMHG | WEIGHT: 226 LBS | HEIGHT: 73 IN | BODY MASS INDEX: 29.95 KG/M2

## 2019-07-26 DIAGNOSIS — T84.018A MECHANICAL FAILURE OF PROSTHETIC KNEE JOINT (HCC): ICD-10-CM

## 2019-07-26 DIAGNOSIS — Z96.652 STATUS POST TOTAL LEFT KNEE REPLACEMENT: Primary | ICD-10-CM

## 2019-07-26 DIAGNOSIS — Z96.659 MECHANICAL FAILURE OF PROSTHETIC KNEE JOINT (HCC): ICD-10-CM

## 2019-07-26 PROCEDURE — 99024 POSTOP FOLLOW-UP VISIT: CPT | Performed by: ORTHOPAEDIC SURGERY

## 2019-07-26 NOTE — PROGRESS NOTES
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 He is doing very well now. He said almost from the day after surgery he has had much less pain in his knee. He is walking normally with no limp. He is working out on his own at this point. ROS: Pertinent items are noted in HPI. No notes on file    Past Medical History:  No date: AAA (abdominal aortic aneurysm) (HCC)  No date: GERD (gastroesophageal reflux disease)      Comment:  mild intermittent  No date: Hypertension     Past Surgical History:  No date: COLONOSCOPY  No date: JOINT REPLACEMENT      Comment:  left knee  No date: KNEE ARTHROSCOPY      Comment:  one on right, two on left  6/4/2019: REVISION TOTAL KNEE ARTHROPLASTY; Left      Comment:  REVISION LEFT PATELLAR BUTTON performed by Inga Mooney MD at [address] [place] [address] Drive: SINUS SURGERY    Review of patient's family history indicates:  Problem: Rheum Arthritis      Relation: Mother          Age of Onset: (Not Specified)  Problem: High Blood Pressure      Relation: Father          Age of Onset: (Not Specified)  Problem: Cancer      Relation: Father          Age of Onset: (Not Specified)          Comment: throat      Social History    Socioeconomic History      Marital status: Single      Spouse name: None      Number of children: None      Years of education: None      Highest education level: None    Occupational History      None    Social Needs      Financial resource strain: None      Food insecurity:        Worry: None        Inability: None      Transportation needs:        Medical: None        Non-medical: None    Tobacco Use      Smoking status: Never Smoker      Smokeless tobacco: Never Used    Substance and Sexual Activity      Alcohol use:  Yes        Alcohol/week: 7.0 standard drinks        Types: 7 Glasses of wine per week        Comment: 1-2 glasses wine daily      Drug use: No      Sexual

## 2019-08-06 ENCOUNTER — TELEPHONE (OUTPATIENT)
Dept: ORTHOPEDIC SURGERY | Age: 69
End: 2019-08-06

## 2019-09-18 ENCOUNTER — OFFICE VISIT (OUTPATIENT)
Dept: ORTHOPEDIC SURGERY | Age: 69
End: 2019-09-18
Payer: MEDICARE

## 2019-09-18 VITALS
WEIGHT: 226 LBS | HEIGHT: 73 IN | DIASTOLIC BLOOD PRESSURE: 84 MMHG | BODY MASS INDEX: 29.95 KG/M2 | HEART RATE: 60 BPM | SYSTOLIC BLOOD PRESSURE: 126 MMHG

## 2019-09-18 DIAGNOSIS — Z96.652 STATUS POST TOTAL LEFT KNEE REPLACEMENT: Primary | ICD-10-CM

## 2019-09-18 PROCEDURE — 1036F TOBACCO NON-USER: CPT | Performed by: ORTHOPAEDIC SURGERY

## 2019-09-18 PROCEDURE — G8419 CALC BMI OUT NRM PARAM NOF/U: HCPCS | Performed by: ORTHOPAEDIC SURGERY

## 2019-09-18 PROCEDURE — G8427 DOCREV CUR MEDS BY ELIG CLIN: HCPCS | Performed by: ORTHOPAEDIC SURGERY

## 2019-09-18 PROCEDURE — 99212 OFFICE O/P EST SF 10 MIN: CPT | Performed by: ORTHOPAEDIC SURGERY

## 2019-09-18 PROCEDURE — 4040F PNEUMOC VAC/ADMIN/RCVD: CPT | Performed by: ORTHOPAEDIC SURGERY

## 2019-09-18 PROCEDURE — 1123F ACP DISCUSS/DSCN MKR DOCD: CPT | Performed by: ORTHOPAEDIC SURGERY

## 2019-09-18 PROCEDURE — 3017F COLORECTAL CA SCREEN DOC REV: CPT | Performed by: ORTHOPAEDIC SURGERY

## 2019-09-18 NOTE — PROGRESS NOTES
Patient returns today he is about 3 months status post patellar button revision for lateral tracking. He is doing well. He is active and in the outdoors and not having any troubles with his knee as far as catching locking pain or swelling. ROS: Pertinent items are noted in HPI. No notes on file    Past Medical History:  No date: AAA (abdominal aortic aneurysm) (HCC)  No date: GERD (gastroesophageal reflux disease)      Comment:  mild intermittent  No date: Hypertension     Past Surgical History:  No date: COLONOSCOPY  No date: JOINT REPLACEMENT      Comment:  left knee  No date: KNEE ARTHROSCOPY      Comment:  one on right, two on left  6/4/2019: REVISION TOTAL KNEE ARTHROPLASTY; Left      Comment:  REVISION LEFT PATELLAR BUTTON performed by Mandeep Hdz MD at [address] [place] [address] Drive: SINUS SURGERY    Review of patient's family history indicates:  Problem: Rheum Arthritis      Relation: Mother          Age of Onset: (Not Specified)  Problem: High Blood Pressure      Relation: Father          Age of Onset: (Not Specified)  Problem: Cancer      Relation: Father          Age of Onset: (Not Specified)          Comment: throat      Social History    Socioeconomic History      Marital status: Single      Spouse name: None      Number of children: None      Years of education: None      Highest education level: None    Occupational History      None    Social Needs      Financial resource strain: None      Food insecurity:        Worry: None        Inability: None      Transportation needs:        Medical: None        Non-medical: None    Tobacco Use      Smoking status: Never Smoker      Smokeless tobacco: Never Used    Substance and Sexual Activity      Alcohol use:  Yes        Alcohol/week: 7.0 standard drinks        Types: 7 Glasses of wine per week        Comment: 1-2 glasses wine daily      Drug use: No      Sexual activity: None    Lifestyle      Physical activity:        Days per week: None        Minutes per session: None      Stress: None    Relationships      Social connections:        Talks on phone: None        Gets together: None        Attends Uatsdin service: None        Active member of club or organization: None        Attends meetings of clubs or organizations: None        Relationship status: None      Intimate partner violence:        Fear of current or ex partner: None        Emotionally abused: None        Physically abused: None        Forced sexual activity: None    Other Topics      Concerns:        None    Social History Narrative      None      Current Outpatient Medications:  diclofenac (VOLTAREN) 75 MG EC tablet, TAKE 1 TABLET BY MOUTH TWICE DAILY, Disp: 60 tablet, Rfl: 2  aspirin EC 81 MG EC tablet, Take 1 tablet by mouth 2 times daily (with meals), Disp: 60 tablet, Rfl: 0  Multiple Vitamins-Minerals (THERAPEUTIC MULTIVITAMIN-MINERALS) tablet, Take 1 tablet by mouth daily, Disp: , Rfl:   metoprolol succinate (TOPROL XL) 50 MG extended release tablet, Take 50 mg by mouth nightly , Disp: , Rfl:   losartan-hydrochlorothiazide (HYZAAR) 100-25 MG per tablet, Take 1 tablet by mouth daily , Disp: , Rfl:   omeprazole (PRILOSEC) 40 MG delayed release capsule, Take 40 mg by mouth daily , Disp: , Rfl:   doxazosin (CARDURA) 2 MG tablet, Take 2 mg by mouth nightly , Disp: , Rfl:   citalopram (CELEXA) 20 MG tablet, Take 20 mg by mouth nightly , Disp: , Rfl:   aMILoride (MIDAMOR) 5 MG tablet, Take 5 mg by mouth 2 times daily , Disp: , Rfl:   ascorbic acid (VITAMIN C) 1000 MG tablet, Take 1,000 mg by mouth daily , Disp: , Rfl:     No current facility-administered medications for this visit. -- Ciprofloxacin     --  anxiety   -- Doxycycline     --  anxiety   -- Penicillins -- Rash    VITAL SIGNS:  /84   Pulse 60   Ht 6' 1\" (1.854 m)   Wt 226 lb (102.5 kg)   BMI 29.82 kg/m²   On examination today he has 0 to just past 125 degrees of knee flexion.   He has a little crepitus

## 2020-06-01 ENCOUNTER — OFFICE VISIT (OUTPATIENT)
Dept: ORTHOPEDIC SURGERY | Age: 70
End: 2020-06-01
Payer: MEDICARE

## 2020-06-01 ENCOUNTER — TELEPHONE (OUTPATIENT)
Dept: ORTHOPEDIC SURGERY | Age: 70
End: 2020-06-01

## 2020-06-01 VITALS — HEIGHT: 73 IN | BODY MASS INDEX: 29.82 KG/M2 | TEMPERATURE: 97.8 F | WEIGHT: 225 LBS

## 2020-06-01 PROCEDURE — 1036F TOBACCO NON-USER: CPT | Performed by: ORTHOPAEDIC SURGERY

## 2020-06-01 PROCEDURE — G8417 CALC BMI ABV UP PARAM F/U: HCPCS | Performed by: ORTHOPAEDIC SURGERY

## 2020-06-01 PROCEDURE — 4040F PNEUMOC VAC/ADMIN/RCVD: CPT | Performed by: ORTHOPAEDIC SURGERY

## 2020-06-01 PROCEDURE — 3017F COLORECTAL CA SCREEN DOC REV: CPT | Performed by: ORTHOPAEDIC SURGERY

## 2020-06-01 PROCEDURE — 1123F ACP DISCUSS/DSCN MKR DOCD: CPT | Performed by: ORTHOPAEDIC SURGERY

## 2020-06-01 PROCEDURE — 99213 OFFICE O/P EST LOW 20 MIN: CPT | Performed by: ORTHOPAEDIC SURGERY

## 2020-06-01 PROCEDURE — G8427 DOCREV CUR MEDS BY ELIG CLIN: HCPCS | Performed by: ORTHOPAEDIC SURGERY

## 2020-06-01 NOTE — PROGRESS NOTES
week        Comment: 1-2 glasses wine daily      Drug use: No      Sexual activity: None    Lifestyle      Physical activity        Days per week: None        Minutes per session: None      Stress: None    Relationships      Social connections        Talks on phone: None        Gets together: None        Attends Caodaism service: None        Active member of club or organization: None        Attends meetings of clubs or organizations: None        Relationship status: None      Intimate partner violence        Fear of current or ex partner: None        Emotionally abused: None        Physically abused: None        Forced sexual activity: None    Other Topics      Concerns:        None    Social History Narrative      None      Current Outpatient Medications:  diclofenac (VOLTAREN) 75 MG EC tablet, TAKE 1 TABLET BY MOUTH TWICE DAILY, Disp: 60 tablet, Rfl: 2  aspirin EC 81 MG EC tablet, Take 1 tablet by mouth 2 times daily (with meals), Disp: 60 tablet, Rfl: 0  Multiple Vitamins-Minerals (THERAPEUTIC MULTIVITAMIN-MINERALS) tablet, Take 1 tablet by mouth daily, Disp: , Rfl:   metoprolol succinate (TOPROL XL) 50 MG extended release tablet, Take 50 mg by mouth nightly , Disp: , Rfl:   losartan-hydrochlorothiazide (HYZAAR) 100-25 MG per tablet, Take 1 tablet by mouth daily , Disp: , Rfl:   omeprazole (PRILOSEC) 40 MG delayed release capsule, Take 40 mg by mouth daily , Disp: , Rfl:   doxazosin (CARDURA) 2 MG tablet, Take 2 mg by mouth nightly , Disp: , Rfl:   citalopram (CELEXA) 20 MG tablet, Take 20 mg by mouth nightly , Disp: , Rfl:   aMILoride (MIDAMOR) 5 MG tablet, Take 5 mg by mouth 2 times daily , Disp: , Rfl:   ascorbic acid (VITAMIN C) 1000 MG tablet, Take 1,000 mg by mouth daily , Disp: , Rfl:     No current facility-administered medications for this visit.         -- Ciprofloxacin     --  anxiety   -- Doxycycline     --  anxiety   -- Penicillins -- Rash    VITAL SIGNS:  Temp 97.8 °F (36.6 °C)   Ht 6' 1\" (1.854 m)   Wt 225 lb (102.1 kg)   BMI 29.69 kg/m²   Examination today, left knee is full extension to 125 degrees flexion. Patella seems to track based on the midline. He is sore in the lateral third of the patella to direct palpation. He is a small effusion but no warmth or erythema. Still has good medial lateral stability. Makes good quadriceps contraction. 3 views of the left knee show he has a vertical fracture with proximately centimeter of lateral displacement of the lateral third of the patella. The button appears to be in good position. Impression: Vertical patella fracture left knee. Plan: Get CT scan to see the comminution, views as well as possible loosening of the button is completed.

## 2020-06-04 ENCOUNTER — HOSPITAL ENCOUNTER (OUTPATIENT)
Dept: CT IMAGING | Age: 70
Discharge: HOME | End: 2020-06-04
Payer: MEDICARE

## 2020-06-04 PROCEDURE — 73700 CT LOWER EXTREMITY W/O DYE: CPT

## 2020-06-08 ENCOUNTER — OFFICE VISIT (OUTPATIENT)
Dept: ORTHOPEDIC SURGERY | Age: 70
End: 2020-06-08
Payer: MEDICARE

## 2020-06-08 VITALS — TEMPERATURE: 98.1 F | WEIGHT: 225 LBS | BODY MASS INDEX: 29.82 KG/M2 | HEIGHT: 73 IN

## 2020-06-08 PROCEDURE — G8427 DOCREV CUR MEDS BY ELIG CLIN: HCPCS | Performed by: ORTHOPAEDIC SURGERY

## 2020-06-08 PROCEDURE — G8417 CALC BMI ABV UP PARAM F/U: HCPCS | Performed by: ORTHOPAEDIC SURGERY

## 2020-06-08 PROCEDURE — 1123F ACP DISCUSS/DSCN MKR DOCD: CPT | Performed by: ORTHOPAEDIC SURGERY

## 2020-06-08 PROCEDURE — 3017F COLORECTAL CA SCREEN DOC REV: CPT | Performed by: ORTHOPAEDIC SURGERY

## 2020-06-08 PROCEDURE — 99213 OFFICE O/P EST LOW 20 MIN: CPT | Performed by: ORTHOPAEDIC SURGERY

## 2020-06-08 PROCEDURE — 4040F PNEUMOC VAC/ADMIN/RCVD: CPT | Performed by: ORTHOPAEDIC SURGERY

## 2020-06-08 PROCEDURE — 1036F TOBACCO NON-USER: CPT | Performed by: ORTHOPAEDIC SURGERY

## 2020-06-08 NOTE — PROGRESS NOTES
Date:  2020    Name:  Shilip Morales  Address:  [address] [place] [address]    :  1950      Age:   71 y.o.    SSN:  [id number]      Medical Record Number:  [id number]    Reason for Visit:    Chief Complaint    Results (CT Scan)      DOS:2020     HPI: Myrna Alonzo is a 71 y.o. male here today for follow-up of left knee CT scan to assess the extent of his left patella sagittal oriented fracture, and to assess for any patellar component loosening. He has a left total knee replacement done by Dr Julius Morse which then necessitated a patellar button revision and proximal re-alignment on 2019 for lateral patellar subluxation. He otherwise had a normal recovery from this second surgery, but for about 4 weeks now, he has vague anterior and lateral ariana-patellar knee discomfort that is specifically worse with climbing stairs. He has no pain at rest. He has no extensor deficits, no giving way or mechanical symptoms. He denied a history of inciting trauma. His xrays showed the incident lateral patellar facet fracture. ROS: Review of systems reviewed from Patient History Form completed today and available in the patient's chart under the Media tab.        Past Medical History:   Diagnosis Date    AAA (abdominal aortic aneurysm) (HCC)     GERD (gastroesophageal reflux disease)     mild intermittent    Hypertension         Past Surgical History:   Procedure Laterality Date    COLONOSCOPY      JOINT REPLACEMENT      left knee    KNEE ARTHROSCOPY      one on right, two on left    REVISION TOTAL KNEE ARTHROPLASTY Left 2019    REVISION LEFT PATELLAR BUTTON performed by Carmen Fritz MD at [facility]       Family History   Problem Relation Age of Onset    Rheum Arthritis Mother     High Blood Pressure Father     Cancer Father         throat       Social History     Socioeconomic History    Marital status: Single     Spouse name: None  Number of children: None    Years of education: None    Highest education level: None   Occupational History    None   Social Needs    Financial resource strain: None    Food insecurity     Worry: None     Inability: None    Transportation needs     Medical: None     Non-medical: None   Tobacco Use    Smoking status: Never Smoker    Smokeless tobacco: Never Used   Substance and Sexual Activity    Alcohol use:  Yes     Alcohol/week: 7.0 standard drinks     Types: 7 Glasses of wine per week     Comment: 1-2 glasses wine daily    Drug use: No    Sexual activity: None   Lifestyle    Physical activity     Days per week: None     Minutes per session: None    Stress: None   Relationships    Social connections     Talks on phone: None     Gets together: None     Attends Pentecostalism service: None     Active member of club or organization: None     Attends meetings of clubs or organizations: None     Relationship status: None    Intimate partner violence     Fear of current or ex partner: None     Emotionally abused: None     Physically abused: None     Forced sexual activity: None   Other Topics Concern    None   Social History Narrative    None       Current Outpatient Medications   Medication Sig Dispense Refill    diclofenac (VOLTAREN) 75 MG EC tablet TAKE 1 TABLET BY MOUTH TWICE DAILY 60 tablet 2    aspirin EC 81 MG EC tablet Take 1 tablet by mouth 2 times daily (with meals) 60 tablet 0    Multiple Vitamins-Minerals (THERAPEUTIC MULTIVITAMIN-MINERALS) tablet Take 1 tablet by mouth daily      metoprolol succinate (TOPROL XL) 50 MG extended release tablet Take 50 mg by mouth nightly       losartan-hydrochlorothiazide (HYZAAR) 100-25 MG per tablet Take 1 tablet by mouth daily       omeprazole (PRILOSEC) 40 MG delayed release capsule Take 40 mg by mouth daily       doxazosin (CARDURA) 2 MG tablet Take 2 mg by mouth nightly       citalopram (CELEXA) 20 MG tablet Take 20 mg by mouth nightly       [phone]    The encounter with Chinyere Thorpe was supervised by Dr Cortez Huddleston who personally examined the patient and reviewed the plan. This dictation was performed with a verbal recognition program (DRAGON) and it was checked for errors. It is possible that there are still dictated errors within this office note. If so, please bring any errors to my attention for an addendum. All efforts were made to ensure that this office note is accurate. Attestation:  I was physically present and performed my own examination of this patient and have discussed the case, including pertinent history and exam findings with the fellow. I agree with the documented assessment and plan. Deyanira Alonso.  Cortez Huddleston MD

## 2020-06-18 ENCOUNTER — OFFICE VISIT (OUTPATIENT)
Dept: ORTHOPEDIC SURGERY | Age: 70
End: 2020-06-18
Payer: MEDICARE

## 2020-06-18 VITALS
WEIGHT: 225.09 LBS | TEMPERATURE: 97.9 F | HEART RATE: 64 BPM | SYSTOLIC BLOOD PRESSURE: 139 MMHG | HEIGHT: 73 IN | DIASTOLIC BLOOD PRESSURE: 82 MMHG | BODY MASS INDEX: 29.83 KG/M2

## 2020-06-18 PROBLEM — Z96.652 STATUS POST TOTAL LEFT KNEE REPLACEMENT: Status: ACTIVE | Noted: 2020-06-18

## 2020-06-18 PROBLEM — M25.562 CHRONIC PAIN OF LEFT KNEE: Status: ACTIVE | Noted: 2020-06-18

## 2020-06-18 PROBLEM — G89.29 CHRONIC PAIN OF LEFT KNEE: Status: ACTIVE | Noted: 2020-06-18

## 2020-06-18 PROCEDURE — G8417 CALC BMI ABV UP PARAM F/U: HCPCS | Performed by: PHYSICIAN ASSISTANT

## 2020-06-18 PROCEDURE — G8427 DOCREV CUR MEDS BY ELIG CLIN: HCPCS | Performed by: PHYSICIAN ASSISTANT

## 2020-06-18 PROCEDURE — 3017F COLORECTAL CA SCREEN DOC REV: CPT | Performed by: PHYSICIAN ASSISTANT

## 2020-06-18 PROCEDURE — 4040F PNEUMOC VAC/ADMIN/RCVD: CPT | Performed by: PHYSICIAN ASSISTANT

## 2020-06-18 PROCEDURE — 1036F TOBACCO NON-USER: CPT | Performed by: PHYSICIAN ASSISTANT

## 2020-06-18 PROCEDURE — 1123F ACP DISCUSS/DSCN MKR DOCD: CPT | Performed by: PHYSICIAN ASSISTANT

## 2020-06-18 PROCEDURE — 99203 OFFICE O/P NEW LOW 30 MIN: CPT | Performed by: PHYSICIAN ASSISTANT

## 2020-06-18 NOTE — PROGRESS NOTES
orders below, if any, were placed during this visit:   No orders of the defined types were placed in this encounter. Treatment Plan:   -Discussed with patient that these types of fractures do very well and tend to heal very well on their own we will continue to monitor him for the next 6 weeks and do a repeat x-ray and if limited to no improvement is noted we may have to order a bone stimulator to help with bony healing to ensure appropriate resolution of the patient's condition.  -Patient has minimal pain that is very well isolated to the fracture site and the fracture fragments appear stable for which we will continue to monitor as long as patient is tolerable to this and no further worsening of the fracture occurs. -Advised for patient to utilize Cho-Pat knee strap brace on that knee to help reduce irritation and stress across the patella. -Advised patient to follow-up in 6 weeks for repeat evaluation repeat x-rays at that time.  -Advised patient to continue utilization of his prescribed anti-inflammatory on an as-needed basis as well as ice. Advised patient not to pursue heavy or high impact activities as well as to avoid bicycle and kneeling.          CARLOS Sánchez

## 2020-08-20 ENCOUNTER — OFFICE VISIT (OUTPATIENT)
Dept: ORTHOPEDIC SURGERY | Age: 70
End: 2020-08-20
Payer: MEDICARE

## 2020-08-20 VITALS
TEMPERATURE: 97.9 F | DIASTOLIC BLOOD PRESSURE: 75 MMHG | HEART RATE: 80 BPM | BODY MASS INDEX: 29.83 KG/M2 | SYSTOLIC BLOOD PRESSURE: 111 MMHG | WEIGHT: 225.09 LBS | HEIGHT: 73 IN

## 2020-08-20 PROCEDURE — 1123F ACP DISCUSS/DSCN MKR DOCD: CPT | Performed by: ORTHOPAEDIC SURGERY

## 2020-08-20 PROCEDURE — G8417 CALC BMI ABV UP PARAM F/U: HCPCS | Performed by: ORTHOPAEDIC SURGERY

## 2020-08-20 PROCEDURE — 3017F COLORECTAL CA SCREEN DOC REV: CPT | Performed by: ORTHOPAEDIC SURGERY

## 2020-08-20 PROCEDURE — G8427 DOCREV CUR MEDS BY ELIG CLIN: HCPCS | Performed by: ORTHOPAEDIC SURGERY

## 2020-08-20 PROCEDURE — 4040F PNEUMOC VAC/ADMIN/RCVD: CPT | Performed by: ORTHOPAEDIC SURGERY

## 2020-08-20 PROCEDURE — 99212 OFFICE O/P EST SF 10 MIN: CPT | Performed by: ORTHOPAEDIC SURGERY

## 2020-08-20 PROCEDURE — 1036F TOBACCO NON-USER: CPT | Performed by: ORTHOPAEDIC SURGERY

## 2020-08-20 NOTE — PROGRESS NOTES
Patient Name: Neena Vargas  : 1950  DOS: 2020        Chief Complaint:   Chief Complaint   Patient presents with    Follow-up     left knee    pain currently better in the knee with continued use of chopat type strap. Pain in the anterior knee only occasionally with getting out of low position and toilet. History of Present Illness:  Neena Vargas is a 79 y.o. male who presents with a chief complaint of continued complaints of pain in the knee with irritation with walking, stairs and with overuse. Pain in the knee regularly with swelling and discomfort. Increase in pain over the past several weeks time. Patient notes that he initially had a left total knee arthroplasty performed by Dr. Tyron Tafoya in 2018 and recovered well but shortly after he had to have a patellar button revision on 2019 and did very well until about 5 weeks ago when he started noticing pain on the anterior aspect of the left knee notes it was very similar to when the kneecap needed to be revised describes it as a achy type pain especially when going up and down stairs. Has to now go up and down them 1 step at a time. He then followed up with Dr. Tyron Tafoya had an x-ray on 2020 and noted to have a patellar fracture and was advised to follow-up with Dr. Ernandez Alert for further evaluation and possible revision/repair of fracture. Patient notes overall he can tolerate and is managing his pain rates it as a 2-3 out of 10. Denies utilization of braces or medications beyond his prescribed anti-inflammatory denies utilization of any assistive walking devices. Denies numbness burning tingling radiating pains on the leg. Medical History  Current Medications:   Prior to Admission medications    Medication Sig Start Date End Date Taking?  Authorizing Provider   diclofenac (VOLTAREN) 75 MG EC tablet TAKE 1 TABLET BY MOUTH TWICE DAILY 19  Yes Gracie Chaudhari MD   aspirin EC 81 MG EC tablet Take 1 tablet by mouth 2 times daily (with meals) 6/4/19  Yes Katharina So, DO   Multiple Vitamins-Minerals (THERAPEUTIC MULTIVITAMIN-MINERALS) tablet Take 1 tablet by mouth daily   Yes Historical Provider, MD   metoprolol succinate (TOPROL XL) 50 MG extended release tablet Take 50 mg by mouth nightly  6/14/17  Yes Historical Provider, MD   losartan-hydrochlorothiazide (HYZAAR) 100-25 MG per tablet Take 1 tablet by mouth daily  6/14/17  Yes Historical Provider, MD   omeprazole (PRILOSEC) 40 MG delayed release capsule Take 40 mg by mouth daily  6/14/17  Yes Historical Provider, MD   doxazosin (CARDURA) 2 MG tablet Take 2 mg by mouth nightly  6/14/17  Yes Historical Provider, MD   citalopram (CELEXA) 20 MG tablet Take 20 mg by mouth nightly  6/14/17  Yes Historical Provider, MD   aMILoride (MIDAMOR) 5 MG tablet Take 5 mg by mouth 2 times daily    Yes Historical Provider, MD   ascorbic acid (VITAMIN C) 1000 MG tablet Take 1,000 mg by mouth daily    Yes Historical Provider, MD     Allergies: Allergies   Allergen Reactions    Ciprofloxacin      anxiety    Doxycycline      anxiety    Penicillins Rash       Review of Systems:     Review of Systems ______  Constitutional: Negative for fever and diaphoresis. ____________  Respiratory: Negative for shortness of breath.  ________  Gastrointestinal: Negative for abdominal pain. ________  Musculoskeletal: Positive for joint pain. ____  Skin: Negative for itching. ____  Neurological: Negative for loss of consciousness.  ______________  All other systems reviewed and negative     Past Medical History:   Diagnosis Date    AAA (abdominal aortic aneurysm) (HCC)     GERD (gastroesophageal reflux disease)     mild intermittent    Hypertension      Family History   Problem Relation Age of Onset    Rheum Arthritis Mother     High Blood Pressure Father     Cancer Father         throat     Social History     Socioeconomic History    Marital status: Single Spouse name: Not on file    Number of children: Not on file    Years of education: Not on file    Highest education level: Not on file   Occupational History    Not on file   Social Needs    Financial resource strain: Not on file    Food insecurity     Worry: Not on file     Inability: Not on file    Transportation needs     Medical: Not on file     Non-medical: Not on file   Tobacco Use    Smoking status: Never Smoker    Smokeless tobacco: Never Used   Substance and Sexual Activity    Alcohol use: Yes     Alcohol/week: 7.0 standard drinks     Types: 7 Glasses of wine per week     Comment: 1-2 glasses wine daily    Drug use: No    Sexual activity: Not on file   Lifestyle    Physical activity     Days per week: Not on file     Minutes per session: Not on file    Stress: Not on file   Relationships    Social connections     Talks on phone: Not on file     Gets together: Not on file     Attends Mu-ism service: Not on file     Active member of club or organization: Not on file     Attends meetings of clubs or organizations: Not on file     Relationship status: Not on file    Intimate partner violence     Fear of current or ex partner: Not on file     Emotionally abused: Not on file     Physically abused: Not on file     Forced sexual activity: Not on file   Other Topics Concern    Not on file   Social History Narrative    Not on file         Physical Exam __  Constitutional: She is oriented to person, place, and time and well-developed, well-nourished, and in no distress. No distress. ____  HENT:   Head: Normocephalic and atraumatic. ____  Eyes: Conjunctivae are normal. ________  Cardiovascular: Intact distal pulses. ____  Pulmonary/Chest: Effort normal. ________________________  Neurological: She is alert and oriented to person, place, and time. ____  Skin: Skin is dry.  She is not diaphoretic. ____  Psychiatric: Mood, affect and judgment normal. ______          Assessment   Vital Signs: Vitals:    08/20/20 1111   BP: 111/75   Pulse: 80   Temp: 97.9 °F (36.6 °C)       left Knee shows   no obvious pseudolaxity, minimal pain with weight bearing, non-antalgic gait and no palpable osteophytes. Inspection: Mild anterior swelling. Swelling is present with minimal effusion. The posterior aspect of the knee does not appears to be full with no tenderness. There is no erythema, rash, or ecchymosis. Range of Motion:  Right 0 to 120 degrees left 0 to 120 degrees    No pain with varus or valgus testing    There is no noted deformity    Strength:  Hamstrings rated: 5/5. Quadriceps rated: 5/5    Palpation: There is minimal tenderness along the anterior and lateral patella along fracture line. Minimal to no tenderness noted on medial lateral patellar joint line as well as medial and lateral joint line. Special Tests: Patellar Compression test is positive. Valgus & Varus test is negative. Skin: There are no rashes, ulcerations or lesions. Gait: Gait pattern is non-antalgic  Skin shows no rashes/ecchymosis to the affected area, no hyperesthesias, no discoloration, no temperature or color discrepancies. NEUROLOGICALLY: There is no evidence for sensory or motor deficits in the extremity. Coordination appears full with no spacticity or rigidity. Reflexes appear to be symmetric. Distal circulation intact. No signs of RSD.        Additional Comments:  Very isolated localized tenderness to the patellar fracture on the anterior medial lateral aspect of the patella reduced compared to prior exam.         Procedure(s):   No new procedure performed at today's date    Diagnostic Test Findings:   Xray   Have reviewed the xrays 8/20/20  3 view x-ray of the left knee AP, lateral and sunrise views were performed on 6/1/2020 and my impression is: Vertical fracture to the lateral aspect of the left patella with 4 mm displacement with small bone growth in between the fracture fragments that has not increased and shows no bridging. .  No clear evidence of patellar button loosening. Assessment and Plan:                The orders below, if any, were placed during this visit:   Orders Placed This Encounter   Procedures    XR KNEE LEFT (3 VIEWS)              Treatment Plan:   -Discussed with patient that these types of fractures do very well and tend to heal very well on their own we will continue to monitor him for the next 6 weeks      order a bone stimulator to help with bony healing to ensure appropriate resolution of the patient's condition. currently no increase in bone bridging despite 3 months of conservative managemnet. ie delayed healing of the patella fracture that could lead to nonunion    -Patient has minimal pain that is very well isolated to the fracture site and the fracture fragments appear stable for which we will continue to monitor as long as patient is tolerable to this and no further worsening of the fracture occurs. -Advised for patient to utilize Cho-Pat knee strap brace on that knee to help reduce irritation and stress across the patella. -Advised patient to follow-up in 6 weeks for repeat evaluation repeat x-rays at that time.  -Advised patient to continue utilization of his prescribed anti-inflammatory on an as-needed basis as well as ice. Advised patient not to pursue heavy or high impact activities as well as to avoid bicycle and kneeling.          Lauren Medrano MD

## 2020-08-24 PROBLEM — S82.025A CLOSED NONDISPLACED LONGITUDINAL FRACTURE OF LEFT PATELLA: Status: ACTIVE | Noted: 2020-08-24

## 2020-10-22 ENCOUNTER — OFFICE VISIT (OUTPATIENT)
Dept: ORTHOPEDIC SURGERY | Age: 70
End: 2020-10-22

## 2020-10-22 VITALS
HEART RATE: 57 BPM | SYSTOLIC BLOOD PRESSURE: 131 MMHG | BODY MASS INDEX: 30.48 KG/M2 | DIASTOLIC BLOOD PRESSURE: 78 MMHG | WEIGHT: 225 LBS | TEMPERATURE: 96.9 F | HEIGHT: 72 IN

## 2020-10-22 PROCEDURE — 99024 POSTOP FOLLOW-UP VISIT: CPT | Performed by: ORTHOPAEDIC SURGERY

## 2020-10-22 NOTE — PROGRESS NOTES
tablet Take 1 tablet by mouth 2 times daily (with meals) 6/4/19   Idania Ramirez, DO   Multiple Vitamins-Minerals (THERAPEUTIC MULTIVITAMIN-MINERALS) tablet Take 1 tablet by mouth daily    Historical Provider, MD   metoprolol succinate (TOPROL XL) 50 MG extended release tablet Take 50 mg by mouth nightly  6/14/17   Historical Provider, MD   losartan-hydrochlorothiazide (HYZAAR) 100-25 MG per tablet Take 1 tablet by mouth daily  6/14/17   Historical Provider, MD   omeprazole (PRILOSEC) 40 MG delayed release capsule Take 40 mg by mouth daily  6/14/17   Historical Provider, MD   doxazosin (CARDURA) 2 MG tablet Take 2 mg by mouth nightly  6/14/17   Historical Provider, MD   citalopram (CELEXA) 20 MG tablet Take 20 mg by mouth nightly  6/14/17   Historical Provider, MD   aMILoride (MIDAMOR) 5 MG tablet Take 5 mg by mouth 2 times daily     Historical Provider, MD   ascorbic acid (VITAMIN C) 1000 MG tablet Take 1,000 mg by mouth daily     Historical Provider, MD     Allergies: Allergies   Allergen Reactions    Ciprofloxacin      anxiety    Doxycycline      anxiety    Penicillins Rash       Review of Systems:     Review of Systems ______  Constitutional: Negative for fever and diaphoresis. ____________  Respiratory: Negative for shortness of breath.  ________  Gastrointestinal: Negative for abdominal pain. ________  Musculoskeletal: Positive for joint pain. ____  Skin: Negative for itching. ____  Neurological: Negative for loss of consciousness.  ______________  All other systems reviewed and negative     Past Medical History:   Diagnosis Date    AAA (abdominal aortic aneurysm) ([facility])     GERD (gastroesophageal reflux disease)     mild intermittent    Hypertension      Family History   Problem Relation Age of Onset    Rheum Arthritis Mother     High Blood Pressure Father     Cancer Father         throat     Social History     Socioeconomic History    Marital status: Single     Spouse name: Not on file    Number of children: Not on file    Years of education: Not on file    Highest education level: Not on file   Occupational History    Not on file   Social Needs    Financial resource strain: Not on file    Food insecurity     Worry: Not on file     Inability: Not on file    Transportation needs     Medical: Not on file     Non-medical: Not on file   Tobacco Use    Smoking status: Never Smoker    Smokeless tobacco: Never Used   Substance and Sexual Activity    Alcohol use: Yes     Alcohol/week: 7.0 standard drinks     Types: 7 Glasses of wine per week     Comment: 1-2 glasses wine daily    Drug use: No    Sexual activity: Not on file   Lifestyle    Physical activity     Days per week: Not on file     Minutes per session: Not on file    Stress: Not on file   Relationships    Social connections     Talks on phone: Not on file     Gets together: Not on file     Attends Mosque service: Not on file     Active member of club or organization: Not on file     Attends meetings of clubs or organizations: Not on file     Relationship status: Not on file    Intimate partner violence     Fear of current or ex partner: Not on file     Emotionally abused: Not on file     Physically abused: Not on file     Forced sexual activity: Not on file   Other Topics Concern    Not on file   Social History Narrative    Not on file         Physical Exam __  Constitutional: She is oriented to person, place, and time and well-developed, well-nourished, and in no distress. No distress. ____  HENT:   Head: Normocephalic and atraumatic. ____  Eyes: Conjunctivae are normal. ________  Cardiovascular: Intact distal pulses. ____  Pulmonary/Chest: Effort normal. ________________________  Neurological: She is alert and oriented to person, place, and time. ____  Skin: Skin is dry.  She is not diaphoretic. ____  Psychiatric: Mood, affect and judgment normal. ______          Assessment   Vital Signs:      Vitals:    10/22/20 1215   BP: 131/78   Pulse: 57   Temp: 96.9 °F (36.1 °C)       left Knee shows   no obvious pseudolaxity, minimal pain with weight bearing, non-antalgic gait and no palpable osteophytes. Inspection: Mild anterior swelling. Swelling is present with minimal effusion. The posterior aspect of the knee does not appears to be full with no tenderness. There is no erythema, rash, or ecchymosis. Range of Motion:  Right 0 to 120 degrees left 0 to 120 degrees    No pain with varus or valgus testing    There is no noted deformity    Strength:  Hamstrings rated: 5/5. Quadriceps rated: 5/5    Palpation: There is minimal tenderness along the anterior and lateral patella along fracture line. Minimal to no tenderness noted on medial lateral patellar joint line as well as medial and lateral joint line. Special Tests: Patellar Compression test is positive. Valgus & Varus test is negative. Skin: There are no rashes, ulcerations or lesions. Gait: Gait pattern is non-antalgic  Skin shows no rashes/ecchymosis to the affected area, no hyperesthesias, no discoloration, no temperature or color discrepancies. NEUROLOGICALLY: There is no evidence for sensory or motor deficits in the extremity. Coordination appears full with no spacticity or rigidity. Reflexes appear to be symmetric. Distal circulation intact. No signs of RSD. Additional Comments:  Very isolated localized tenderness to the patellar fracture on the anterior medial lateral aspect of the patella reduced compared to prior exam.         Procedure(s):   No new procedure performed at today's date    Diagnostic Test Findings:   Xray   Have reviewed the xrays 8/20/20  3 view x-ray of the left knee AP, lateral and sunrise views were performed on 6/1/2020 and my impression is: Vertical fracture to the lateral aspect of the left patella with 4 mm displacement with small bone growth in between the fracture fragments that has not increased and shows no bridging. Pietro Hilt   No clear evidence of patellar button loosening. Assessment and Plan:                The orders below, if any, were placed during this visit:   Orders Placed This Encounter   Procedures    XR KNEE LEFT (3 VIEWS)     Standing Status:   Future     Standing Expiration Date:   11/22/2020     Order Specific Question:   Reason for exam:     Answer:   Pain              Treatment Plan:         -Advised for patient to utilize Cho-Pat knee strap brace on that knee to help reduce irritation and stress across the patella. -Advised patient to follow-up in 6 months for repeat evaluation repeat x-rays at that time.  -Advised patient to continue utilization of his prescribed anti-inflammatory on an as-needed basis as well as ice. Advised patient not to pursue heavy or high impact activities as well as to avoid bicycle and kneeling. He is currently going to travel for hunting to [facility].             Paz Maurer MD

## 2021-06-25 ENCOUNTER — TELEPHONE (OUTPATIENT)
Dept: ORTHOPEDIC SURGERY | Age: 71
End: 2021-06-25

## 2021-06-25 NOTE — TELEPHONE ENCOUNTER
PATIENT WANTS TO KNOW THE TIME FRAME THAT HE NEEDS TO HAVE BETWEEN TAKING ANTIBIOTICS AND A DENTAL PROCEDURE

## 2021-06-25 NOTE — TELEPHONE ENCOUNTER
Called patient to let him know we would have an answer on Monday. Patient stated his last surgery was about three years ago.

## 2021-06-28 NOTE — TELEPHONE ENCOUNTER
Called and spoke with patient -- he went to see dentist and was premedicated -- advised patient at this point it is patient's choice

## 2021-09-22 ENCOUNTER — OFFICE VISIT (OUTPATIENT)
Dept: ORTHOPEDIC SURGERY | Age: 71
End: 2021-09-22
Payer: MEDICARE

## 2021-09-22 VITALS — HEIGHT: 72 IN | BODY MASS INDEX: 30.48 KG/M2 | WEIGHT: 225 LBS

## 2021-09-22 DIAGNOSIS — M25.561 ACUTE PAIN OF RIGHT KNEE: Primary | ICD-10-CM

## 2021-09-22 DIAGNOSIS — M25.562 ACUTE PAIN OF LEFT KNEE: ICD-10-CM

## 2021-09-22 PROCEDURE — L1812 KO ELASTIC W/JOINTS PRE OTS: HCPCS | Performed by: ORTHOPAEDIC SURGERY

## 2021-09-22 PROCEDURE — 3017F COLORECTAL CA SCREEN DOC REV: CPT | Performed by: ORTHOPAEDIC SURGERY

## 2021-09-22 PROCEDURE — 1036F TOBACCO NON-USER: CPT | Performed by: ORTHOPAEDIC SURGERY

## 2021-09-22 PROCEDURE — G8428 CUR MEDS NOT DOCUMENT: HCPCS | Performed by: ORTHOPAEDIC SURGERY

## 2021-09-22 PROCEDURE — 1123F ACP DISCUSS/DSCN MKR DOCD: CPT | Performed by: ORTHOPAEDIC SURGERY

## 2021-09-22 PROCEDURE — 4040F PNEUMOC VAC/ADMIN/RCVD: CPT | Performed by: ORTHOPAEDIC SURGERY

## 2021-09-22 PROCEDURE — 99214 OFFICE O/P EST MOD 30 MIN: CPT | Performed by: ORTHOPAEDIC SURGERY

## 2021-09-22 PROCEDURE — G8417 CALC BMI ABV UP PARAM F/U: HCPCS | Performed by: ORTHOPAEDIC SURGERY

## 2021-09-22 PROCEDURE — 20610 DRAIN/INJ JOINT/BURSA W/O US: CPT | Performed by: ORTHOPAEDIC SURGERY

## 2021-09-22 RX ORDER — ROPIVACAINE HYDROCHLORIDE 5 MG/ML
30 INJECTION, SOLUTION EPIDURAL; INFILTRATION; PERINEURAL ONCE
Status: COMPLETED | OUTPATIENT
Start: 2021-09-22 | End: 2021-09-22

## 2021-09-22 RX ORDER — LIDOCAINE HYDROCHLORIDE 10 MG/ML
20 INJECTION, SOLUTION INFILTRATION; PERINEURAL ONCE
Status: COMPLETED | OUTPATIENT
Start: 2021-09-22 | End: 2021-09-22

## 2021-09-22 RX ORDER — TRIAMCINOLONE ACETONIDE 40 MG/ML
40 INJECTION, SUSPENSION INTRA-ARTICULAR; INTRAMUSCULAR ONCE
Status: COMPLETED | OUTPATIENT
Start: 2021-09-22 | End: 2021-09-22

## 2021-09-22 RX ADMIN — LIDOCAINE HYDROCHLORIDE 20 ML: 10 INJECTION, SOLUTION INFILTRATION; PERINEURAL at 11:59

## 2021-09-22 RX ADMIN — ROPIVACAINE HYDROCHLORIDE 30 ML: 5 INJECTION, SOLUTION EPIDURAL; INFILTRATION; PERINEURAL at 12:00

## 2021-09-22 RX ADMIN — TRIAMCINOLONE ACETONIDE 40 MG: 40 INJECTION, SUSPENSION INTRA-ARTICULAR; INTRAMUSCULAR at 11:59

## 2021-09-22 NOTE — PROGRESS NOTES
Patient Name: Tracy Villasenor  : 1950  DOS: 2021        Chief Complaint:   Chief Complaint   Patient presents with    Knee Pain     Right knee pain and achiness 4/10    Follow-up     F/U Left knee fractured knee cap     Currently:     Steps with increase in pain at the patella on the left side. Right side with increase in pain medially especially with sitting. Irritation of the right side more than the left. Previously:    pain currently better in the knee with continued use of chopat type strap. Pain in the anterior knee only occasionally with getting out of low position and toilet. History of Present Illness:  Tracy Villasenor is a 70 y.o. male who presents with a chief complaint of continued complaints of pain in the knee with irritation with walking, stairs and with overuse. Pain in the knee regularly with swelling and discomfort. Increase in pain over the past several weeks time. Patient notes that he initially had a left total knee arthroplasty performed by Dr. Fidelia Mcclelland in 2018 and recovered well but shortly after he had to have a patellar button revision on 2019 and did very well until about 5 weeks ago when he started noticing pain on the anterior aspect of the left knee notes it was very similar to when the kneecap needed to be revised describes it as a achy type pain especially when going up and down stairs. Has to now go up and down them 1 step at a time. He then followed up with Dr. Fidelia Mcclelland had an x-ray on 2020 and noted to have a patellar fracture and was advised to follow-up with Dr. Ivis Randall for further evaluation and possible revision/repair of fracture. Patient notes overall he can tolerate and is managing his pain rates it as a 2-3 out of 10. Denies utilization of braces or medications beyond his prescribed anti-inflammatory denies utilization of any assistive walking devices.   Denies numbness burning tingling radiating pains on the leg. Medical History  Current Medications:   Prior to Admission medications    Medication Sig Start Date End Date Taking? Authorizing Provider   diclofenac (VOLTAREN) 75 MG EC tablet TAKE 1 TABLET BY MOUTH TWICE DAILY 8/8/19   Jorge Minor MD   aspirin EC 81 MG EC tablet Take 1 tablet by mouth 2 times daily (with meals) 6/4/19   Ruba Rocks, DO   Multiple Vitamins-Minerals (THERAPEUTIC MULTIVITAMIN-MINERALS) tablet Take 1 tablet by mouth daily    Historical Provider, MD   metoprolol succinate (TOPROL XL) 50 MG extended release tablet Take 50 mg by mouth nightly  6/14/17   Historical Provider, MD   losartan-hydrochlorothiazide (HYZAAR) 100-25 MG per tablet Take 1 tablet by mouth daily  6/14/17   Historical Provider, MD   omeprazole (PRILOSEC) 40 MG delayed release capsule Take 40 mg by mouth daily  6/14/17   Historical Provider, MD   doxazosin (CARDURA) 2 MG tablet Take 2 mg by mouth nightly  6/14/17   Historical Provider, MD   citalopram (CELEXA) 20 MG tablet Take 20 mg by mouth nightly  6/14/17   Historical Provider, MD   aMILoride (MIDAMOR) 5 MG tablet Take 5 mg by mouth 2 times daily     Historical Provider, MD   ascorbic acid (VITAMIN C) 1000 MG tablet Take 1,000 mg by mouth daily     Historical Provider, MD     Allergies: Allergies   Allergen Reactions    Ciprofloxacin      anxiety    Doxycycline      anxiety    Penicillins Rash       Review of Systems:     Review of Systems ______  Constitutional: Negative for fever and diaphoresis. ____________  Respiratory: Negative for shortness of breath.  ________  Gastrointestinal: Negative for abdominal pain. ________  Musculoskeletal: Positive for joint pain. ____  Skin: Negative for itching. ____  Neurological: Negative for loss of consciousness.  ______________  All other systems reviewed and negative     Past Medical History:   Diagnosis Date    AAA (abdominal aortic aneurysm) (HCC)     GERD (gastroesophageal reflux disease)     mild intermittent    Hypertension      Family History   Problem Relation Age of Onset    Rheum Arthritis Mother     High Blood Pressure Father     Cancer Father         throat     Social History     Socioeconomic History    Marital status: Single     Spouse name: Not on file    Number of children: Not on file    Years of education: Not on file    Highest education level: Not on file   Occupational History    Not on file   Tobacco Use    Smoking status: Never Smoker    Smokeless tobacco: Never Used   Vaping Use    Vaping Use: Never used   Substance and Sexual Activity    Alcohol use: Yes     Alcohol/week: 7.0 standard drinks     Types: 7 Glasses of wine per week     Comment: 1-2 glasses wine daily    Drug use: No    Sexual activity: Not on file   Other Topics Concern    Not on file   Social History Narrative    Not on file     Social Determinants of Health     Financial Resource Strain:     Difficulty of Paying Living Expenses:    Food Insecurity:     Worried About Running Out of Food in the Last Year:     Ran Out of Food in the Last Year:    Transportation Needs:     Lack of Transportation (Medical):  Lack of Transportation (Non-Medical):    Physical Activity:     Days of Exercise per Week:     Minutes of Exercise per Session:    Stress:     Feeling of Stress :    Social Connections:     Frequency of Communication with Friends and Family:     Frequency of Social Gatherings with Friends and Family:     Attends Islam Services:     Active Member of Clubs or Organizations:     Attends Club or Organization Meetings:     Marital Status:    Intimate Partner Violence:     Fear of Current or Ex-Partner:     Emotionally Abused:     Physically Abused:     Sexually Abused:          Physical Exam __  Constitutional: She is oriented to person, place, and time and well-developed, well-nourished, and in no distress. No distress. ____  HENT:   Head: Normocephalic and atraumatic. ____  Eyes: Conjunctivae are normal. ________  Cardiovascular: Intact distal pulses. ____  Pulmonary/Chest: Effort normal. ________________________  Neurological: She is alert and oriented to person, place, and time. ____  Skin: Skin is dry. She is not diaphoretic. ____  Psychiatric: Mood, affect and judgment normal. ______          Assessment   Vital Signs: There were no vitals filed for this visit. left Knee shows   no obvious pseudolaxity, minimal pain with weight bearing, non-antalgic gait and no palpable osteophytes. Inspection: Mild anterior swelling. The posterior aspect of the knee does not appears to be full with no tenderness. There is no erythema, rash, or ecchymosis. Range of Motion:  Right 0 to 120 degrees left 0 to 120 degrees    No pain with varus or valgus testing    There is no noted deformity    Strength:  Hamstrings rated: 5/5. Quadriceps rated: 5/5    Palpation: There is minimal tenderness along the anterior and lateral patella along fracture line. Minimal to no tenderness noted on medial lateral patellar joint line as well as medial and lateral joint line. Special Tests: Patellar Compression test is positive. Valgus & Varus test is negative. Skin: There are no rashes, ulcerations or lesions. Gait: Gait pattern is non-antalgic  Skin shows no rashes/ecchymosis to the affected area, no hyperesthesias, no discoloration, no temperature or color discrepancies. NEUROLOGICALLY: There is no evidence for sensory or motor deficits in the extremity. Coordination appears full with no spacticity or rigidity. Reflexes appear to be symmetric. Distal circulation intact. No signs of RSD.        Additional Comments:  Very isolated localized tenderness mild with reduced defect to the patellar fracture on the anterior medial lateral aspect of the patella reduced compared to prior exam.     Right knee with medial joint space pain and pseudolaxity with pain in the medial tibial anterior bone and midpoint of the femur medially. No obvious meniscal signs. No major effusion. Varus alignment. Procedure(s):    Injection Procedure Note  Procedure Details     Verbal consent was obtained for the procedure. The right knee(s) was prepped with iodine and the skin was anesthetized. Using a 22 gauge needle the right knee(s) joint is injected with 2 ml 1% lidocaine and 2 ml of triamcinolone (KENALOG) 40mg/ml under the lateral aspect of the knee. The injection site was cleansed with topical isopropyl alcohol and a dressing was applied. Complications:  None; patient tolerated the procedure well. Diagnostic Test Findings:   Xray   Have reviewed the xrays 9/22/21  3 view x-ray of the left knee AP, lateral and sunrise views were performed on 6/1/2020 and my impression is: Vertical fracture to the lateral aspect of the left patella with 4 mm displacement with small bone growth in between the fracture fragments that has not increased and shows no bridging. .  No clear evidence of patellar button loosening. Right knee with varus alignment and meidal joint space narrowing. Assessment and Plan:                The orders below, if any, were placed during this visit:   Orders Placed This Encounter   Procedures    XR KNEE RIGHT (MIN 4 VIEWS)     Standing Status:   Future     Number of Occurrences:   1     Standing Expiration Date:   9/22/2022    XR KNEE LEFT (MIN 4 VIEWS)     Standing Status:   Future     Number of Occurrences:   1     Standing Expiration Date:   9/22/2022              Treatment Plan:         -Advised for patient to utilize Cho-Pat knee strap brace on that knee to help reduce irritation and stress across the patella. -Advised patient to follow-up in 6 months for repeat evaluation repeat x-rays at that time.  -Advised patient to continue utilization of his prescribed anti-inflammatory    He is currently going to travel for hunting to [facility].      Ordered gel injection for the right knee. Plan for his own exercise for the bilateral legs and potnetial discussion regarding BMAC on the left patella as needed and the right knee for the progressive pain.           Oswald Jimenez MD

## 2022-04-07 ENCOUNTER — TELEPHONE (OUTPATIENT)
Dept: ORTHOPEDIC SURGERY | Age: 72
End: 2022-04-07

## 2022-04-08 NOTE — TELEPHONE ENCOUNTER
LM: I let the patient know that I will forward this message to our medical records department. If he needs to take X-Rays with him, he will need to call us back and let us know so we can make a disc. Forward message to [email or web address].

## 2022-04-08 NOTE — TELEPHONE ENCOUNTER
PATIENT STATES THAT HE WOULD LIKE THE [facility] FOR THE XRAYS.  PLEASE ADVISE IF HE NEEDS TO

## 2022-04-11 NOTE — TELEPHONE ENCOUNTER
LM: He can  disc at the [facility] office. Route to [place]: Can you please make a disc for this patient? Let's do the X-rays for the last year.       Thanks

## 2022-07-26 ENCOUNTER — TELEPHONE (OUTPATIENT)
Dept: ORTHOPEDIC SURGERY | Age: 72
End: 2022-07-26

## 2023-03-09 ENCOUNTER — TELEPHONE (OUTPATIENT)
Dept: ORTHOPEDIC SURGERY | Age: 73
End: 2023-03-09

## 2023-03-22 ENCOUNTER — TELEPHONE (OUTPATIENT)
Dept: ORTHOPEDIC SURGERY | Age: 73
End: 2023-03-22

## 2023-03-22 NOTE — TELEPHONE ENCOUNTER
Called and LVM to see if patient can come in @ 1pm rather than 2pm due to MD having to leave earlier.

## 2023-03-24 ENCOUNTER — OFFICE VISIT (OUTPATIENT)
Dept: ORTHOPEDIC SURGERY | Age: 73
End: 2023-03-24
Payer: MEDICARE

## 2023-03-24 VITALS — BODY MASS INDEX: 29.82 KG/M2 | HEIGHT: 73 IN | WEIGHT: 225 LBS

## 2023-03-24 DIAGNOSIS — M25.561 ACUTE PAIN OF RIGHT KNEE: ICD-10-CM

## 2023-03-24 DIAGNOSIS — Z96.652 HISTORY OF REVISION OF TOTAL REPLACEMENT OF LEFT KNEE JOINT: Primary | ICD-10-CM

## 2023-03-24 DIAGNOSIS — M17.11 PRIMARY OSTEOARTHRITIS OF RIGHT KNEE: ICD-10-CM

## 2023-03-24 PROCEDURE — G8484 FLU IMMUNIZE NO ADMIN: HCPCS | Performed by: STUDENT IN AN ORGANIZED HEALTH CARE EDUCATION/TRAINING PROGRAM

## 2023-03-24 PROCEDURE — 3017F COLORECTAL CA SCREEN DOC REV: CPT | Performed by: STUDENT IN AN ORGANIZED HEALTH CARE EDUCATION/TRAINING PROGRAM

## 2023-03-24 PROCEDURE — 1036F TOBACCO NON-USER: CPT | Performed by: STUDENT IN AN ORGANIZED HEALTH CARE EDUCATION/TRAINING PROGRAM

## 2023-03-24 PROCEDURE — G8427 DOCREV CUR MEDS BY ELIG CLIN: HCPCS | Performed by: STUDENT IN AN ORGANIZED HEALTH CARE EDUCATION/TRAINING PROGRAM

## 2023-03-24 PROCEDURE — G8417 CALC BMI ABV UP PARAM F/U: HCPCS | Performed by: STUDENT IN AN ORGANIZED HEALTH CARE EDUCATION/TRAINING PROGRAM

## 2023-03-24 PROCEDURE — 99204 OFFICE O/P NEW MOD 45 MIN: CPT | Performed by: STUDENT IN AN ORGANIZED HEALTH CARE EDUCATION/TRAINING PROGRAM

## 2023-03-24 PROCEDURE — 1123F ACP DISCUSS/DSCN MKR DOCD: CPT | Performed by: STUDENT IN AN ORGANIZED HEALTH CARE EDUCATION/TRAINING PROGRAM

## 2023-03-24 PROCEDURE — 20610 DRAIN/INJ JOINT/BURSA W/O US: CPT | Performed by: STUDENT IN AN ORGANIZED HEALTH CARE EDUCATION/TRAINING PROGRAM

## 2023-03-24 RX ORDER — TRIAMCINOLONE ACETONIDE 40 MG/ML
40 INJECTION, SUSPENSION INTRA-ARTICULAR; INTRAMUSCULAR ONCE
Status: COMPLETED | OUTPATIENT
Start: 2023-03-24 | End: 2023-03-24

## 2023-03-24 RX ORDER — LIDOCAINE HYDROCHLORIDE 10 MG/ML
4 INJECTION, SOLUTION INFILTRATION; PERINEURAL ONCE
Status: COMPLETED | OUTPATIENT
Start: 2023-03-24 | End: 2023-03-24

## 2023-03-24 RX ORDER — CELECOXIB 100 MG/1
100 CAPSULE ORAL 2 TIMES DAILY
Qty: 60 CAPSULE | Refills: 5 | Status: SHIPPED | OUTPATIENT
Start: 2023-03-24

## 2023-03-24 RX ADMIN — TRIAMCINOLONE ACETONIDE 40 MG: 40 INJECTION, SUSPENSION INTRA-ARTICULAR; INTRAMUSCULAR at 13:01

## 2023-03-24 RX ADMIN — LIDOCAINE HYDROCHLORIDE 4 ML: 10 INJECTION, SOLUTION INFILTRATION; PERINEURAL at 13:00

## 2023-03-24 NOTE — LETTER
April 3, 2023      James Chaidez  No address on file      Patient: Wing Masterson   MR Number: [id number]   YOB: 1950   Date of Visit: 3/24/2023       Dear James Chaidez:    Thank you for referring Matthew Mendenhall to me for evaluation/treatment. Below are the relevant portions of my assessment and plan of care. If you have questions, please do not hesitate to call me. I look forward to following Alfredo Crowder along with you.     Sincerely,        Gunner Patel MD

## 2023-03-29 NOTE — PROGRESS NOTES
Dr Briana Donovan      Date /Time 3/24/2023       1:38 PM EDT  [facility]             1950   Location  [facility]  MRN [id number]                Chief Complaint   Patient presents with    Knee Pain     Np- BILATERAL KNEE   PREV MAHESH PT  REVISION 6/4/2019 LT KNEE PATELLAR BUTTON        History of Present Illness  Kelli Knutson is a 67 y.o. male who presents with predominantly right knee pain. He has a history of multiple left knee surgeries related to knee replacement and subsequent patella fracture. This has since healed up. Is having significant right knee pain that is affecting his activities of daily living. Previous treatment has consisted of Voltaren tablets rest and activity modification. Sent in consultation by Richard Prater. Past History  Past Medical History:   Diagnosis Date    AAA (abdominal aortic aneurysm)     GERD (gastroesophageal reflux disease)     mild intermittent    Hypertension      Past Surgical History:   Procedure Laterality Date    COLONOSCOPY      JOINT REPLACEMENT      left knee    KNEE ARTHROSCOPY      one on right, two on left    REVISION TOTAL KNEE ARTHROPLASTY Left 6/4/2019    REVISION LEFT PATELLAR BUTTON performed by Lisa Mathur MD at [facility]     Social History     Tobacco Use    Smoking status: Never    Smokeless tobacco: Never   Substance Use Topics    Alcohol use:  Yes     Alcohol/week: 7.0 standard drinks     Types: 7 Glasses of wine per week     Comment: 1-2 glasses wine daily      Current Outpatient Medications on File Prior to Visit   Medication Sig Dispense Refill    diclofenac (VOLTAREN) 50 MG EC tablet Take 1 tablet by mouth 3 times daily (with meals) 60 tablet 3    diclofenac (VOLTAREN) 75 MG EC tablet TAKE 1 TABLET BY MOUTH TWICE DAILY 60 tablet 2    aspirin EC 81 MG EC tablet Take 1 tablet by mouth 2 times daily (with meals) 60 tablet 0    Multiple

## 2023-11-13 ENCOUNTER — OFFICE VISIT (OUTPATIENT)
Dept: ORTHOPEDIC SURGERY | Age: 73
End: 2023-11-13

## 2023-11-13 ENCOUNTER — TELEPHONE (OUTPATIENT)
Dept: ORTHOPEDIC SURGERY | Age: 73
End: 2023-11-13

## 2023-11-13 DIAGNOSIS — S76.111A RUPTURE OF RIGHT QUADRICEPS TENDON, INITIAL ENCOUNTER: ICD-10-CM

## 2023-11-13 DIAGNOSIS — M25.561 ACUTE PAIN OF RIGHT KNEE: Primary | ICD-10-CM

## 2023-11-13 NOTE — TELEPHONE ENCOUNTER
Appointment Request     Patient requesting earlier appointment: Yes  Appointment offered to patient: PATIENT IS SCHEDULED FOR 11/27 BUT WOULD LIKE LIKE TO BE SEEN AS SOON AS POSSIBLE.  [address]  Patient Contact Number: [phone]

## 2023-11-14 ENCOUNTER — TELEPHONE (OUTPATIENT)
Dept: ORTHOPEDIC SURGERY | Age: 73
End: 2023-11-14

## 2023-11-14 NOTE — TELEPHONE ENCOUNTER
BRACE IS NOT FITTING PROPERLY AND IS NEEDING TO HAVE IT REFITTED. HE IS ABLE TO GO ANYWHERE AT ANYTIME. PLEASE CALL PATIENT TO DISCUSS AT CELL NUMBER ON FILE.

## 2023-11-15 ENCOUNTER — TELEPHONE (OUTPATIENT)
Dept: ORTHOPEDIC SURGERY | Age: 73
End: 2023-11-15

## 2023-11-15 NOTE — TELEPHONE ENCOUNTER
Auth: [employer]  Date: 11/30/23 thru 12/31/23  Reference # [id number]  Spoke with: Online  Type of SX: Outpatient  Location: [facility]  CPT: 23706   DX: [phone]  SX area: Rt quad   Insurance: [employer]

## 2023-11-15 NOTE — PROGRESS NOTES
Aleja Scott Menninger    Age 68 y.o.    male    1950    MRN [id number]    11/30/2023  Arrival Time_____________  OR Time____________[address]     Procedure(s):  LEFT QUADRACEPS TENDON REPAIR NOTE: ADDUCTOR CANAL BLOCK                      General   Surgeon(s):  Abran Dance, MD      DAY ADMIT ___  SDS/OP ___  OUTPT IN BED ___        Phone [phone] (home)     PCP _____________________ Phone_________________ Epic ( ) Epic CE ( ) Appt ________    ADDITIONAL INFO __________________________________ Cardio/Consult _____________    NOTES _____________________________________________________________________    ____________________________________________________________________________    PAT APPT DATE:________ TIME: ________  FAXED QAD: _______  (__) H&P w/ Hospitalist  __________________________________________________________________________  Preop Nurse phone screen complete: _____________  (__) CBC     (__) W/ DIFF ___________     (__) Hgb A1C    ___________  (__) CHEST X RAY   __________  (__) LIPID PROFILE  ___________  (__) EKG   __________  (__) PT-INR / APTT  ___________  (__) PFT's   __________  (__) BMP   ___________  (__) CAROTIDS  __________  (__) CMP   ___________  (__) VEIN MAPPING  __________  (__) U/A   ___________  (__) HISTORY & PHYSICAL __________  (__) URINE C & S  ___________  (__) CARDIAC CLEARANCE __________  (__) U/A W/ FLEX  ___________  (__) PULM.  CLEARANCE __________  (__) SERUM PREGNANCY ___________  (__) Check Epic DOS orders __________  (__) TYPE & SCREEN __________repeat ( ) (__)  __________________ __________  (__) Albumin / Prealbumin ___________  (__)  __________________ __________  (__) TRANSFERRIN  ___________  (__)  __________________ __________  (__) LIVER PROFILE  ___________  (__)  __________________ __________  (__) MRSA NASAL SWAB ___________  (__) URINE PREG DOS __________  (__) SED RATE  ___________  (__) BLOOD SUGAR DOS __________  (__) C-REACTIVE PROTEIN ___________    (__) VITAMIN D HYDROXY ___________  (__) [address]    ________________________    (__) ACE/ ARBS: _____________________     (__) BETABLOCKERS __________________    Ride home/Contact #__________________________

## 2023-11-15 NOTE — TELEPHONE ENCOUNTER
A FEW ITEMS    POST OP T SCOPE IS SLIDING DOWN LEG STATES IT IS USELESS  2.  EARLIER SX TIME/DATE--WAITING FOR Yarsani TO  CONFIRM PER KB

## 2023-11-15 NOTE — PROGRESS NOTES
Dr Dwayne Rand      Date /Time 11/13/2023       3:06 PM EST  Name Maricruz Morales             1950   Location  [address] Thu Hyatt  MRN [id number]                Chief Complaint   Patient presents with    Knee Pain     RT KNEE INJURY FALL  NO IMAGING          History of Present Illness  Olga Lidia Graves is a 68 y.o. male who presents with an acute right knee injury. His knee buckled with him planting his leg and he has had weakness and issues with the leg giving out since that time. He has been unable to extend his leg since that time. He has pain in the posterior medial and posterior lateral knee when attempting to extend his leg. Pain is otherwise well controlled his main issue is weakness and gait difficulties. Pain is isolated to the knee. No numbness or tingling distally. Sent in consultation by Eloisa Anne Past History  Past Medical History:   Diagnosis Date    AAA (abdominal aortic aneurysm)     GERD (gastroesophageal reflux disease)     mild intermittent    Hypertension      Past Surgical History:   Procedure Laterality Date    COLONOSCOPY      JOINT REPLACEMENT      left knee    KNEE ARTHROSCOPY      one on right, two on left    REVISION TOTAL KNEE ARTHROPLASTY Left 6/4/2019    REVISION LEFT PATELLAR BUTTON performed by Lamont Benson MD at [facility]     Social History     Tobacco Use    Smoking status: Never    Smokeless tobacco: Never   Substance Use Topics    Alcohol use:  Yes     Alcohol/week: 7.0 standard drinks of alcohol     Types: 7 Glasses of wine per week     Comment: 1-2 glasses wine daily      Current Outpatient Medications on File Prior to Visit   Medication Sig Dispense Refill    celecoxib (CELEBREX) 100 MG capsule Take 1 capsule by mouth 2 times daily 60 capsule 5    diclofenac (VOLTAREN) 50 MG EC tablet Take 1 tablet by mouth 3 times daily (with meals) 60 tablet 3    diclofenac (VOLTAREN) 75 MG EC

## 2023-11-15 NOTE — PROGRESS NOTES
Penelope Skipone Menninger    Age 68 y.o.    male    1950    MRN [id number]    11/17/2023  Arrival Time_____________  OR Time____________[address]     Procedure(s):  RIGHT QUADRICEPS TENDON REPAIR NOTE: ADDUCTOR CANAL BLOCK                      General    Surgeon(s):  Gaurav Cohen, MD       Phone [phone] (home)     [facility]  Date  [address]  Cell         Work  _____________________________________________________________________  _____________________________________________________________________  _____________________________________________________________________  _____________________________________________________________________  _____________________________________________________________________    PCP _____________________________ Phone_________________     H&P  ________________  Bringing      Chart              Epic      DOS      Called________  EKG ________________   Bringing      Chart              Epic      DOS      Called________  LABS________________   Bringing     Chart              Epic      DOS      Called________  Cardiac Clearance ______ Bringing      Chart              Epic      DOS      Called________  Pulmonary Clearance____ Bringing      Chart              Epic      DOS      Called________    Cardiologist________________________ Phone___________________________  Pulmonologist_______________________Phone___________________________    ? Advance Directives   ? Christian concerns / Waiver on Chart            PAT Communications________________  ? Pre-op Instructions Given [address]          _________________________________  ? Directions to Surgery Center                          _________________________________  ? Transportation Home_______________      __________________________________  ?  Crutches/Walker__________________        __________________________________    ________Pre-op Orders   _______Transcribed    _______Wt.  ________Pharmacy          _______SCD

## 2023-11-16 ENCOUNTER — ANESTHESIA EVENT (OUTPATIENT)
Dept: OPERATING ROOM | Age: 73
End: 2023-11-16

## 2023-11-16 NOTE — PROGRESS NOTES
Date and time of surgery :   11/17/23 at 36           Arrival Time:  600     Bring Picture ID and insurance card. Please wear simple, loose fitting clothing to the hospital.   Odalis Peers not bring valuables (money, credit cards, checkbooks, etc.)   DO NOT wear any jewelry or piercings on day of surgery. All body piercing jewelry must be removed. If you have dentures, they will be removed before going to the OR; we will provide you a container. If you wear contact lenses or glasses, they will be removed; please bring a case for them. Shower the evening before or morning of surgery with antibacterial soap. Nothing to eat or drink after midnight the day before surgery. You may brush your teeth and gargle the morning of surgery. DO NOT SWALLOW WATER. Take only Metoprolol the morning of surgery with a sip of water, only if you normally take this in am.  Aspirin, Ibuprofen, Advil, Naproxen, Vitamin E and other Anti-inflammatory products and supplements should be stopped for 5 -7days before surgery or as directed by your physician. Do not smoke or drink any alcoholic beverages 24 hours prior to surgery. This includes NA Beer. Refrain from the usage of any recreational drugs, including non-prescribed prescription drugs. You MUST plan for a responsible adult to stay on site while you are here and take you home after your surgery. You will not be allowed to leave alone or drive yourself home. It is strongly suggested someone stay with you the first 24 hrs. Your surgery will be cancelled if you do not have a ride home. To help prevent infection, change your sheets the night before surgery. If you  have a Living Will and Durable Power of  for Healthcare, please bring in a copy. Notify your Surgeon if you develop any illness between now and time of surgery.  Cough, cold, fever, sore throat, nausea, vomiting, etc.  Please notify your surgeon if you experience dizziness, shortness of breath or blurred vision between now & the time of your surgery  To provide excellent care visitors will be limited to two per room at any given time. No visitors under the age of 15. If you use oxygen and have a portable tank please bring it with you the DOS  For your convenience [facility] has a pharmacy on site to fill your prescriptions. *Please call pre admission testing if you have any further questions             Emre Higgins      [phone]                            Address: [address] [place] [address]     When you pull into the hospital and are looking at the main hospital entrance, turn right. We are a tan building to the right of the main entrance. [address] over the door.                                                              Revision History

## 2023-11-17 ENCOUNTER — HOSPITAL ENCOUNTER (OUTPATIENT)
Age: 73
Setting detail: OUTPATIENT SURGERY
Discharge: HOME | End: 2023-11-17
Attending: STUDENT IN AN ORGANIZED HEALTH CARE EDUCATION/TRAINING PROGRAM | Admitting: STUDENT IN AN ORGANIZED HEALTH CARE EDUCATION/TRAINING PROGRAM
Payer: MEDICARE

## 2023-11-17 ENCOUNTER — ANESTHESIA (OUTPATIENT)
Dept: OPERATING ROOM | Age: 73
End: 2023-11-17

## 2023-11-17 VITALS
WEIGHT: 225 LBS | HEIGHT: 73 IN | RESPIRATION RATE: 20 BRPM | DIASTOLIC BLOOD PRESSURE: 77 MMHG | TEMPERATURE: 97 F | OXYGEN SATURATION: 98 % | HEART RATE: 91 BPM | SYSTOLIC BLOOD PRESSURE: 118 MMHG | BODY MASS INDEX: 29.82 KG/M2

## 2023-11-17 PROBLEM — I10 ESSENTIAL HYPERTENSION: Status: ACTIVE | Noted: 2023-11-17

## 2023-11-17 PROBLEM — Z01.810 PREOPERATIVE CARDIOVASCULAR EXAMINATION: Status: ACTIVE | Noted: 2023-11-17

## 2023-11-17 PROBLEM — I48.91 NEW ONSET A-FIB (HCC): Status: ACTIVE | Noted: 2023-11-17

## 2023-11-17 PROBLEM — G47.33 OSA (OBSTRUCTIVE SLEEP APNEA): Status: ACTIVE | Noted: 2023-11-17

## 2023-11-17 PROBLEM — E66.811 OBESITY (BMI 30.0-34.9): Status: ACTIVE | Noted: 2023-11-17

## 2023-11-17 PROBLEM — E66.9 OBESITY (BMI 30.0-34.9): Status: ACTIVE | Noted: 2023-11-17

## 2023-11-17 LAB
EKG ATRIAL RATE: 66 BPM
EKG DIAGNOSIS: NORMAL
EKG Q-T INTERVAL: 404 MS
EKG QRS DURATION: 92 MS
EKG QTC CALCULATION (BAZETT): 448 MS
EKG R AXIS: -16 DEGREES
EKG T AXIS: 8 DEGREES
EKG VENTRICULAR RATE: 74 BPM

## 2023-11-17 PROCEDURE — 99223 1ST HOSP IP/OBS HIGH 75: CPT | Performed by: INTERNAL MEDICINE

## 2023-11-17 PROCEDURE — 2580000003 HC RX 258: Performed by: ANESTHESIOLOGY

## 2023-11-17 PROCEDURE — 93010 ELECTROCARDIOGRAM REPORT: CPT | Performed by: INTERNAL MEDICINE

## 2023-11-17 PROCEDURE — 93005 ELECTROCARDIOGRAM TRACING: CPT | Performed by: ANESTHESIOLOGY

## 2023-11-17 PROCEDURE — C8929 TTE W OR WO FOL WCON,DOPPLER: HCPCS

## 2023-11-17 RX ORDER — SODIUM CHLORIDE 9 MG/ML
INJECTION, SOLUTION INTRAVENOUS PRN
Status: DISCONTINUED | OUTPATIENT
Start: 2023-11-17 | End: 2023-11-18 | Stop reason: HOSPADM

## 2023-11-17 RX ORDER — SODIUM CHLORIDE 0.9 % (FLUSH) 0.9 %
5-40 SYRINGE (ML) INJECTION PRN
Status: DISCONTINUED | OUTPATIENT
Start: 2023-11-17 | End: 2023-11-18 | Stop reason: HOSPADM

## 2023-11-17 RX ORDER — SODIUM CHLORIDE 0.9 % (FLUSH) 0.9 %
5-40 SYRINGE (ML) INJECTION EVERY 12 HOURS SCHEDULED
Status: DISCONTINUED | OUTPATIENT
Start: 2023-11-17 | End: 2023-11-18 | Stop reason: HOSPADM

## 2023-11-17 RX ORDER — LIDOCAINE HYDROCHLORIDE 10 MG/ML
0.3 INJECTION, SOLUTION EPIDURAL; INFILTRATION; INTRACAUDAL; PERINEURAL
Status: DISCONTINUED | OUTPATIENT
Start: 2023-11-17 | End: 2023-11-18 | Stop reason: HOSPADM

## 2023-11-17 RX ORDER — CEFAZOLIN SODIUM IN 0.9 % NACL 2 G/100 ML
2000 PLASTIC BAG, INJECTION (ML) INTRAVENOUS
Status: DISCONTINUED | OUTPATIENT
Start: 2023-11-17 | End: 2023-11-17 | Stop reason: HOSPADM

## 2023-11-17 RX ORDER — SODIUM CHLORIDE, SODIUM LACTATE, POTASSIUM CHLORIDE, CALCIUM CHLORIDE 600; 310; 30; 20 MG/100ML; MG/100ML; MG/100ML; MG/100ML
INJECTION, SOLUTION INTRAVENOUS CONTINUOUS
Status: DISCONTINUED | OUTPATIENT
Start: 2023-11-17 | End: 2023-11-18 | Stop reason: HOSPADM

## 2023-11-17 RX ADMIN — SODIUM CHLORIDE, POTASSIUM CHLORIDE, SODIUM LACTATE AND CALCIUM CHLORIDE: 600; 310; 30; 20 INJECTION, SOLUTION INTRAVENOUS at 06:38

## 2023-11-17 ASSESSMENT — ENCOUNTER SYMPTOMS
WHEEZING: 0
RIGHT EYE: 0
LEFT EYE: 0
STRIDOR: 0
SLEEP DISTURBANCES DUE TO BREATHING: 1
HEMATOCHEZIA: 0
SNORING: 1
SHORTNESS OF BREATH: 0
HEMATEMESIS: 0

## 2023-11-17 ASSESSMENT — PAIN SCALES - GENERAL: PAINLEVEL_OUTOF10: 2

## 2023-11-17 NOTE — CONSULTS
Cardiac Electrophysiology Consult Note      Physician requesting consult: Fer Thomas MD   Reason for Consult: atrial fibrillation   Admission Date: 11/17/2023  Room/Bed:  ASC OR POOL/NONE    Assessment:     1. Atrial fibrillation: patient has first documented episode of atrial fibrillation. Associated symptoms: None     History of cardioversion: No prior history of cardioversion  History of AF ablation: No history of atrial fibrillation ablation in the past  History of heart surgery/procedure: no  History of other cardiac arrhythmias: no    Current use of anti-arrhythmic drugs: Not currently on anti-arrhythmic drugs  Previous other use of anti-arrhythmic drugs: Not previously on any anti-arrhythmic drugs    Overall LV function: Normal left ventricular systolic function  Size of left atrium: Normal LA size  Significant cardiac valvular disease: No significant valve disease    Family history of atrial fibrillation: No    Alcohol consumption: Moderate alcohol consumption  Caffeine consumption: No/minimal caffeine intake  Smoking status: non-smoker  Obstructive sleep apnea: On CPAP/BiPAP therapy  Exercise status: Mild exercise routine    I had a discussion with the patient about issues related to atrial fibrillation (including etiology, disease progression patterns, stroke risk and rate control issues). Patient had his questions answered to his satisfaction. Patient has a PXT0YR5-PCFj score of 2 [Age over 65 (1 point) and Hypertension (1 point)]  Longterm anticoagulation is: recommended  Current anticoagulation: None  Bleeding issues reported: no  Renal function: Normal renal function  Thyroid function:  no TSH on file    Patient with new finding of atrial fibrillation as he was being prepared for orthopedic surgery. He does not feel any palpitations. It is likely that his arrhythmia has been ongoing for some time with good rate control on beta-blocker therapy. No evidence of volume overload. \"PROTIME\" in the last 72 hours. No results for input(s): \"CKMB\", \"TROPONINI\" in the last 72 hours. No results for input(s): \"PROBNP\" in the last 72 hours. Imaging:    No results found.       Signed by: oBb Montaño MD

## 2023-11-17 NOTE — PROGRESS NOTES
Pt procedure cancelled;Dr Garcia requests patient wait until he speaks with cardiologist; pt updated

## 2023-11-17 NOTE — ANESTHESIA PRE PROCEDURE
Department of Anesthesiology  Preprocedure Note       Name:  Vipin Guadalupe   Age:  68 y.o.  :  1950                                          MRN:  [id number]         Date:  2023      Surgeon: Jaziel Davila):  Abran Dance, MD    Procedure: Procedure(s):  RIGHT QUADRICEPS TENDON REPAIR NOTE: ADDUCTOR CANAL BLOCK    Medications prior to admission:   Prior to Admission medications    Medication Sig Start Date End Date Taking?  Authorizing Provider   celecoxib (CELEBREX) 100 MG capsule Take 1 capsule by mouth 2 times daily  Patient not taking: Reported on    Abran Dance, MD   diclofenac (VOLTAREN) 50 MG EC tablet Take 1 tablet by mouth 3 times daily (with meals) 21   Catia Horn MD   diclofenac (VOLTAREN) 75 MG EC tablet TAKE 1 TABLET BY MOUTH TWICE DAILY 19   Shaila Hudsno MD   aspirin EC 81 MG EC tablet Take 1 tablet by mouth 2 times daily (with meals) 19   Marta Forte,    Multiple Vitamins-Minerals (THERAPEUTIC MULTIVITAMIN-MINERALS) tablet Take 1 tablet by mouth daily    Wily Camara MD   metoprolol succinate (TOPROL XL) 50 MG extended release tablet Take 1 tablet by mouth nightly 17   Wily Camara MD   losartan-hydrochlorothiazide (HYZAAR) 100-25 MG per tablet Take 1 tablet by mouth daily 17   Wily Camara MD   omeprazole (PRILOSEC) 40 MG delayed release capsule Take 1 capsule by mouth daily 17   Wily Camara MD   doxazosin (CARDURA) 2 MG tablet Take 1 tablet by mouth nightly 17   Wily Camara MD   citalopram (CELEXA) 20 MG tablet Take 1 tablet by mouth nightly 17   Wily Camara MD   aMILoride (MIDAMOR) 5 MG tablet Take 1 tablet by mouth 2 times daily    Wily Camara MD   ascorbic acid (VITAMIN C) 1000 MG tablet Take 1 tablet by mouth daily    Wily Camara MD       Current medications:    Current Facility-Administered

## 2023-11-17 NOTE — PROGRESS NOTES
Patient dressed; Dr Concetta Ayon states pt may eat; waiting for cardiologist to come speak with patient.

## 2023-11-20 NOTE — PROGRESS NOTES
Collin Latisha Menninger    Age 68 y.o.    male    1950    N [id number]    11/21/2023  Arrival Time_____________  OR Time____________[address]     Procedure(s):  RIGHT QUADRICEPS TENDON REPAIR NOTE: ADDUCTOR CANAL BLOCK                      General    Surgeon(s):  Hussein Villarreal, MD       Phone [phone] (home)     [facility]  Date  [address]  Cell         Work  _____________________________________________________________________  _____________________________________________________________________  _____________________________________________________________________  _____________________________________________________________________  _____________________________________________________________________    PCP _____________________________ Phone_________________     H&P  ________________  Bringing      Chart              Epic      DOS      Called________  EKG ________________   Bringing      Chart              Epic      DOS      Called________  LABS________________   Bringing     Chart              Epic      DOS      Called________  Cardiac Clearance ______ Bringing      Chart              Epic      DOS      Called________  Pulmonary Clearance____ Bringing      Chart              Epic      DOS      Called________    Cardiologist________________________ Phone___________________________  Pulmonologist_______________________Phone___________________________    ? Advance Directives   ? Rastafari concerns / Waiver on Chart            PAT Communications________________  ? Pre-op Instructions Given [address]          _________________________________  ? Directions to Surgery Center                          _________________________________  ? Transportation Home_______________      __________________________________  ?  Crutches/Walker__________________        __________________________________    ________Pre-op Orders   _______Transcribed    _______Wt.  ________Pharmacy          _______SCD

## 2023-11-20 NOTE — PROGRESS NOTES
Date and time of surgery :   11/21/23 at 1345           Arrival Time:  1145     Bring Picture ID and insurance card. Please wear simple, loose fitting clothing to the hospital.   Mindy Tanner not bring valuables (money, credit cards, checkbooks, etc.)   DO NOT wear any jewelry or piercings on day of surgery. All body piercing jewelry must be removed. If you have dentures, they will be removed before going to the OR; we will provide you a container. If you wear contact lenses or glasses, they will be removed; please bring a case for them. Shower the evening before or morning of surgery with antibacterial soap. Nothing to eat or drink after midnight the day before surgery. You may brush your teeth and gargle the morning of surgery. DO NOT SWALLOW WATER. Take Metoprolol and Doxazosin the morning of your surgery with a sip of water if you normally take these in the morning. Aspirin, Ibuprofen, Advil, Naproxen, Vitamin E and other Anti-inflammatory products and supplements should be stopped for 5 -7days before surgery or as directed by your physician. Stop Celebrex now. Do not smoke or drink any alcoholic beverages 24 hours prior to surgery. This includes NA Beer. Refrain from the usage of any recreational drugs, including non-prescribed prescription drugs. You MUST plan for a responsible adult to stay on site while you are here and take you home after your surgery. You will not be allowed to leave alone or drive yourself home. It is strongly suggested someone stay with you the first 24 hrs. Your surgery will be cancelled if you do not have a ride home. To help prevent infection, change your sheets the night before surgery. If you  have a Living Will and Durable Power of  for Healthcare, please bring in a copy. Notify your Surgeon if you develop any illness between now and time of surgery.  Cough, cold, fever, sore throat, nausea, vomiting, etc.  Please notify your surgeon if you experience dizziness,

## 2023-11-21 ENCOUNTER — ANESTHESIA (OUTPATIENT)
Dept: OPERATING ROOM | Age: 73
End: 2023-11-21
Payer: MEDICARE

## 2023-11-21 ENCOUNTER — APPOINTMENT (OUTPATIENT)
Dept: GENERAL RADIOLOGY | Age: 73
End: 2023-11-21
Attending: STUDENT IN AN ORGANIZED HEALTH CARE EDUCATION/TRAINING PROGRAM
Payer: MEDICARE

## 2023-11-21 ENCOUNTER — HOSPITAL ENCOUNTER (OUTPATIENT)
Age: 73
Setting detail: OUTPATIENT SURGERY
Discharge: HOME | End: 2023-11-21
Attending: STUDENT IN AN ORGANIZED HEALTH CARE EDUCATION/TRAINING PROGRAM | Admitting: STUDENT IN AN ORGANIZED HEALTH CARE EDUCATION/TRAINING PROGRAM
Payer: MEDICARE

## 2023-11-21 ENCOUNTER — ANESTHESIA EVENT (OUTPATIENT)
Dept: OPERATING ROOM | Age: 73
End: 2023-11-21
Payer: MEDICARE

## 2023-11-21 VITALS
SYSTOLIC BLOOD PRESSURE: 117 MMHG | TEMPERATURE: 97.3 F | DIASTOLIC BLOOD PRESSURE: 89 MMHG | HEART RATE: 75 BPM | WEIGHT: 228 LBS | RESPIRATION RATE: 17 BRPM | HEIGHT: 73 IN | OXYGEN SATURATION: 92 % | BODY MASS INDEX: 30.22 KG/M2

## 2023-11-21 DIAGNOSIS — Z96.652 S/P REVISION OF TOTAL KNEE, LEFT: ICD-10-CM

## 2023-11-21 DIAGNOSIS — M66.251 NONTRAUMATIC RUPTURE OF RIGHT QUADRICEPS TENDON: Primary | ICD-10-CM

## 2023-11-21 PROCEDURE — 7100000000 HC PACU RECOVERY - FIRST 15 MIN: Performed by: STUDENT IN AN ORGANIZED HEALTH CARE EDUCATION/TRAINING PROGRAM

## 2023-11-21 PROCEDURE — 7100000010 HC PHASE II RECOVERY - FIRST 15 MIN: Performed by: STUDENT IN AN ORGANIZED HEALTH CARE EDUCATION/TRAINING PROGRAM

## 2023-11-21 PROCEDURE — 7100000001 HC PACU RECOVERY - ADDTL 15 MIN: Performed by: STUDENT IN AN ORGANIZED HEALTH CARE EDUCATION/TRAINING PROGRAM

## 2023-11-21 PROCEDURE — 2720000010 HC SURG SUPPLY STERILE: Performed by: STUDENT IN AN ORGANIZED HEALTH CARE EDUCATION/TRAINING PROGRAM

## 2023-11-21 PROCEDURE — 7100000011 HC PHASE II RECOVERY - ADDTL 15 MIN: Performed by: STUDENT IN AN ORGANIZED HEALTH CARE EDUCATION/TRAINING PROGRAM

## 2023-11-21 PROCEDURE — 2580000003 HC RX 258: Performed by: STUDENT IN AN ORGANIZED HEALTH CARE EDUCATION/TRAINING PROGRAM

## 2023-11-21 PROCEDURE — 3600000004 HC SURGERY LEVEL 4 BASE: Performed by: STUDENT IN AN ORGANIZED HEALTH CARE EDUCATION/TRAINING PROGRAM

## 2023-11-21 PROCEDURE — 3700000000 HC ANESTHESIA ATTENDED CARE: Performed by: STUDENT IN AN ORGANIZED HEALTH CARE EDUCATION/TRAINING PROGRAM

## 2023-11-21 PROCEDURE — 6370000000 HC RX 637 (ALT 250 FOR IP): Performed by: ANESTHESIOLOGY

## 2023-11-21 PROCEDURE — 6360000002 HC RX W HCPCS

## 2023-11-21 PROCEDURE — 3600000014 HC SURGERY LEVEL 4 ADDTL 15MIN: Performed by: STUDENT IN AN ORGANIZED HEALTH CARE EDUCATION/TRAINING PROGRAM

## 2023-11-21 PROCEDURE — 2500000003 HC RX 250 WO HCPCS

## 2023-11-21 PROCEDURE — A4217 STERILE WATER/SALINE, 500 ML: HCPCS | Performed by: STUDENT IN AN ORGANIZED HEALTH CARE EDUCATION/TRAINING PROGRAM

## 2023-11-21 PROCEDURE — 6360000002 HC RX W HCPCS: Performed by: STUDENT IN AN ORGANIZED HEALTH CARE EDUCATION/TRAINING PROGRAM

## 2023-11-21 PROCEDURE — 2709999900 HC NON-CHARGEABLE SUPPLY: Performed by: STUDENT IN AN ORGANIZED HEALTH CARE EDUCATION/TRAINING PROGRAM

## 2023-11-21 PROCEDURE — 64447 NJX AA&/STRD FEMORAL NRV IMG: CPT | Performed by: ANESTHESIOLOGY

## 2023-11-21 PROCEDURE — 2500000003 HC RX 250 WO HCPCS: Performed by: STUDENT IN AN ORGANIZED HEALTH CARE EDUCATION/TRAINING PROGRAM

## 2023-11-21 PROCEDURE — 2580000003 HC RX 258

## 2023-11-21 PROCEDURE — 3700000001 HC ADD 15 MINUTES (ANESTHESIA): Performed by: STUDENT IN AN ORGANIZED HEALTH CARE EDUCATION/TRAINING PROGRAM

## 2023-11-21 PROCEDURE — 2580000003 HC RX 258: Performed by: ANESTHESIOLOGY

## 2023-11-21 RX ORDER — OXYCODONE HYDROCHLORIDE 5 MG/1
5 TABLET ORAL
Status: DISCONTINUED | OUTPATIENT
Start: 2023-11-21 | End: 2023-11-21 | Stop reason: HOSPADM

## 2023-11-21 RX ORDER — CEFAZOLIN SODIUM IN 0.9 % NACL 2 G/100 ML
2000 PLASTIC BAG, INJECTION (ML) INTRAVENOUS
Status: COMPLETED | OUTPATIENT
Start: 2023-11-21 | End: 2023-11-21

## 2023-11-21 RX ORDER — METHOCARBAMOL 750 MG/1
750 TABLET, FILM COATED ORAL 4 TIMES DAILY
Qty: 40 TABLET | Refills: 0 | Status: SHIPPED | OUTPATIENT
Start: 2023-11-21 | End: 2023-12-01

## 2023-11-21 RX ORDER — GLYCOPYRROLATE 0.2 MG/ML
INJECTION INTRAMUSCULAR; INTRAVENOUS PRN
Status: DISCONTINUED | OUTPATIENT
Start: 2023-11-21 | End: 2023-11-21 | Stop reason: SDUPTHER

## 2023-11-21 RX ORDER — SODIUM CHLORIDE 9 MG/ML
INJECTION, SOLUTION INTRAVENOUS PRN
Status: DISCONTINUED | OUTPATIENT
Start: 2023-11-21 | End: 2023-11-21 | Stop reason: HOSPADM

## 2023-11-21 RX ORDER — MIDAZOLAM HYDROCHLORIDE 1 MG/ML
INJECTION INTRAMUSCULAR; INTRAVENOUS PRN
Status: DISCONTINUED | OUTPATIENT
Start: 2023-11-21 | End: 2023-11-21 | Stop reason: SDUPTHER

## 2023-11-21 RX ORDER — LIDOCAINE HYDROCHLORIDE 20 MG/ML
INJECTION, SOLUTION EPIDURAL; INFILTRATION; INTRACAUDAL; PERINEURAL PRN
Status: DISCONTINUED | OUTPATIENT
Start: 2023-11-21 | End: 2023-11-21 | Stop reason: SDUPTHER

## 2023-11-21 RX ORDER — MEPERIDINE HYDROCHLORIDE 50 MG/ML
12.5 INJECTION INTRAMUSCULAR; INTRAVENOUS; SUBCUTANEOUS EVERY 5 MIN PRN
Status: DISCONTINUED | OUTPATIENT
Start: 2023-11-21 | End: 2023-11-21 | Stop reason: HOSPADM

## 2023-11-21 RX ORDER — ONDANSETRON 2 MG/ML
4 INJECTION INTRAMUSCULAR; INTRAVENOUS
Status: DISCONTINUED | OUTPATIENT
Start: 2023-11-21 | End: 2023-11-21 | Stop reason: HOSPADM

## 2023-11-21 RX ORDER — OXYCODONE HYDROCHLORIDE 5 MG/1
5 TABLET ORAL EVERY 6 HOURS PRN
Qty: 28 TABLET | Refills: 0 | Status: SHIPPED | OUTPATIENT
Start: 2023-11-21 | End: 2023-11-28

## 2023-11-21 RX ORDER — MAGNESIUM HYDROXIDE 1200 MG/15ML
LIQUID ORAL CONTINUOUS PRN
Status: COMPLETED | OUTPATIENT
Start: 2023-11-21 | End: 2023-11-21

## 2023-11-21 RX ORDER — PROCHLORPERAZINE EDISYLATE 5 MG/ML
5 INJECTION INTRAMUSCULAR; INTRAVENOUS
Status: DISCONTINUED | OUTPATIENT
Start: 2023-11-21 | End: 2023-11-21 | Stop reason: HOSPADM

## 2023-11-21 RX ORDER — PHENYLEPHRINE HCL IN 0.9% NACL 1 MG/10 ML
SYRINGE (ML) INTRAVENOUS PRN
Status: DISCONTINUED | OUTPATIENT
Start: 2023-11-21 | End: 2023-11-21 | Stop reason: SDUPTHER

## 2023-11-21 RX ORDER — ONDANSETRON 2 MG/ML
INJECTION INTRAMUSCULAR; INTRAVENOUS PRN
Status: DISCONTINUED | OUTPATIENT
Start: 2023-11-21 | End: 2023-11-21 | Stop reason: SDUPTHER

## 2023-11-21 RX ORDER — SODIUM CHLORIDE 0.9 % (FLUSH) 0.9 %
5-40 SYRINGE (ML) INJECTION PRN
Status: DISCONTINUED | OUTPATIENT
Start: 2023-11-21 | End: 2023-11-21 | Stop reason: HOSPADM

## 2023-11-21 RX ORDER — DEXAMETHASONE SODIUM PHOSPHATE 10 MG/ML
INJECTION, SOLUTION INTRAMUSCULAR; INTRAVENOUS PRN
Status: DISCONTINUED | OUTPATIENT
Start: 2023-11-21 | End: 2023-11-21 | Stop reason: SDUPTHER

## 2023-11-21 RX ORDER — SODIUM CHLORIDE 0.9 % (FLUSH) 0.9 %
5-40 SYRINGE (ML) INJECTION EVERY 12 HOURS SCHEDULED
Status: DISCONTINUED | OUTPATIENT
Start: 2023-11-21 | End: 2023-11-21 | Stop reason: HOSPADM

## 2023-11-21 RX ORDER — PROPOFOL 10 MG/ML
INJECTION, EMULSION INTRAVENOUS PRN
Status: DISCONTINUED | OUTPATIENT
Start: 2023-11-21 | End: 2023-11-21 | Stop reason: SDUPTHER

## 2023-11-21 RX ORDER — ACETAMINOPHEN 500 MG
1000 TABLET ORAL 3 TIMES DAILY PRN
Qty: 180 TABLET | Refills: 0 | Status: SHIPPED | OUTPATIENT
Start: 2023-11-21

## 2023-11-21 RX ORDER — EPHEDRINE SULFATE 50 MG/ML
INJECTION INTRAVENOUS PRN
Status: DISCONTINUED | OUTPATIENT
Start: 2023-11-21 | End: 2023-11-21 | Stop reason: SDUPTHER

## 2023-11-21 RX ORDER — SODIUM CHLORIDE, SODIUM LACTATE, POTASSIUM CHLORIDE, CALCIUM CHLORIDE 600; 310; 30; 20 MG/100ML; MG/100ML; MG/100ML; MG/100ML
INJECTION, SOLUTION INTRAVENOUS CONTINUOUS
Status: DISCONTINUED | OUTPATIENT
Start: 2023-11-21 | End: 2023-11-21 | Stop reason: HOSPADM

## 2023-11-21 RX ORDER — IPRATROPIUM BROMIDE AND ALBUTEROL SULFATE 2.5; .5 MG/3ML; MG/3ML
1 SOLUTION RESPIRATORY (INHALATION)
Status: DISCONTINUED | OUTPATIENT
Start: 2023-11-21 | End: 2023-11-21 | Stop reason: HOSPADM

## 2023-11-21 RX ORDER — LIDOCAINE HYDROCHLORIDE 10 MG/ML
1 INJECTION, SOLUTION EPIDURAL; INFILTRATION; INTRACAUDAL; PERINEURAL
Status: DISCONTINUED | OUTPATIENT
Start: 2023-11-21 | End: 2023-11-21 | Stop reason: HOSPADM

## 2023-11-21 RX ORDER — KETAMINE HYDROCHLORIDE 50 MG/ML
INJECTION, SOLUTION, CONCENTRATE INTRAMUSCULAR; INTRAVENOUS PRN
Status: DISCONTINUED | OUTPATIENT
Start: 2023-11-21 | End: 2023-11-21 | Stop reason: SDUPTHER

## 2023-11-21 RX ORDER — BUPIVACAINE HYDROCHLORIDE AND EPINEPHRINE 5; 5 MG/ML; UG/ML
INJECTION, SOLUTION PERINEURAL PRN
Status: DISCONTINUED | OUTPATIENT
Start: 2023-11-21 | End: 2023-11-21 | Stop reason: ALTCHOICE

## 2023-11-21 RX ORDER — FENTANYL CITRATE 50 UG/ML
INJECTION, SOLUTION INTRAMUSCULAR; INTRAVENOUS PRN
Status: DISCONTINUED | OUTPATIENT
Start: 2023-11-21 | End: 2023-11-21 | Stop reason: SDUPTHER

## 2023-11-21 RX ORDER — BUPIVACAINE HYDROCHLORIDE 5 MG/ML
INJECTION, SOLUTION EPIDURAL; INTRACAUDAL PRN
Status: DISCONTINUED | OUTPATIENT
Start: 2023-11-21 | End: 2023-11-21 | Stop reason: SDUPTHER

## 2023-11-21 RX ORDER — OXYCODONE HYDROCHLORIDE 5 MG/1
10 TABLET ORAL PRN
Status: DISCONTINUED | OUTPATIENT
Start: 2023-11-21 | End: 2023-11-21 | Stop reason: HOSPADM

## 2023-11-21 RX ADMIN — Medication 100 MCG: at 13:32

## 2023-11-21 RX ADMIN — EPHEDRINE SULFATE 5 MG: 50 INJECTION INTRAVENOUS at 13:36

## 2023-11-21 RX ADMIN — DEXAMETHASONE SODIUM PHOSPHATE 10 MG: 10 INJECTION, SOLUTION INTRAMUSCULAR; INTRAVENOUS at 12:54

## 2023-11-21 RX ADMIN — PROPOFOL 200 MG: 10 INJECTION, EMULSION INTRAVENOUS at 13:04

## 2023-11-21 RX ADMIN — BUPIVACAINE HYDROCHLORIDE 75 ML: 5 INJECTION, SOLUTION EPIDURAL; INTRACAUDAL; PERINEURAL at 12:54

## 2023-11-21 RX ADMIN — FENTANYL CITRATE 100 MCG: 50 INJECTION, SOLUTION INTRAMUSCULAR; INTRAVENOUS at 13:20

## 2023-11-21 RX ADMIN — Medication 2000 MG: at 13:12

## 2023-11-21 RX ADMIN — KETAMINE HYDROCHLORIDE 20 MG: 50 INJECTION INTRAMUSCULAR; INTRAVENOUS at 13:09

## 2023-11-21 RX ADMIN — OXYCODONE 10 MG: 5 TABLET ORAL at 15:09

## 2023-11-21 RX ADMIN — KETAMINE HYDROCHLORIDE 10 MG: 50 INJECTION INTRAMUSCULAR; INTRAVENOUS at 12:36

## 2023-11-21 RX ADMIN — ONDANSETRON 4 MG: 2 INJECTION INTRAMUSCULAR; INTRAVENOUS at 13:29

## 2023-11-21 RX ADMIN — Medication 0.5 MG: at 14:40

## 2023-11-21 RX ADMIN — SODIUM CHLORIDE, POTASSIUM CHLORIDE, SODIUM LACTATE AND CALCIUM CHLORIDE: 600; 310; 30; 20 INJECTION, SOLUTION INTRAVENOUS at 12:34

## 2023-11-21 RX ADMIN — PROPOFOL 200 MG: 10 INJECTION, EMULSION INTRAVENOUS at 13:17

## 2023-11-21 RX ADMIN — GLYCOPYRROLATE 0.2 MG: 0.2 INJECTION, SOLUTION INTRAMUSCULAR; INTRAVENOUS at 13:18

## 2023-11-21 RX ADMIN — HYDROMORPHONE HYDROCHLORIDE 0.5 MG: 1 INJECTION, SOLUTION INTRAMUSCULAR; INTRAVENOUS; SUBCUTANEOUS at 14:40

## 2023-11-21 RX ADMIN — DEXMEDETOMIDINE HYDROCHLORIDE 20 MCG: 100 INJECTION, SOLUTION INTRAVENOUS at 13:16

## 2023-11-21 RX ADMIN — Medication 50 MCG: at 13:09

## 2023-11-21 RX ADMIN — MIDAZOLAM 2 MG: 1 INJECTION INTRAMUSCULAR; INTRAVENOUS at 12:35

## 2023-11-21 RX ADMIN — LIDOCAINE HYDROCHLORIDE 60 MG: 20 INJECTION, SOLUTION EPIDURAL; INFILTRATION; INTRACAUDAL; PERINEURAL at 13:04

## 2023-11-21 ASSESSMENT — PAIN DESCRIPTION - DESCRIPTORS: DESCRIPTORS: ACHING;BURNING

## 2023-11-21 ASSESSMENT — PAIN DESCRIPTION - LOCATION: LOCATION: KNEE

## 2023-11-21 ASSESSMENT — PAIN DESCRIPTION - ORIENTATION: ORIENTATION: RIGHT

## 2023-11-21 ASSESSMENT — PAIN SCALES - GENERAL
PAINLEVEL_OUTOF10: 7
PAINLEVEL_OUTOF10: 2

## 2023-11-21 NOTE — ANESTHESIA PRE PROCEDURE
Neuro/Psych:               GI/Hepatic/Renal:   (+) GERD: well controlled          Endo/Other:                     Abdominal:             Vascular:   + PVD, aortic or cerebral (Asc aortic aneurysm). Other Findings:           Anesthesia Plan      general and regional     ASA 3       Induction: intravenous. Anesthetic plan and risks discussed with patient. Plan discussed with CRNA.                   Lang Lennon MD   11/21/2023

## 2023-11-21 NOTE — ANESTHESIA PROCEDURE NOTES
Peripheral Block    Patient location during procedure: pre-op  Reason for block: post-op pain management and at surgeon's request  Start time: 11/21/2023 12:36 PM  End time: 11/21/2023 12:54 PM  Staffing  Performed: resident/CRNA   Resident/CRNA: LEONEL Salazar CRNA  Performed by: LEONEL Salazar CRNA  Authorized by: Olamide Garcia MD    Preanesthetic Checklist  Completed: patient identified, IV checked, site marked, risks and benefits discussed, surgical/procedural consents, equipment checked, pre-op evaluation, timeout performed, anesthesia consent given, oxygen available, monitors applied/VS acknowledged, fire risk safety assessment completed and verbalized and blood product R/B/A discussed and consented  Peripheral Block   Patient position: supine  Prep: ChloraPrep  Provider prep: mask, sterile gloves and sterile gown  Patient monitoring: cardiac monitor, IV access, oxygen, responsive to questions, frequent blood pressure checks, continuous capnometry and continuous pulse ox  Block type: Femoral  Adductor canal  Laterality: right  Injection technique: single-shot  Guidance: ultrasound guided  Local infiltration: lidocaine  Infiltration strength: 1 %  Local infiltration: lidocaine  Dose: 3 mL    Needle   Needle type: short-bevel   Needle gauge: 22 G  Needle localization: ultrasound guidance  Assessment   Injection assessment: negative aspiration for heme, no paresthesia on injection, local visualized surrounding nerve on ultrasound, no intravascular symptoms and low pressure verified by pressure monitor  Paresthesia pain: none  Slow fractionated injection: yes  Hemodynamics: stable  Real-time US image taken/store: yes  Outcomes: uncomplicated and patient tolerated procedure well

## 2023-11-21 NOTE — PROGRESS NOTES
Block Time Out with Erinn Moser CRNA      Verified   Correct Pt.   Correct   Correct Procedure  Correct Site  Correct Extremity

## 2023-11-21 NOTE — PROGRESS NOTES
Pt tolerated block without adverse events _ no ekg changes noted- CRna at bedside the entire block and post block -spo2 wnl- pt denies tinnitus

## 2023-11-21 NOTE — H&P
Dr Holman Sick      Date /Time 11/20/2023       9:14 AM EST  Name Elva Morales             1950   Location  [address] [facility] [address]  MRN [id number]                No chief complaint on file. History of Present Illness  Velasquez Franco is a 68 y.o. male who presented to my clinic 11/13 with an acute knee injury and was found to have a right knee quadriceps rupture. He was previously scheduled last week and was found to be in new onset A-fib. He was evaluated by cardiology who made recommendations and obtained an echocardiogram which returned within normal limits. He was therefore considered optimized for surgery today with plans to start anticoagulation on Thursday. Past History  Past Medical History:   Diagnosis Date    A-fib ([facility] [address] [facility]) 11/17/2023    AAA (abdominal aortic aneurysm) (HCC)     GERD (gastroesophageal reflux disease)     mild intermittent    Hypertension      Past Surgical History:   Procedure Laterality Date    COLONOSCOPY      JOINT REPLACEMENT      left knee    KNEE ARTHROSCOPY      one on right, two on left    REVISION TOTAL KNEE ARTHROPLASTY Left 6/4/2019    REVISION LEFT PATELLAR BUTTON performed by Miroslava Frias MD at [facility]     Social History     Tobacco Use    Smoking status: Never    Smokeless tobacco: Never   Substance Use Topics    Alcohol use: Yes     Alcohol/week: 7.0 standard drinks of alcohol     Types: 7 Glasses of wine per week     Comment: 1-2 glasses wine daily      No current facility-administered medications on file prior to encounter.      Current Outpatient Medications on File Prior to Encounter   Medication Sig Dispense Refill    apixaban (ELIQUIS) 5 MG TABS tablet Take 1 tablet by mouth 2 times daily 180 tablet 1    celecoxib (CELEBREX) 100 MG capsule Take 1 capsule by mouth 2 times daily (Patient not taking: Reported on 11/17/2023) 60 capsule 5    diclofenac (VOLTAREN) 50 MG EC tablet Take 1 tablet by

## 2023-11-21 NOTE — DISCHARGE INSTRUCTIONS
Weight bearing as tolerated in knee brace in full extension    Knee brace must be on in extension for 6 weeks except for physical therapy and hygeine    Recommend celebrex, muscle relaxor and tylenol on schedule, oxycodone pain medicine prescribed if needed    Follow up in 6 weeks with Dr. Clare Sever     Please remember while having a nerve block you are at an increased risk for [facility]!! You were given a nerve block today from the anesthesiologist. Most nerve blocks last anywhere from 6-36 hours. You should start taking your pain medication before the block wears off or when you first begin feeling discomfort. It takes at least 30-60 minutes for a pain pill to take effect. Pain medications should be taken with food. Consider setting an alarm through the night to help manage your pain level so you do not wake up with too much pain. Pain medicines can cause more sedation and decrease your breathing so ONLY take as directed. If you have Sleep Apnea, you definitely need to use your C-Pap machine. What to expect after a nerve block:   Numbness, tingling- arm or leg feels heavy or asleep  Weakness or inability to move or control your arm or leg   Inability to feel temperature changes to your arm or leg  Usually the weakness wears off first, followed by the tingling or heaviness. You may notice more pain at this point and should start taking your pain meds. If you had a shoulder block, you may experience:  Mild shortness of breath (may be relieved by sitting up in a chair or recliner)  Hoarse voice  Blurry vision  Unequal pupils  Drooping of your face (eye or lip) on the same side as the nerve block  Swelling at the injection site on the side of your neck  These side effects should resolve as the block wears off! IF you have severe or prolonged shortness of breath-  GO to the nearest Emergency Room!    If you had a nerve block of your arm or leg:  Protect the

## 2023-11-21 NOTE — ANESTHESIA POSTPROCEDURE EVALUATION
Department of Anesthesiology  Postprocedure Note    Patient: Angelita Dillon  MRN: [id number]  YOB: 1950  Date of evaluation: 11/21/2023      Procedure Summary       Date: 11/21/23 Room / Location: [address] / [address] [place] [address]    Anesthesia Start: 1257 Anesthesia Stop: 5427    Procedure: RIGHT QUADRICEPS TENDON REPAIR NOTE: ADDUCTOR CANAL BLOCK (Right: Leg Upper) Diagnosis:       Rupture of quadriceps tendon, right, initial encounter      (Rupture of quadriceps tendon, right, initial encounter [L53.749W])    Surgeons: Josefina Carpenter MD Responsible Provider: Madison Ramirez MD    Anesthesia Type: general, regional ASA Status: 3            Anesthesia Type: No value filed.     Verónica Phase I: Verónica Score: 9    Verónica Phase II:        Anesthesia Post Evaluation    Patient location during evaluation: PACU  Patient participation: complete - patient participated  Level of consciousness: awake and alert  Airway patency: patent  Nausea & Vomiting: no nausea and no vomiting  Complications: no  Cardiovascular status: hemodynamically stable  Respiratory status: acceptable  Hydration status: euvolemic  Pain management: adequate

## 2023-11-21 NOTE — PROGRESS NOTES
Juan J Chavez CRNA  performing Rt lower extremity nerve block with Susen Mcardle  Student crna with ultra sound guidance-   Pt on telemetry during block - Afib on monitor

## 2023-11-22 ENCOUNTER — TELEPHONE (OUTPATIENT)
Dept: ORTHOPEDIC SURGERY | Age: 73
End: 2023-11-22

## 2023-11-22 NOTE — TELEPHONE ENCOUNTER
ENA SAID HE THOUGHT DR CRUZ SAID HE HAD PT SCHEDULE FOR 2 WEEKS AFTER SURGERY .  I DID NOT SEE AN APPOINTMENT IN THERE FOR HIM . PLEASE CALL HIM AND LET HIM KNOW WHEN HE SHOULD START PT AND IF HE IS TO MAKE THE APPOINTMENT OR ARE YOU MAKING IT FOR HIM

## 2023-11-29 ENCOUNTER — NURSE ONLY (OUTPATIENT)
Dept: CARDIOLOGY CLINIC | Age: 73
End: 2023-11-29

## 2023-11-29 DIAGNOSIS — I48.91 NEW ONSET A-FIB (HCC): Primary | ICD-10-CM

## 2023-11-29 NOTE — PROGRESS NOTES
Monitor placed by Karlee Ayala VC  Length of monitor 2 weeks  Monitor ordered by [facility]  Serial number Ashwin Hopkins ID [id number]  Activation successful prior to pt leaving office?  Yes

## 2023-11-30 ENCOUNTER — TELEPHONE (OUTPATIENT)
Dept: CARDIOLOGY CLINIC | Age: 73
End: 2023-11-30

## 2023-11-30 NOTE — TELEPHONE ENCOUNTER
Spoke with patient and advised he is still reading on the vital connect website. Let patient know he could use the extra adhesive patch in the box to prevent any further unraveling or if it falls off he can come into the office for a new patch. Pt v/u.

## 2023-11-30 NOTE — TELEPHONE ENCOUNTER
Pt had monitor placed 11/29/23. Pt sts patch is not stuck 100% to him. The end is curling up by 15-20 % of the patch. It's not moving. Nothing is alarming or stating not connected, he just wants to make there is no issue with testing. Is there something he can do to help the whole patch stick. Please advise.

## 2023-12-05 ENCOUNTER — HOSPITAL ENCOUNTER (OUTPATIENT)
Dept: PHYSICAL THERAPY | Age: 73
Setting detail: THERAPIES SERIES
Discharge: HOME | End: 2023-12-05
Attending: STUDENT IN AN ORGANIZED HEALTH CARE EDUCATION/TRAINING PROGRAM
Payer: MEDICARE

## 2023-12-05 DIAGNOSIS — R26.2 DIFFICULTY WALKING: ICD-10-CM

## 2023-12-05 DIAGNOSIS — M25.561 RIGHT KNEE PAIN, UNSPECIFIED CHRONICITY: Primary | ICD-10-CM

## 2023-12-05 PROCEDURE — 97110 THERAPEUTIC EXERCISES: CPT | Performed by: PHYSICAL THERAPIST

## 2023-12-05 PROCEDURE — 97161 PT EVAL LOW COMPLEX 20 MIN: CPT | Performed by: PHYSICAL THERAPIST

## 2023-12-05 PROCEDURE — 97016 VASOPNEUMATIC DEVICE THERAPY: CPT | Performed by: PHYSICAL THERAPIST

## 2023-12-05 NOTE — FLOWSHEET NOTE
reducing/eliminating soft tissue swelling/inflammation/restriction, improving soft tissue extensibility and allowing for proper ROM for normal function with self care, mobility, lifting and ambulation  (22142) VASOPNEUMATIC    TREATMENT PLAN   Plan: POC Initiated today- see eval for details    Electronically Signed by Jayla Shine PT, DPT [id number]               Date: 12/05/2023     Note: If patient does not return for scheduled/recommended follow up visits, this note will serve as a discharge from care along with the most recent update on progress.

## 2023-12-07 ENCOUNTER — HOSPITAL ENCOUNTER (OUTPATIENT)
Dept: PHYSICAL THERAPY | Age: 73
Setting detail: THERAPIES SERIES
Discharge: HOME | End: 2023-12-07
Attending: STUDENT IN AN ORGANIZED HEALTH CARE EDUCATION/TRAINING PROGRAM
Payer: MEDICARE

## 2023-12-07 PROCEDURE — 97016 VASOPNEUMATIC DEVICE THERAPY: CPT | Performed by: PHYSICAL THERAPIST

## 2023-12-07 PROCEDURE — 97140 MANUAL THERAPY 1/> REGIONS: CPT | Performed by: PHYSICAL THERAPIST

## 2023-12-07 PROCEDURE — 97110 THERAPEUTIC EXERCISES: CPT | Performed by: PHYSICAL THERAPIST

## 2023-12-07 NOTE — FLOWSHEET NOTE
[address] and Therapy  [address] [facility] [address] [facility], [address] [place] [address] office: [phone] fax: [phone]      Physical Therapy: TREATMENT/PROGRESS NOTE   Patient: Angelita Dillon (72 y.o. male)   Treatment Date: 2023   :  1950 MRN: [id number]   Visit #: 2   Insurance Allowable Auth Needed   BMN []Yes    [x]No    Insurance: Payor: Liberty Carr / Plan: [address] [facility] / Product Type: *No Product type* /   Insurance ID: [id number] - (Medicare Managed)  Secondary Insurance (if applicable):    Treatment Diagnosis:     ICD-10-CM    1. Right knee pain, unspecified chronicity  M25.561       2. Difficulty walking  R26.2          Medical Diagnosis:    Nontraumatic rupture of right quadriceps tendon [P38.842]   Referring Physician: Josefina Carpenter MD  PCP: Mikaela Tabares [address] [place] [address] of care signed (Y/N):     Date of Patient follow up with Physician:      Progress Report/POC: NO  POC update due: (10 visits /OR [address], whichever is less)  2024       Preferred Language for Healthcare:   [x]English       []other:    SUBJECTIVE EXAMINATION     Patient Report/Comments: Patient reports that his knee is feeling a little better. Has been able to sleep a little better. Not taking any pain medication or Tylenol. OBJECTIVE EXAMINATION     Observation: Enters PT ambulating with bilateral crutches with T scope brace locked in ext WBAT. Test measurements:    Test used Initial score  2023   Pain Summary VAS 2-7 2/10   Functional questionnaire LEFS 24/80 (70%)     PROM  flexion  60 deg           Exercises/Interventions:   Restrictions/precautions:PROM only of R knee. No AROM. WBAT in TROM locked in full extension except for therapy and hygeine until 6 weeks after surgery.  Per MD's MA, limit PROM to 90 deg for 6 weeks; spoke with Dr. Emil Srinivasan MA who relayed that Dr. Mini Danielle does not want quad sets or SLR

## 2023-12-12 ENCOUNTER — HOSPITAL ENCOUNTER (OUTPATIENT)
Dept: PHYSICAL THERAPY | Age: 73
Setting detail: THERAPIES SERIES
Discharge: HOME | End: 2023-12-12
Attending: STUDENT IN AN ORGANIZED HEALTH CARE EDUCATION/TRAINING PROGRAM
Payer: MEDICARE

## 2023-12-12 PROCEDURE — 97140 MANUAL THERAPY 1/> REGIONS: CPT | Performed by: PHYSICAL THERAPIST

## 2023-12-12 PROCEDURE — 97110 THERAPEUTIC EXERCISES: CPT | Performed by: PHYSICAL THERAPIST

## 2023-12-12 PROCEDURE — 97016 VASOPNEUMATIC DEVICE THERAPY: CPT | Performed by: PHYSICAL THERAPIST

## 2023-12-12 NOTE — FLOWSHEET NOTE
[address] [facility] Therapy  [address] [place] [address], [address] [place] [address] office: [phone] fax: [phone]      Physical Therapy: TREATMENT/PROGRESS NOTE   Patient: Lauro Finney (27 y.o. male)   Treatment Date: 2023   :  1950 MRN: [id number]   Visit #: 3   Insurance Allowable Auth Needed   BMN []Yes    [x]No    Insurance: Payor: Mukesh Ribera / Plan: Leslie Taylor / Product Type: *No Product type* /   Insurance ID: [id number] - (Medicare Managed)  Secondary Insurance (if applicable):    Treatment Diagnosis:     ICD-10-CM    1. Right knee pain, unspecified chronicity  M25.561       2. Difficulty walking  R26.2          Medical Diagnosis:    Nontraumatic rupture of right quadriceps tendon [Z08.206] S/p quad tendon repair (Date of Surgery: 23)   Referring Physician: Germaine Ortega MD  PCP: Bubba Runner, [address] [place] [address] of [facility] signed (Y/N): Yes    Date of Patient follow up with Physician:      Progress Report/POC: NO  POC update due: (10 visits [address] [facility] [place], whichever is less)  2024       Preferred Language for Healthcare:   [x]English       []other:    SUBJECTIVE EXAMINATION     Patient Report/Comments: Patient reports that his knee does not really hurt much. Seems to give a little at times when he walks, even with crutches and the brace, and one time it was very painful. Otherwise, no issues. OBJECTIVE EXAMINATION     Observation: Enters PT ambulating with bilateral crutches with T scope brace locked in ext WBAT. Test measurements:    Test used Initial score  2023   Pain Summary VAS 2-7 -2/10   Functional questionnaire LEFS 24/ (70%)     PROM  flexion  68 deg           Exercises/Interventions: DOS 23  Restrictions/precautions:PROM only of R knee. No AROM. WBAT in TROM locked in full extension except for therapy and hygeine until 6 weeks (24) after surgery.  Per MD's

## 2023-12-14 ENCOUNTER — APPOINTMENT (OUTPATIENT)
Dept: PHYSICAL THERAPY | Age: 73
End: 2023-12-14
Attending: STUDENT IN AN ORGANIZED HEALTH CARE EDUCATION/TRAINING PROGRAM
Payer: MEDICARE

## 2023-12-17 PROBLEM — Z01.810 PREOPERATIVE CARDIOVASCULAR EXAMINATION: Status: RESOLVED | Noted: 2023-11-17 | Resolved: 2023-12-17

## 2023-12-19 ENCOUNTER — APPOINTMENT (OUTPATIENT)
Dept: PHYSICAL THERAPY | Age: 73
End: 2023-12-19
Attending: STUDENT IN AN ORGANIZED HEALTH CARE EDUCATION/TRAINING PROGRAM
Payer: MEDICARE

## 2023-12-21 ENCOUNTER — APPOINTMENT (OUTPATIENT)
Dept: PHYSICAL THERAPY | Age: 73
End: 2023-12-21
Attending: STUDENT IN AN ORGANIZED HEALTH CARE EDUCATION/TRAINING PROGRAM
Payer: MEDICARE

## 2023-12-25 PROCEDURE — 93228 REMOTE 30 DAY ECG REV/REPORT: CPT | Performed by: INTERNAL MEDICINE

## 2023-12-26 ENCOUNTER — HOSPITAL ENCOUNTER (OUTPATIENT)
Dept: PHYSICAL THERAPY | Age: 73
Setting detail: THERAPIES SERIES
Discharge: HOME | End: 2023-12-26
Attending: STUDENT IN AN ORGANIZED HEALTH CARE EDUCATION/TRAINING PROGRAM
Payer: MEDICARE

## 2023-12-26 PROCEDURE — 97140 MANUAL THERAPY 1/> REGIONS: CPT

## 2023-12-26 PROCEDURE — 97110 THERAPEUTIC EXERCISES: CPT

## 2023-12-26 PROCEDURE — 97016 VASOPNEUMATIC DEVICE THERAPY: CPT

## 2023-12-26 NOTE — FLOWSHEET NOTE
[address] and Therapy  [address], [address] [place] [address] office: [phone] fax: [phone]      Physical Therapy: TREATMENT/PROGRESS NOTE   Patient: Olga Lidia Graves (56 y.o. male)   Treatment Date: 2023   :  1950 MRN: [id number]   Visit #: 4   Insurance Allowable Auth Needed   BMN []Yes    [x]No    Insurance: Payor: Ryan Rose / Plan: Nohemi Bates / Product Type: *No Product type* /   Insurance ID: [id number] - (Medicare Managed)  Secondary Insurance (if applicable):    Treatment Diagnosis:     ICD-10-CM    1. Right knee pain, unspecified chronicity  M25.561       2. Difficulty walking  R26.2          Medical Diagnosis:    Nontraumatic rupture of right quadriceps tendon [M78.167] S/p quad tendon repair (Date of Surgery: 23)   Referring Physician: Dwayne Rand MD  PCP: Sheila Dobson [address] [place] [address] signed (Y/N): Yes    Date of Patient follow up with Physician:      Progress Report/POC: NO  POC update due: (10 visits [address] [facility], whichever is less)  2024       Preferred Language for Healthcare:   [x]English       []other:    SUBJECTIVE EXAMINATION     Patient Report/Comments: Patient reports that his knee does not really hurt much. Seems to give a little at times when he walks, even with crutches and the brace, and one time it was very painful. Otherwise, no issues. OBJECTIVE EXAMINATION     Observation: Enters PT ambulating with bilateral crutches with T scope brace locked in ext WBAT. Test measurements:    Test used Initial score  2023   Pain Summary VAS 2-7 -2/10 1-2/10   Functional questionnaire LEFS 24/80 (70%)      PROM  flexion  68 deg 80 deg            Exercises/Interventions: DOS 23  Restrictions/precautions:PROM only of R knee. No AROM.  WBAT in TROM locked in full extension except for therapy and hygeine until 6 weeks (24)

## 2023-12-28 ENCOUNTER — APPOINTMENT (OUTPATIENT)
Dept: PHYSICAL THERAPY | Age: 73
End: 2023-12-28
Attending: STUDENT IN AN ORGANIZED HEALTH CARE EDUCATION/TRAINING PROGRAM
Payer: MEDICARE

## 2023-12-29 ENCOUNTER — HOSPITAL ENCOUNTER (OUTPATIENT)
Dept: PHYSICAL THERAPY | Age: 73
Setting detail: THERAPIES SERIES
Discharge: HOME | End: 2023-12-29
Attending: STUDENT IN AN ORGANIZED HEALTH CARE EDUCATION/TRAINING PROGRAM
Payer: MEDICARE

## 2023-12-29 PROCEDURE — 97110 THERAPEUTIC EXERCISES: CPT

## 2023-12-29 PROCEDURE — 97140 MANUAL THERAPY 1/> REGIONS: CPT

## 2023-12-29 PROCEDURE — 97016 VASOPNEUMATIC DEVICE THERAPY: CPT

## 2023-12-29 NOTE — FLOWSHEET NOTE
Adjusted  Patient will be able to ambulate for 15 minutes with no AD with normal gait pattern in order to be able to return to community ambulation without increased symptoms or restrictions. Status: [] Progressing: [] Met: [] Not Met: [] Adjusted    Overall Progression Towards Functional goals/ Treatment Progress Update:  [] Patient is progressing as expected towards functional goals listed. [] Progression is slowed due to complexities/Impairments listed. [] Progression has been slowed due to co-morbidities. [x] Plan just implemented, too soon (<30days) to assess goals progression   [] Goals require adjustment due to lack of progress  [] Patient is not progressing as expected and requires additional follow up with physician  [] Other:     CHARGE CAPTURE     PT CHARGE GRID   CPT Code (TIMED) minutes # CPT Code (UNTIMED) #     Therex [phone])  23' 1  EVAL:LOW (71344 - Typically 20 minutes face-to-face)     Neuromusc. Re-ed (55481)    Re-Eval (48563)     Manual (86907) 12' 1  Estim Unattended (13918)     Ther. Act (33646)    [facility] ([id number])     Gait (62435)    Dry Needle 1-2 muscle (58349)     Aquatic Therex (00861)    Dry Needle 3+ muscle (12629)     Iontophoresis (17977)    VASO (62330) 1    Ultrasound (57469)    Group Therapy (50226)     Estim Attended (22949)    Canalith Repositioning (96437)     Other:    Other: Total Timed Code Tx Minutes 35' 2       Total Treatment Minutes 54' (education)        Charge Justification:  (24277) THERAPEUTIC EXERCISE - Provided verbal/tactile cueing for activities related to strengthening, flexibility, endurance, ROM performed to prevent loss of range of motion, maintain or improve muscular strength or increase flexibility, following either an injury or surgery.    (42921) [facility] - Reviewed/Progressed HEP activities related to strengthening,

## 2024-01-02 ENCOUNTER — TELEPHONE (OUTPATIENT)
Dept: CARDIOLOGY CLINIC | Age: 74
End: 2024-01-02

## 2024-01-02 ENCOUNTER — HOSPITAL ENCOUNTER (OUTPATIENT)
Dept: PHYSICAL THERAPY | Age: 74
Setting detail: THERAPIES SERIES
Discharge: HOME | End: 2024-01-02
Attending: STUDENT IN AN ORGANIZED HEALTH CARE EDUCATION/TRAINING PROGRAM
Payer: MEDICARE

## 2024-01-02 DIAGNOSIS — I48.91 NEW ONSET A-FIB (HCC): Primary | ICD-10-CM

## 2024-01-02 PROCEDURE — 97016 VASOPNEUMATIC DEVICE THERAPY: CPT | Performed by: PHYSICAL THERAPIST

## 2024-01-02 PROCEDURE — 97110 THERAPEUTIC EXERCISES: CPT | Performed by: PHYSICAL THERAPIST

## 2024-01-02 PROCEDURE — 97140 MANUAL THERAPY 1/> REGIONS: CPT | Performed by: PHYSICAL THERAPIST

## 2024-01-02 NOTE — PLAN OF CARE
[facility]- Outpatient Rehabilitation and Therapy  [address], [place] [address] office: [phone] fax: [phone]      Physical Therapy: TREATMENT/PROGRESS NOTE   Patient: Jovanny Morales (73 y.o. male)   Treatment Date: 2024   :  1950 MRN: [id number]   Visit #: 6   Insurance Allowable Auth Needed   BMN []Yes    [x]No    Insurance: Payor: [facility] MEDICARE / Plan: [facility] MEDICARE ADVANTAGE / Product Type: *No Product type* /   Insurance ID: [id number] - (Medicare Managed)  Secondary Insurance (if applicable):    Treatment Diagnosis:     ICD-10-CM    1. Right knee pain, unspecified chronicity  M25.561       2. Difficulty walking  R26.2          Medical Diagnosis:    Nontraumatic rupture of right quadriceps tendon [M66.251] S/p quad tendon repair (Date of Surgery: 23)   Referring Physician: Mayur Toledo MD  PCP: Ortiz Sanders                             Plan of care signed (Y/N): Yes    Date of Patient follow up with Physician:      Progress Report/POC: YES and Date Range for this report: 23 - 24  POC update due: (10 visits /OR AUTH LIMITS, whichever is less)  24       Preferred Language for Healthcare:   [x]English       []other:    SUBJECTIVE EXAMINATION     Patient Report/Comments: Patient reports that he has minimal pain with walking. Was sore with bending at last visit.       OBJECTIVE EXAMINATION     Observation: Enters PT ambulating with bilateral crutches with T scope brace locked in ext WBAT.     Test measurements:    Test used Initial score  2023   Pain Summary VAS 2-7 -2/10 1-2/10   Functional questionnaire LEFS 24/80 (70%)      PROM  flexion  68 deg 100    ext  -1 0   Quad tone   poor fair     Exercises/Interventions: DOS 23  Restrictions/precautions: per MA: OK to unlock brace and wean out to WBAT without brace as long as not buckling, start AROM and PROM, light strengthening (No lifts > 50 lbs)

## 2024-01-04 ENCOUNTER — HOSPITAL ENCOUNTER (OUTPATIENT)
Dept: PHYSICAL THERAPY | Age: 74
Setting detail: THERAPIES SERIES
Discharge: HOME | End: 2024-01-04
Attending: STUDENT IN AN ORGANIZED HEALTH CARE EDUCATION/TRAINING PROGRAM
Payer: MEDICARE

## 2024-01-04 PROCEDURE — 97016 VASOPNEUMATIC DEVICE THERAPY: CPT

## 2024-01-04 PROCEDURE — 97140 MANUAL THERAPY 1/> REGIONS: CPT

## 2024-01-04 PROCEDURE — 97110 THERAPEUTIC EXERCISES: CPT

## 2024-01-04 NOTE — FLOWSHEET NOTE
[facility]- Outpatient Rehabilitation and Therapy  [address], [place] [address] office: [phone] fax: [phone]      Physical Therapy: TREATMENT/PROGRESS NOTE   Patient: Jovanny Morales (73 y.o. male)   Treatment Date: 2024   :  1950 MRN: [id number]   Visit #: 7   Insurance Allowable Auth Needed   BMN []Yes    [x]No    Insurance: Payor: [facility] MEDICARE / Plan: [facility] MEDICARE ADVANTAGE / Product Type: *No Product type* /   Insurance ID: [id number] - (Medicare Managed)  Secondary Insurance (if applicable):    Treatment Diagnosis:     ICD-10-CM    1. Right knee pain, unspecified chronicity  M25.561       2. Difficulty walking  R26.2          Medical Diagnosis:    Nontraumatic rupture of right quadriceps tendon [M66.251] S/p quad tendon repair (Date of Surgery: 23)   Referring Physician: Mayur Toledo MD  PCP: Ortiz Sanders                             Plan of care signed (Y/N): Yes    Date of Patient follow up with Physician:      Progress Report/POC: NO  POC update due: (10 visits /OR AUTH LIMITS, whichever is less)  24       Preferred Language for Healthcare:   [x]English       []other:    SUBJECTIVE EXAMINATION     Patient Report/Comments: Patient reports that he has minimal pain with walking and besides a little soreness after last session, feels good.      OBJECTIVE EXAMINATION     Observation: Enters PT ambulating with bilateral crutches with T scope brace     Test measurements:    Test used Initial score  2023   Pain Summary VAS 2-7 1-2/10 1-2/10   Functional questionnaire LEFS  (70%)      PROM  flexion  68 deg 100    ext  -1 0   Quad tone   poor fair     Exercises/Interventions: DOS 23  Restrictions/precautions: per MA: OK to unlock brace and wean out to WBAT without brace as long as not buckling, start AROM and PROM, light strengthening (No lifts > 50 lbs)     Therapeutic Ex (97349)  resistance Sets/time

## 2024-01-09 ENCOUNTER — HOSPITAL ENCOUNTER (OUTPATIENT)
Dept: PHYSICAL THERAPY | Age: 74
Setting detail: THERAPIES SERIES
Discharge: HOME | End: 2024-01-09
Attending: STUDENT IN AN ORGANIZED HEALTH CARE EDUCATION/TRAINING PROGRAM
Payer: MEDICARE

## 2024-01-09 PROCEDURE — 97110 THERAPEUTIC EXERCISES: CPT | Performed by: PHYSICAL THERAPIST

## 2024-01-09 PROCEDURE — 97140 MANUAL THERAPY 1/> REGIONS: CPT | Performed by: PHYSICAL THERAPIST

## 2024-01-09 PROCEDURE — 97016 VASOPNEUMATIC DEVICE THERAPY: CPT | Performed by: PHYSICAL THERAPIST

## 2024-01-09 NOTE — FLOWSHEET NOTE
[facility]- Outpatient Rehabilitation and Therapy  [address], [place] [address] office: [phone] fax: [phone]      Physical Therapy: TREATMENT/PROGRESS NOTE   Patient: Jovanny Morales (73 y.o. male)   Treatment Date: 2024   :  1950 MRN: [id number]   Visit #: 8   Insurance Allowable Auth Needed   BMN []Yes    [x]No    Insurance: Payor: [facility] MEDICARE / Plan: [facility] MEDICARE ADVANTAGE / Product Type: *No Product type* /   Insurance ID: [id number] - (Medicare Managed)  Secondary Insurance (if applicable):    Treatment Diagnosis:     ICD-10-CM    1. Right knee pain, unspecified chronicity  M25.561       2. Difficulty walking  R26.2          Medical Diagnosis:    Nontraumatic rupture of right quadriceps tendon [M66.251] S/p quad tendon repair (Date of Surgery: 23)   Referring Physician: Mayur Toledo MD  PCP: Ortiz Sanders                             Plan of care signed (Y/N): Yes    Date of Patient follow up with Physician:      Progress Report/POC: NO  POC update due: (10 visits /OR AUTH LIMITS, whichever is less)  24       Preferred Language for Healthcare:   [x]English       []other:    SUBJECTIVE EXAMINATION     Patient Report/Comments: Patient reports that he sometimes does not use a crutch for short distances at home. Uses 2 when he is out of the house for stability.       OBJECTIVE EXAMINATION     Observation: Enters PT ambulating with bilateral crutches with T scope brace     Test measurements: LEFS NV   Test used Initial score  2023   Pain Summary VAS 2-7 1-210 0-210   Functional questionnaire LEFS  (70%)      PROM  flexion  68 deg 115    ext  -1 0   Quad tone   poor fair     Exercises/Interventions: DOS 23  Restrictions/precautions: per MA: OK to unlock brace and wean out to WBAT without brace as long as not buckling, start AROM and PROM, light strengthening (No lifts > 50 lbs)     Therapeutic Ex

## 2024-01-11 ENCOUNTER — HOSPITAL ENCOUNTER (OUTPATIENT)
Dept: PHYSICAL THERAPY | Age: 74
Setting detail: THERAPIES SERIES
Discharge: HOME | End: 2024-01-11
Attending: STUDENT IN AN ORGANIZED HEALTH CARE EDUCATION/TRAINING PROGRAM
Payer: MEDICARE

## 2024-01-11 PROCEDURE — 97140 MANUAL THERAPY 1/> REGIONS: CPT | Performed by: PHYSICAL THERAPIST

## 2024-01-11 PROCEDURE — 97110 THERAPEUTIC EXERCISES: CPT | Performed by: PHYSICAL THERAPIST

## 2024-01-11 PROCEDURE — G0283 ELEC STIM OTHER THAN WOUND: HCPCS | Performed by: PHYSICAL THERAPIST

## 2024-01-11 PROCEDURE — 97016 VASOPNEUMATIC DEVICE THERAPY: CPT | Performed by: PHYSICAL THERAPIST

## 2024-01-11 NOTE — FLOWSHEET NOTE
[facility]- Outpatient Rehabilitation and Therapy  [address], [place] [address] office: [phone] fax: [phone]      Physical Therapy: TREATMENT/PROGRESS NOTE   Patient: Jovanny Morales (73 y.o. male)   Treatment Date: 2024   :  1950 MRN: [id number]   Visit #: 9   Insurance Allowable Auth Needed   BMN []Yes    [x]No    Insurance: Payor: [facility] MEDICARE / Plan: [facility] MEDICARE ADVANTAGE / Product Type: *No Product type* /   Insurance ID: [id number] - (Medicare Managed)  Secondary Insurance (if applicable):    Treatment Diagnosis:     ICD-10-CM    1. Right knee pain, unspecified chronicity  M25.561       2. Difficulty walking  R26.2          Medical Diagnosis:    Nontraumatic rupture of right quadriceps tendon [M66.251] S/p quad tendon repair (Date of Surgery: 23)   Referring Physician: Mauyr Toledo MD  PCP: Ortiz Sanders                             Plan of care signed (Y/N): Yes    Date of Patient follow up with Physician:      Progress Report/POC: NO  POC update due: (10 visits /OR AUTH LIMITS, whichever is less)  24       Preferred Language for Healthcare:   [x]English       []other:    SUBJECTIVE EXAMINATION     Patient Report/Comments: Patient reports that he is getting more confident with walking with less support on the crutches. Carries them around because mentally it seems to help. Having some pain and aching across his kneecap.      OBJECTIVE EXAMINATION     Observation: Enters PT ambulating with bilateral crutches with T scope brace. Not using crutches much to offweight RLE    Test measurements:    Test used Initial score  2023   Pain Summary VAS 2-7 1-210 0-210   Functional questionnaire LEFS 2480 (70%)   34/80 (58%)   AAROM  flexion  68 deg 119 SB rolls    ext  -1 0   Quad tone   poor fair     Exercises/Interventions: DOS 23  Restrictions/precautions: per MA: OK to unlock brace and wean out to WBAT

## 2024-01-15 ENCOUNTER — OFFICE VISIT (OUTPATIENT)
Dept: CARDIOLOGY CLINIC | Age: 74
End: 2024-01-15
Payer: MEDICARE

## 2024-01-15 ENCOUNTER — TELEPHONE (OUTPATIENT)
Dept: CARDIOLOGY CLINIC | Age: 74
End: 2024-01-15

## 2024-01-15 VITALS
DIASTOLIC BLOOD PRESSURE: 68 MMHG | HEART RATE: 73 BPM | HEIGHT: 73 IN | BODY MASS INDEX: 32.13 KG/M2 | WEIGHT: 242.4 LBS | SYSTOLIC BLOOD PRESSURE: 114 MMHG | OXYGEN SATURATION: 99 %

## 2024-01-15 DIAGNOSIS — E66.9 OBESITY (BMI 30.0-34.9): ICD-10-CM

## 2024-01-15 DIAGNOSIS — I48.91 NEW ONSET A-FIB (HCC): ICD-10-CM

## 2024-01-15 DIAGNOSIS — I10 ESSENTIAL (PRIMARY) HYPERTENSION: ICD-10-CM

## 2024-01-15 DIAGNOSIS — G47.33 OSA (OBSTRUCTIVE SLEEP APNEA): ICD-10-CM

## 2024-01-15 DIAGNOSIS — I48.19 PERSISTENT ATRIAL FIBRILLATION (HCC): Primary | ICD-10-CM

## 2024-01-15 PROCEDURE — 99214 OFFICE O/P EST MOD 30 MIN: CPT | Performed by: INTERNAL MEDICINE

## 2024-01-15 PROCEDURE — 3078F DIAST BP <80 MM HG: CPT | Performed by: INTERNAL MEDICINE

## 2024-01-15 PROCEDURE — 1123F ACP DISCUSS/DSCN MKR DOCD: CPT | Performed by: INTERNAL MEDICINE

## 2024-01-15 PROCEDURE — 93000 ELECTROCARDIOGRAM COMPLETE: CPT | Performed by: INTERNAL MEDICINE

## 2024-01-15 PROCEDURE — 3074F SYST BP LT 130 MM HG: CPT | Performed by: INTERNAL MEDICINE

## 2024-01-15 ASSESSMENT — ENCOUNTER SYMPTOMS
SHORTNESS OF BREATH: 0
HEMATOCHEZIA: 0
LEFT EYE: 0
STRIDOR: 0
WHEEZING: 0
RIGHT EYE: 0
HEMATEMESIS: 0

## 2024-01-15 NOTE — PATIENT INSTRUCTIONS
Plan:     The current medical regimen is effective;  continue present plan and medications.  Follow up with me in three months.

## 2024-01-15 NOTE — TELEPHONE ENCOUNTER
Pt would like to know how much alcohol, if any, he can have. He is wanting to know if a glass of wine or a pour of bourbon after dinner is ok? Please advise and pt can be reached at [phone].

## 2024-01-15 NOTE — PROGRESS NOTES
Assessment:     1. Atrial fibrillation: patient had first documented episode of atrial fibrillation (November 2023).     Associated symptoms: None      History of cardioversion: No prior history of cardioversion  History of AF ablation: No history of atrial fibrillation ablation in the past  History of heart surgery/procedure: no  History of other cardiac arrhythmias: no     Current use of anti-arrhythmic drugs: Not currently on anti-arrhythmic drugs  Previous other use of anti-arrhythmic drugs: Not previously on any anti-arrhythmic drugs     Overall LV function: Normal left ventricular systolic function  Size of left atrium: Normal LA size  Significant cardiac valvular disease: No significant valve disease     Family history of atrial fibrillation: No     Alcohol consumption: Moderate alcohol consumption  Caffeine consumption: No/minimal caffeine intake  Smoking status: non-smoker  Obstructive sleep apnea: On CPAP/BiPAP therapy  Exercise status: Mild exercise routine     I had a discussion with the patient about issues related to atrial fibrillation (including etiology, disease progression patterns, stroke risk and rate control issues). Patient had his questions answered to his satisfaction.       Patient has a [id number] score of 2 [Age over 65 (1 point) and Hypertension (1 point)]  Longterm anticoagulation is: recommended  Current anticoagulation: None  Bleeding issues reported: no  Renal function: Normal renal function  Thyroid function:  no TSH on file     Patient with new finding of atrial fibrillation as he was being prepared for orthopedic surgery.  He did not feel any palpitations. It was felt likely that his arrhythmia had been ongoing for some time with good rate control on beta-blocker therapy.  No evidence of volume overload.  Hemodynamically stable.  Recently diagnosed with sleep apnea which is likely responsible, at least in part, for predisposing him to atrial fibrillation. Good compliance with

## 2024-01-15 NOTE — TELEPHONE ENCOUNTER
Patrick Carbajal MD  [facility] Ep16 minutes ago (1:04 PM)       I would minimize alcohol as much as he can do. Instead of nightly amounts of what he asked about, maybe just 3 nights per week.   I spoke with the patient and relayed message. He V/U.

## 2024-01-16 ENCOUNTER — HOSPITAL ENCOUNTER (OUTPATIENT)
Dept: PHYSICAL THERAPY | Age: 74
Setting detail: THERAPIES SERIES
Discharge: HOME | End: 2024-01-16
Attending: STUDENT IN AN ORGANIZED HEALTH CARE EDUCATION/TRAINING PROGRAM
Payer: MEDICARE

## 2024-01-16 PROCEDURE — 97110 THERAPEUTIC EXERCISES: CPT | Performed by: PHYSICAL THERAPIST

## 2024-01-16 PROCEDURE — 97140 MANUAL THERAPY 1/> REGIONS: CPT | Performed by: PHYSICAL THERAPIST

## 2024-01-16 PROCEDURE — 97016 VASOPNEUMATIC DEVICE THERAPY: CPT | Performed by: PHYSICAL THERAPIST

## 2024-01-16 NOTE — FLOWSHEET NOTE
[facility]- Outpatient Rehabilitation and Therapy  [address], [place] [address] office: [phone] fax: [phone]      Physical Therapy: TREATMENT/PROGRESS NOTE   Patient: Jovanny Morales (73 y.o. male)   Treatment Date: 2024   :  1950 MRN: [id number]   Visit #: 10   Insurance Allowable Auth Needed   BMN []Yes    [x]No    Insurance: Payor: [facility] MEDICARE / Plan: [facility] MEDICARE ADVANTAGE / Product Type: *No Product type* /   Insurance ID: [id number] - (Medicare Managed)  Secondary Insurance (if applicable):    Treatment Diagnosis:     ICD-10-CM    1. Right knee pain, unspecified chronicity  M25.561       2. Difficulty walking  R26.2          Medical Diagnosis:    Nontraumatic rupture of right quadriceps tendon [M66.251] S/p quad tendon repair (Date of Surgery: 23)   Referring Physician: Mayur Toledo MD  PCP: Ortiz Sanders                             Plan of care signed (Y/N): Yes    Date of Patient follow up with Physician:      Progress Report/POC: NO  POC update due: (10 visits /OR AUTH LIMITS, whichever is less)  24       Preferred Language for Healthcare:   [x]English       []other:    SUBJECTIVE EXAMINATION     Patient Report/Comments: Patient reports that he can see a difference from last week to today in terms of stability. Does not use crutches much at home.       OBJECTIVE EXAMINATION     Observation: Enters PT ambulating with bilateral crutches with T scope brace. Not using crutches much to offweight RLE    Test measurements:    Test used Initial score  2023   Pain Summary VAS 2-7 1-210 0-210   Functional questionnaire LEFS 24 (70%)   34/80 (58%)   ROM  flexion  68 deg 126 PROM    ext  -1 0   Quad tone   poor fair     Exercises/Interventions: DOS 23  Restrictions/precautions: per MA: OK to unlock brace and wean out to WBAT without brace as long as not buckling, start AROM and PROM, light

## 2024-01-18 ENCOUNTER — HOSPITAL ENCOUNTER (OUTPATIENT)
Dept: PHYSICAL THERAPY | Age: 74
Setting detail: THERAPIES SERIES
Discharge: HOME | End: 2024-01-18
Attending: STUDENT IN AN ORGANIZED HEALTH CARE EDUCATION/TRAINING PROGRAM
Payer: MEDICARE

## 2024-01-18 PROCEDURE — 97140 MANUAL THERAPY 1/> REGIONS: CPT | Performed by: PHYSICAL THERAPIST

## 2024-01-18 PROCEDURE — 97110 THERAPEUTIC EXERCISES: CPT | Performed by: PHYSICAL THERAPIST

## 2024-01-18 PROCEDURE — 97016 VASOPNEUMATIC DEVICE THERAPY: CPT | Performed by: PHYSICAL THERAPIST

## 2024-01-18 NOTE — FLOWSHEET NOTE
[facility]- Outpatient Rehabilitation and Therapy  [address], [place] [address] office: [phone] fax: [phone]      Physical Therapy: TREATMENT/PROGRESS NOTE   Patient: Jovanny Morales (73 y.o. male)   Treatment Date: 2024   :  1950 MRN: [id number]   Visit #: 11   Insurance Allowable Auth Needed   BMN []Yes    [x]No    Insurance: Payor: [facility] MEDICARE / Plan: [facility] MEDICARE ADVANTAGE / Product Type: *No Product type* /   Insurance ID: [id number] - (Medicare Managed)  Secondary Insurance (if applicable):    Treatment Diagnosis:     ICD-10-CM    1. Right knee pain, unspecified chronicity  M25.561       2. Difficulty walking  R26.2          Medical Diagnosis:    Nontraumatic rupture of right quadriceps tendon [M66.251] S/p quad tendon repair (Date of Surgery: 23)   Referring Physician: Mayur Toledo MD  PCP: Ortiz Sanders                             Plan of care signed (Y/N): Yes    Date of Patient follow up with Physician:      Progress Report/POC: NO  POC update due: (10 visits /OR AUTH LIMITS, whichever is less)  24       Preferred Language for Healthcare:   [x]English       []other:    SUBJECTIVE EXAMINATION     Patient Report/Comments: Patient reports that he can see a difference from last week to today in terms of stability. Does not use crutches much at home.       OBJECTIVE EXAMINATION     Observation: Enters PT ambulating with bilateral crutches with T scope brace. Not using crutches much to offweight RLE    Test measurements:    Test used Initial score  2023   Pain Summary VAS 2-7 1-210 0-210   Functional questionnaire LEFS 24 (70%)   34/80 (58%)   ROM  flexion  68 deg 126 PROM    ext  -1 0   Quad tone   poor fair     Exercises/Interventions: DOS 23  Restrictions/precautions: per MA: OK to unlock brace and wean out to WBAT without brace as long as not buckling, start AROM and PROM, light

## 2024-01-23 ENCOUNTER — HOSPITAL ENCOUNTER (OUTPATIENT)
Dept: PHYSICAL THERAPY | Age: 74
Setting detail: THERAPIES SERIES
Discharge: HOME | End: 2024-01-23
Attending: STUDENT IN AN ORGANIZED HEALTH CARE EDUCATION/TRAINING PROGRAM
Payer: MEDICARE

## 2024-01-23 PROCEDURE — 97140 MANUAL THERAPY 1/> REGIONS: CPT | Performed by: PHYSICAL THERAPIST

## 2024-01-23 PROCEDURE — 97016 VASOPNEUMATIC DEVICE THERAPY: CPT | Performed by: PHYSICAL THERAPIST

## 2024-01-23 PROCEDURE — 97110 THERAPEUTIC EXERCISES: CPT | Performed by: PHYSICAL THERAPIST

## 2024-01-23 NOTE — FLOWSHEET NOTE
[facility]- Outpatient Rehabilitation and Therapy  [address], [place] [address] office: [phone] fax: [phone]      Physical Therapy: TREATMENT/PROGRESS NOTE   Patient: Jovanny Morales (73 y.o. male)   Treatment Date: 2024   :  1950 MRN: [id number]   Visit #: 12   Insurance Allowable Auth Needed   BMN []Yes    [x]No    Insurance: Payor: [facility] MEDICARE / Plan: [facility] MEDICARE ADVANTAGE / Product Type: *No Product type* /   Insurance ID: [id number] - (Medicare Managed)  Secondary Insurance (if applicable):    Treatment Diagnosis:     ICD-10-CM    1. Right knee pain, unspecified chronicity  M25.561       2. Difficulty walking  R26.2          Medical Diagnosis:    Nontraumatic rupture of right quadriceps tendon [M66.251] S/p quad tendon repair (Date of Surgery: 23)   Referring Physician: Mayur Toledo MD  PCP: Ortiz Sanders                             Plan of care signed (Y/N): Yes    Date of Patient follow up with Physician:      Progress Report/POC: NO  POC update due: (10 visits /OR AUTH LIMITS, whichever is less)  24       Preferred Language for Healthcare:   [x]English       []other:    SUBJECTIVE EXAMINATION     Patient Report/Comments: Patient reports that he is doing well. Feels more confident with one crutch. Still scared to walk too much without it because his knee still feels unstable at times.       OBJECTIVE EXAMINATION     Observation: Enters PT ambulating with bilateral crutches with T scope brace. Not using crutches much to offweight RLE    Test measurements:    Test used Initial score  2023   Pain Summary VAS 2-7 1-210 0-210   Functional questionnaire LEFS  (70%)   34/80 (58%)   ROM  flexion  68 deg 126 PROM    ext  -1 0   Quad tone   poor fair     Exercises/Interventions: DOS 23  Restrictions/precautions: per MA: OK to unlock brace and wean out to WBAT without brace as long as not buckling,

## 2024-01-25 ENCOUNTER — HOSPITAL ENCOUNTER (OUTPATIENT)
Dept: PHYSICAL THERAPY | Age: 74
Setting detail: THERAPIES SERIES
Discharge: HOME | End: 2024-01-25
Attending: STUDENT IN AN ORGANIZED HEALTH CARE EDUCATION/TRAINING PROGRAM
Payer: MEDICARE

## 2024-01-25 PROCEDURE — 97110 THERAPEUTIC EXERCISES: CPT

## 2024-01-25 PROCEDURE — 97140 MANUAL THERAPY 1/> REGIONS: CPT

## 2024-01-25 PROCEDURE — 97016 VASOPNEUMATIC DEVICE THERAPY: CPT

## 2024-01-25 NOTE — FLOWSHEET NOTE
flexibility, following either an injury or surgery.   (67071) HOME EXERCISE PROGRAM - Reviewed/Progressed HEP activities related to strengthening, flexibility, endurance, ROM performed to prevent loss of range of motion, maintain or improve muscular strength or increase flexibility, following either an injury or surgery.  (54133) NEUROMUSCULAR RE-EDUCATION - Therapeutic procedure, 1 or more areas, each 15 minutes; neuromuscular reeducation of movement, balance, coordination, kinesthetic sense, posture, and/or proprioception for sitting and/or standing activities  (66709) HOME EXERCISE PROGRAM - Reviewed/Progressed HEP activities related to neuromuscular reeducation of movement, balance, coordination, kinesthetic sense, posture, and/or proprioception for sitting and/or standing activities    (12022) MANUAL THERAPY -  Manual therapy techniques, 1 or more regions, each 15 minutes (Mobilization/manipulation, manual lymphatic drainage, manual traction) for the purpose of modulating pain, promoting relaxation,  increasing ROM, reducing/eliminating soft tissue swelling/inflammation/restriction, improving soft tissue extensibility and allowing for proper ROM for normal function with self care, mobility, lifting and ambulation  (98134) VASOPNEUMATIC    TREATMENT PLAN   Plan: Cont POC- Continue emphasis/focus on exercise progression, improving proper muscle recruitment and activation/motor control patterns, increasing ROM, improving soft tissue extensibility, and allowing for proper ROM. Next visit plan to progress reps, add new exercises, and adjust HEP  2x/wk for 6 more weeks from 1/2/24    Electronically Signed by Ami Garcia PT, DPT [id number]               Date: 01/25/2024     Note: If patient does not return for scheduled/recommended follow up visits, this note will serve as a discharge from care along with the most recent update on progress.

## 2024-01-26 ENCOUNTER — OFFICE VISIT (OUTPATIENT)
Dept: ORTHOPEDIC SURGERY | Age: 74
End: 2024-01-26

## 2024-01-26 VITALS — BODY MASS INDEX: 32.78 KG/M2 | WEIGHT: 242 LBS | HEIGHT: 72 IN

## 2024-01-26 DIAGNOSIS — Z47.89 ORTHOPEDIC AFTERCARE: Primary | ICD-10-CM

## 2024-01-26 PROCEDURE — 99024 POSTOP FOLLOW-UP VISIT: CPT | Performed by: STUDENT IN AN ORGANIZED HEALTH CARE EDUCATION/TRAINING PROGRAM

## 2024-01-26 NOTE — PROGRESS NOTES
History: 73-year-old male presents for follow-up after open repair of right quadriceps tendon rupture.  This was on November 21.  He was initially in extension brace for 6 weeks.  We did start some physical therapy during this time for passive motion.  He has now progressed to range of motion as tolerated and started strengthening with physical therapy.  He is up to 130 on his bed and and maintaining good passive extension although he does have some difficulty with about a 5 degree residual extension lag actively.  He notes some weakness more so on the lateral side of his quadricep muscle at this time.  He notes some intermittent sharp pain under his patella.  He does have knee arthritis which I did do a steroid injection on about a year ago.  He is otherwise doing well.    Exam: Incisions well-healed without erythema or drainage.  No real effusion.  No tenderness of the quad tendon.  No defect over the quad tendon.  Range of motion shows that he is able to flex back to 120 rather easily today.  He has good active extension just a couple degree extension.  No neurovascular compromise distally    Imaging: No new    Assessment: 73-year-old male doing well about 9 weeks out from right quad tendon repair.    Plan: Continue outpatient therapy with an emphasis on strengthening at this point.  Wean out of the brace and off crutches as able.  Advised he starts Voltaren medication as he has previously responded well to this in the past and seems to have some patellofemoral arthritis as symptoms affecting his therapy at this point.  We will follow-up in about a month to assess his progress with rehab.  3 views of the right knee at that time.    Mayur Toledo MD

## 2024-01-30 ENCOUNTER — HOSPITAL ENCOUNTER (OUTPATIENT)
Dept: PHYSICAL THERAPY | Age: 74
Setting detail: THERAPIES SERIES
Discharge: HOME | End: 2024-01-30
Attending: STUDENT IN AN ORGANIZED HEALTH CARE EDUCATION/TRAINING PROGRAM
Payer: MEDICARE

## 2024-01-30 PROCEDURE — 97110 THERAPEUTIC EXERCISES: CPT

## 2024-01-30 PROCEDURE — 97016 VASOPNEUMATIC DEVICE THERAPY: CPT

## 2024-01-30 PROCEDURE — 97140 MANUAL THERAPY 1/> REGIONS: CPT

## 2024-01-30 NOTE — FLOWSHEET NOTE
[facility]- Outpatient Rehabilitation and Therapy  [address], [place] [address] office: [phone] fax: [phone]      Physical Therapy: TREATMENT/PROGRESS NOTE   Patient: Jovanny Morales (73 y.o. male)   Treatment Date: 2024   :  1950 MRN: [id number]   Visit #: 14   Insurance Allowable Auth Needed   BMN []Yes    [x]No    Insurance: Payor: [facility] MEDICARE / Plan: [facility] MEDICARE ADVANTAGE / Product Type: *No Product type* /   Insurance ID: [id number] - (Medicare Managed)  Secondary Insurance (if applicable):    Treatment Diagnosis:     ICD-10-CM    1. Right knee pain, unspecified chronicity  M25.561       2. Difficulty walking  R26.2          Medical Diagnosis:    Nontraumatic rupture of right quadriceps tendon [M66.251] S/p quad tendon repair (Date of Surgery: 23)   Referring Physician: Mayur Toledo MD  PCP: Ortiz Sanders                             Plan of care signed (Y/N): Yes    Date of Patient follow up with Physician:      Progress Report/POC: NO  POC update due: (10 visits /OR AUTH LIMITS, whichever is less)  24      Preferred Language for Healthcare:   [x]English       []other:    SUBJECTIVE EXAMINATION     Patient Report/Comments: Patient reports Friday's MD appt went well and he was pleased with ROM. Pt reports there is still that \"point\" when he goes to unlock his knee where it feels like it is could give out but once he is past that point it feels fine. Continues to use one crutch.      OBJECTIVE EXAMINATION     Observation: Enters PT ambulating with unilateral crutch (carrying the other) with T scope brace. Not using crutch to offweight RLE    Test measurements:    Test used Initial score  2023   Pain Summary VAS 2-7 1-210 0-210   Functional questionnaire LEFS  (70%)   34 (58%)   ROM  flexion  68 deg 126 PROM    ext  -1 0   Quad tone   poor fair     Exercises/Interventions: DOS

## 2024-02-01 ENCOUNTER — HOSPITAL ENCOUNTER (OUTPATIENT)
Dept: PHYSICAL THERAPY | Age: 74
Setting detail: THERAPIES SERIES
Discharge: HOME | End: 2024-02-01
Attending: STUDENT IN AN ORGANIZED HEALTH CARE EDUCATION/TRAINING PROGRAM
Payer: MEDICARE

## 2024-02-01 PROCEDURE — 97110 THERAPEUTIC EXERCISES: CPT | Performed by: PHYSICAL THERAPIST

## 2024-02-01 PROCEDURE — 97140 MANUAL THERAPY 1/> REGIONS: CPT | Performed by: PHYSICAL THERAPIST

## 2024-02-01 PROCEDURE — 97016 VASOPNEUMATIC DEVICE THERAPY: CPT | Performed by: PHYSICAL THERAPIST

## 2024-02-01 NOTE — FLOWSHEET NOTE
MADHAV Martinez, DPT [id number]               Date: 02/01/2024     Note: If patient does not return for scheduled/recommended follow up visits, this note will serve as a discharge from care along with the most recent update on progress.

## 2024-02-06 ENCOUNTER — HOSPITAL ENCOUNTER (OUTPATIENT)
Dept: PHYSICAL THERAPY | Age: 74
Setting detail: THERAPIES SERIES
Discharge: HOME | End: 2024-02-06
Attending: STUDENT IN AN ORGANIZED HEALTH CARE EDUCATION/TRAINING PROGRAM
Payer: MEDICARE

## 2024-02-06 PROCEDURE — 97112 NEUROMUSCULAR REEDUCATION: CPT | Performed by: PHYSICAL THERAPIST

## 2024-02-06 PROCEDURE — 97110 THERAPEUTIC EXERCISES: CPT | Performed by: PHYSICAL THERAPIST

## 2024-02-06 PROCEDURE — 97016 VASOPNEUMATIC DEVICE THERAPY: CPT | Performed by: PHYSICAL THERAPIST

## 2024-02-06 NOTE — FLOWSHEET NOTE
increased symptoms or restriction to work towards return to prior level of function.  Status: [x] Progressing: [] Met: [] Not Met: [] Adjusted  Patient will be able to ambulate for 15 minutes with no AD with normal gait pattern in order to be able to return to community ambulation without increased symptoms or restrictions.                                                                                                                    Status: [x] Progressing: [] Met: [] Not Met: [] Adjusted    Overall Progression Towards Functional goals/ Treatment Progress Update:  [x] Patient is progressing as expected towards functional goals listed.    [] Progression is slowed due to complexities/Impairments listed.  [] Progression has been slowed due to co-morbidities.  [] Plan just implemented, too soon (<30days) to assess goals progression   [] Goals require adjustment due to lack of progress  [] Patient is not progressing as expected and requires additional follow up with physician  [] Other:     CHARGE CAPTURE     PT CHARGE GRID   CPT Code (TIMED) minutes # CPT Code (UNTIMED) #     Therex (55897)  25' 2  EVAL:LOW (31771 - Typically 20 minutes face-to-face)     Neuromusc. Re-ed (38237) 10' 1  Re-Eval (19983)     Manual (69189) 5'   Estim Unattended (52427)     Ther. Act (93776)    Mech. Traction (11925)     Gait (41240)    Dry Needle 1-2 muscle (91067)     Aquatic Therex (61785)    Dry Needle 3+ muscle (20561)     Iontophoresis (56534)    VASO (89888) 1    Ultrasound (43426)    Group Therapy (78523)     Estim Attended (64196)    Canalith Repositioning (83464)     Other:    Other:    Total Timed Code Tx Minutes 40' 3       Total Treatment Minutes 60' (bike, vaso)      Charge Justification:  (03391) THERAPEUTIC EXERCISE - Provided verbal/tactile cueing for activities related to strengthening, flexibility, endurance, ROM performed to prevent loss of range of motion, maintain or improve muscular strength or increase

## 2024-02-08 ENCOUNTER — HOSPITAL ENCOUNTER (OUTPATIENT)
Dept: PHYSICAL THERAPY | Age: 74
Setting detail: THERAPIES SERIES
Discharge: HOME | End: 2024-02-08
Attending: STUDENT IN AN ORGANIZED HEALTH CARE EDUCATION/TRAINING PROGRAM
Payer: MEDICARE

## 2024-02-08 PROCEDURE — 97110 THERAPEUTIC EXERCISES: CPT | Performed by: PHYSICAL THERAPIST

## 2024-02-08 PROCEDURE — 97016 VASOPNEUMATIC DEVICE THERAPY: CPT | Performed by: PHYSICAL THERAPIST

## 2024-02-08 PROCEDURE — 97112 NEUROMUSCULAR REEDUCATION: CPT | Performed by: PHYSICAL THERAPIST

## 2024-02-08 NOTE — FLOWSHEET NOTE
without pain or dysfunction.  Status: [x] Progressing: [] Met: [] Not Met: [] Adjusted  Patient will be able to ascend/descend stairs reciprocally without increased symptoms or restriction to work towards return to prior level of function.  Status: [x] Progressing: [] Met: [] Not Met: [] Adjusted  Patient will be able to ambulate for 15 minutes with no AD with normal gait pattern in order to be able to return to community ambulation without increased symptoms or restrictions.                                                                                                                    Status: [x] Progressing: [] Met: [] Not Met: [] Adjusted    Overall Progression Towards Functional goals/ Treatment Progress Update:  [x] Patient is progressing as expected towards functional goals listed.    [] Progression is slowed due to complexities/Impairments listed.  [] Progression has been slowed due to co-morbidities.  [] Plan just implemented, too soon (<30days) to assess goals progression   [] Goals require adjustment due to lack of progress  [] Patient is not progressing as expected and requires additional follow up with physician  [] Other:     CHARGE CAPTURE     PT CHARGE GRID   CPT Code (TIMED) minutes # CPT Code (UNTIMED) #     Therex (93714)  25' 2  EVAL:LOW (41332 - Typically 20 minutes face-to-face)     Neuromusc. Re-ed (26098) 12' 1  Re-Eval (34265)     Manual (53909) 5'   Estim Unattended (77042)     Ther. Act (61699)    Mech. Traction (68012)     Gait (84547)    Dry Needle 1-2 muscle (18120)     Aquatic Therex (72986)    Dry Needle 3+ muscle (12497)     Iontophoresis (26688)    VASO (38620) 1    Ultrasound (44078)    Group Therapy (52192)     Estim Attended (22326)    Canalith Repositioning (17348)     Other:    Other:    Total Timed Code Tx Minutes 42' 3       Total Treatment Minutes 60' (bike, vaso)      Charge Justification:  (88816) THERAPEUTIC EXERCISE - Provided verbal/tactile cueing for activities

## 2024-02-13 ENCOUNTER — HOSPITAL ENCOUNTER (OUTPATIENT)
Dept: PHYSICAL THERAPY | Age: 74
Setting detail: THERAPIES SERIES
Discharge: HOME | End: 2024-02-13
Attending: STUDENT IN AN ORGANIZED HEALTH CARE EDUCATION/TRAINING PROGRAM
Payer: MEDICARE

## 2024-02-13 PROCEDURE — 97016 VASOPNEUMATIC DEVICE THERAPY: CPT | Performed by: PHYSICAL THERAPIST

## 2024-02-13 PROCEDURE — 97112 NEUROMUSCULAR REEDUCATION: CPT | Performed by: PHYSICAL THERAPIST

## 2024-02-13 PROCEDURE — 97110 THERAPEUTIC EXERCISES: CPT | Performed by: PHYSICAL THERAPIST

## 2024-02-13 NOTE — FLOWSHEET NOTE
[facility]- Outpatient Rehabilitation and Therapy  [address], [place] [address] office: [phone] fax: [phone]      Physical Therapy: TREATMENT/PROGRESS NOTE   Patient: Jovanny Morales (73 y.o. male)   Treatment Date: 2024   :  1950 MRN: [id number]   Visit #: 18   Insurance Allowable Auth Needed   BMN []Yes    [x]No    Insurance: Payor: [facility] MEDICARE / Plan: [facility] MEDICARE ADVANTAGE / Product Type: *No Product type* /   Insurance ID: [id number] - (Medicare Managed)  Secondary Insurance (if applicable):    Treatment Diagnosis:     ICD-10-CM    1. Right knee pain, unspecified chronicity  M25.561       2. Difficulty walking  R26.2          Medical Diagnosis:    Nontraumatic rupture of right quadriceps tendon [M66.251] S/p quad tendon repair (Date of Surgery: 23)   Referring Physician: Mayur Toledo MD  PCP: Ortiz Sanders                             Plan of care signed (Y/N): Yes    Date of Patient follow up with Physician: 24     Progress Report/POC: NO  POC update due: (10 visits /OR AUTH LIMITS, whichever is less)  24      Preferred Language for Healthcare:   [x]English       []other:    SUBJECTIVE EXAMINATION     Patient Report/Comments: Patient reports that his knee is doing well. Less feelings of instability/giving out. Not rally having pain unless he moves it a certain way      OBJECTIVE EXAMINATION     Observation: Enters PT ambulating with one crutch. Using it appropriately today    Test measurements:    Test used Initial score  2023             Pain Summary VAS 2-7 1-2/10 0-2/10    Functional questionnaire LEFS  (70%)   3480 (58%)    ROM  flexion  68 deg 126 PROM 127 AROM    ext  -1 0 0   Quad tone   poor fair      Exercises/Interventions: DOS 23  Restrictions/precautions: per MA: continue PT with emphasis on strengthening. Wean off crutches and brace as able. arrived to PT early to

## 2024-02-15 ENCOUNTER — HOSPITAL ENCOUNTER (OUTPATIENT)
Dept: PHYSICAL THERAPY | Age: 74
Setting detail: THERAPIES SERIES
Discharge: HOME | End: 2024-02-15
Attending: STUDENT IN AN ORGANIZED HEALTH CARE EDUCATION/TRAINING PROGRAM
Payer: MEDICARE

## 2024-02-15 PROCEDURE — 97112 NEUROMUSCULAR REEDUCATION: CPT | Performed by: PHYSICAL THERAPIST

## 2024-02-15 PROCEDURE — 97016 VASOPNEUMATIC DEVICE THERAPY: CPT | Performed by: PHYSICAL THERAPIST

## 2024-02-15 PROCEDURE — 97110 THERAPEUTIC EXERCISES: CPT | Performed by: PHYSICAL THERAPIST

## 2024-02-20 ENCOUNTER — HOSPITAL ENCOUNTER (OUTPATIENT)
Dept: PHYSICAL THERAPY | Age: 74
Setting detail: THERAPIES SERIES
Discharge: HOME | End: 2024-02-20
Attending: STUDENT IN AN ORGANIZED HEALTH CARE EDUCATION/TRAINING PROGRAM
Payer: MEDICARE

## 2024-02-20 PROCEDURE — 97016 VASOPNEUMATIC DEVICE THERAPY: CPT | Performed by: PHYSICAL THERAPIST

## 2024-02-20 PROCEDURE — 97112 NEUROMUSCULAR REEDUCATION: CPT | Performed by: PHYSICAL THERAPIST

## 2024-02-20 PROCEDURE — 97110 THERAPEUTIC EXERCISES: CPT | Performed by: PHYSICAL THERAPIST

## 2024-02-20 NOTE — PLAN OF CARE
[facility]- Outpatient Rehabilitation and Therapy  [address], [place] [address] office: [phone] fax: [phone]    Physical Therapy Re-Certification Plan of Care    Dear Mayur Toledo MD  ,    We had the pleasure of treating the following patient for physical therapy services at [facility] Outpatient Physical Therapy. A summary of our findings can be found in the updated assessment below.  This includes our plan of care.  If you have any questions or concerns regarding these findings, please do not hesitate to contact me at the office phone number checked above.  Thank you for the referral.     Physician Signature:________________________________Date:__________________  By signing above (or electronic signature), therapist's plan is approved by physician      Functional Outcome: 47/80 (41%)  Jovanny Morales 1950 continues to present with functional deficits in strength symmetry, flexibility, endurance of strength, and eccentric control  limiting ability with walking on uneven ground, walking up/down stairs, transitions between positions, lifting items, heavy home activity, and yardwork .  During therapy this date, patient required visual cueing, verbal cueing, modification of technique, progression of exercises and program, and manual interventions for exercise progression, improving proper muscle recruitment and activation/motor control patterns, and static and dynamic balance. Patient will continue to benefit from ongoing evaluation and advanced clinical decision from a Physical Therapist to improve muscle strength, neuromuscular control, endurance, balance and proprioception, functional mobility, and high level IADLs to safely return to PLOF and recreational activity without symptoms or restrictions.    Overall Response to Treatment:   [x]Patient is responding well to treatment and improvement is noted with regards to goals   []Patient should continue to

## 2024-02-22 ENCOUNTER — APPOINTMENT (OUTPATIENT)
Dept: PHYSICAL THERAPY | Age: 74
End: 2024-02-22
Attending: STUDENT IN AN ORGANIZED HEALTH CARE EDUCATION/TRAINING PROGRAM
Payer: MEDICARE

## 2024-02-23 ENCOUNTER — OFFICE VISIT (OUTPATIENT)
Dept: ORTHOPEDIC SURGERY | Age: 74
End: 2024-02-23

## 2024-02-23 ENCOUNTER — HOSPITAL ENCOUNTER (OUTPATIENT)
Dept: PHYSICAL THERAPY | Age: 74
Setting detail: THERAPIES SERIES
Discharge: HOME | End: 2024-02-23
Attending: STUDENT IN AN ORGANIZED HEALTH CARE EDUCATION/TRAINING PROGRAM
Payer: MEDICARE

## 2024-02-23 DIAGNOSIS — M25.561 ACUTE PAIN OF RIGHT KNEE: ICD-10-CM

## 2024-02-23 DIAGNOSIS — S76.111A RUPTURE OF RIGHT QUADRICEPS TENDON, INITIAL ENCOUNTER: Primary | ICD-10-CM

## 2024-02-23 PROCEDURE — 97112 NEUROMUSCULAR REEDUCATION: CPT

## 2024-02-23 PROCEDURE — 97110 THERAPEUTIC EXERCISES: CPT

## 2024-02-23 PROCEDURE — 97016 VASOPNEUMATIC DEVICE THERAPY: CPT

## 2024-02-23 NOTE — FLOWSHEET NOTE
strengthening, flexibility, endurance, ROM performed to prevent loss of range of motion, maintain or improve muscular strength or increase flexibility, following either an injury or surgery.   (32770) HOME EXERCISE PROGRAM - Reviewed/Progressed HEP activities related to strengthening, flexibility, endurance, ROM performed to prevent loss of range of motion, maintain or improve muscular strength or increase flexibility, following either an injury or surgery.  (83757) NEUROMUSCULAR RE-EDUCATION - Therapeutic procedure, 1 or more areas, each 15 minutes; neuromuscular reeducation of movement, balance, coordination, kinesthetic sense, posture, and/or proprioception for sitting and/or standing activities  (32862) HOME EXERCISE PROGRAM - Reviewed/Progressed HEP activities related to neuromuscular reeducation of movement, balance, coordination, kinesthetic sense, posture, and/or proprioception for sitting and/or standing activities    (41052) MANUAL THERAPY -  Manual therapy techniques, 1 or more regions, each 15 minutes (Mobilization/manipulation, manual lymphatic drainage, manual traction) for the purpose of modulating pain, promoting relaxation,  increasing ROM, reducing/eliminating soft tissue swelling/inflammation/restriction, improving soft tissue extensibility and allowing for proper ROM for normal function with self care, mobility, lifting and ambulation  (26202) VASOPNEUMATIC    TREATMENT PLAN   Plan: Cont POC- Continue emphasis/focus on exercise progression, improving proper muscle recruitment and activation/motor control patterns, increasing ROM, improving soft tissue extensibility, and allowing for proper ROM. Next visit plan to progress reps, add new exercises, and adjust HEP  1-2x/wk for 4-6 more weeks from 2/20/24    Electronically Signed by Ami Garcia PT, DPT               Date: 02/23/2024     Note: If patient does not return for scheduled/recommended follow up visits, this note will serve as a discharge

## 2024-02-23 NOTE — PROGRESS NOTES
History: 73-year-old male presents for follow-up after open repair of right quadriceps tendon rupture.  This was on November 21.  He was initially in extension brace for 6 weeks.  We did start some physical therapy during this time for passive motion.  He has now progressed to range of motion as tolerated and started strengthening with physical therapy.  He is up to 130 of flexion and has good active extension with decreasing knee pain.  He still having some anterior knee pain with stairs but this seems to be improving.      Exam: Incisions well-healed without erythema or drainage.  No real effusion.  No tenderness of the quad tendon.  No defect over the quad tendon.  Range of motion shows that he is able to flex back to 130 rather easily today.  He has good active extension just a couple degree extension.  No neurovascular compromise distally    Imaging: No new    Assessment: 73-year-old male doing well status post right quadriceps tendon repair November 21.  Also with patellofemoral arthritis.    Plan: Continue outpatient therapy at an ultimate duration per the therapist and patient discretion.  We discussed transitioning to home exercises including using his recumbent bike at home.  I did discuss with him that his patellofemoral arthritis likely worse today after his injury and surgery.  He would be a candidate for steroid injection anytime if his anterior knee pain is worsening.  Follow-up as needed.    Mayur Toledo MD

## 2024-02-27 ENCOUNTER — HOSPITAL ENCOUNTER (OUTPATIENT)
Dept: PHYSICAL THERAPY | Age: 74
Setting detail: THERAPIES SERIES
Discharge: HOME | End: 2024-02-27
Attending: STUDENT IN AN ORGANIZED HEALTH CARE EDUCATION/TRAINING PROGRAM
Payer: MEDICARE

## 2024-02-27 PROCEDURE — 97110 THERAPEUTIC EXERCISES: CPT

## 2024-02-27 PROCEDURE — 97016 VASOPNEUMATIC DEVICE THERAPY: CPT

## 2024-02-27 PROCEDURE — 97112 NEUROMUSCULAR REEDUCATION: CPT

## 2024-02-29 ENCOUNTER — HOSPITAL ENCOUNTER (OUTPATIENT)
Dept: PHYSICAL THERAPY | Age: 74
Setting detail: THERAPIES SERIES
Discharge: HOME | End: 2024-02-29
Attending: STUDENT IN AN ORGANIZED HEALTH CARE EDUCATION/TRAINING PROGRAM
Payer: MEDICARE

## 2024-02-29 PROCEDURE — 97110 THERAPEUTIC EXERCISES: CPT

## 2024-02-29 PROCEDURE — 97112 NEUROMUSCULAR REEDUCATION: CPT

## 2024-02-29 PROCEDURE — 97016 VASOPNEUMATIC DEVICE THERAPY: CPT

## 2024-02-29 NOTE — FLOWSHEET NOTE
[facility]- Outpatient Rehabilitation and Therapy  [address], [place] [address] office: [phone] fax: [phone]      Physical Therapy: TREATMENT/PROGRESS NOTE   Patient: Jovanny Morales (73 y.o. male)   Treatment Date: 2024   :  1950 MRN: [id number]   Visit #: 23   Insurance Allowable Auth Needed   BMN []Yes    [x]No    Insurance: Payor: [facility] MEDICARE / Plan: [facility] MEDICARE ADVANTAGE / Product Type: *No Product type* /   Insurance ID: [id number] - (Medicare Managed)  Secondary Insurance (if applicable):    Treatment Diagnosis:     ICD-10-CM    1. Right knee pain, unspecified chronicity  M25.561       2. Difficulty walking  R26.2          Medical Diagnosis:    Nontraumatic rupture of right quadriceps tendon [M66.251] S/p quad tendon repair (Date of Surgery: 23)   Referring Physician: Mayur Toledo MD  PCP: Ortiz Sanders                             Plan of care signed (Y/N): Yes    Date of Patient follow up with Physician: 24     Progress Report/POC: NO  POC update due: (10 visits /OR AUTH LIMITS, whichever is less)  3/21/24      Preferred Language for Healthcare:   [x]English       []other:    SUBJECTIVE EXAMINATION     Patient Report/Comments: Patient reports no pain or soreness after last session. Out of the brace within his house, still wearing it outside of the home.      OBJECTIVE EXAMINATION     Observation: no crutches; brace on    Test measurements:         Test used Initial score  24            Pain Summary VAS 2-7 1-2  0-2    Functional questionnaire LEFS  (70%)    (58%) 41%    ROM  flexion  100 119 SB rolls 130 AROM 135 AROM no pain    ext  0 0 0    Quad tone   Fair Fair Fair+ Fair+   MMT R Hip flexion    4/5     R Hip abd    4-/5     R Hip ER    4/5     R Knee flex    4+/5     R Knee ext    4-/5 (in available range      Exercises/Interventions: DOS

## 2024-03-05 ENCOUNTER — HOSPITAL ENCOUNTER (OUTPATIENT)
Dept: PHYSICAL THERAPY | Age: 74
Setting detail: THERAPIES SERIES
Discharge: HOME | End: 2024-03-05
Attending: STUDENT IN AN ORGANIZED HEALTH CARE EDUCATION/TRAINING PROGRAM
Payer: MEDICARE

## 2024-03-05 PROCEDURE — 97112 NEUROMUSCULAR REEDUCATION: CPT | Performed by: PHYSICAL THERAPIST

## 2024-03-05 PROCEDURE — 97016 VASOPNEUMATIC DEVICE THERAPY: CPT | Performed by: PHYSICAL THERAPIST

## 2024-03-05 PROCEDURE — 97110 THERAPEUTIC EXERCISES: CPT | Performed by: PHYSICAL THERAPIST

## 2024-03-05 NOTE — FLOWSHEET NOTE
Yin Martinez, PT, DPT [id number]               Date: 03/05/2024     Note: If patient does not return for scheduled/recommended follow up visits, this note will serve as a discharge from care along with the most recent update on progress.

## 2024-03-07 ENCOUNTER — HOSPITAL ENCOUNTER (OUTPATIENT)
Dept: PHYSICAL THERAPY | Age: 74
Setting detail: THERAPIES SERIES
Discharge: HOME | End: 2024-03-07
Attending: STUDENT IN AN ORGANIZED HEALTH CARE EDUCATION/TRAINING PROGRAM
Payer: MEDICARE

## 2024-03-07 PROCEDURE — 97016 VASOPNEUMATIC DEVICE THERAPY: CPT | Performed by: PHYSICAL THERAPIST

## 2024-03-07 PROCEDURE — 97110 THERAPEUTIC EXERCISES: CPT | Performed by: PHYSICAL THERAPIST

## 2024-03-07 PROCEDURE — 97112 NEUROMUSCULAR REEDUCATION: CPT | Performed by: PHYSICAL THERAPIST

## 2024-03-07 NOTE — FLOWSHEET NOTE
muscle (57096)     Iontophoresis (99665)    VASO (43569) 1    Ultrasound (67878)    Group Therapy (97155)     Estim Attended (49543)    Canalith Repositioning (40513)     Other:    Other:    Total Timed Code Tx Minutes 42' 3  1     Total Treatment Minutes 52'      Charge Justification:  (71063) THERAPEUTIC EXERCISE - Provided verbal/tactile cueing for activities related to strengthening, flexibility, endurance, ROM performed to prevent loss of range of motion, maintain or improve muscular strength or increase flexibility, following either an injury or surgery.   (20591) HOME EXERCISE PROGRAM - Reviewed/Progressed HEP activities related to strengthening, flexibility, endurance, ROM performed to prevent loss of range of motion, maintain or improve muscular strength or increase flexibility, following either an injury or surgery.  (59433) NEUROMUSCULAR RE-EDUCATION - Therapeutic procedure, 1 or more areas, each 15 minutes; neuromuscular reeducation of movement, balance, coordination, kinesthetic sense, posture, and/or proprioception for sitting and/or standing activities  (06732) VASOPNEUMATIC    TREATMENT PLAN   Plan: Cont POC- Continue emphasis/focus on exercise progression, improving proper muscle recruitment and activation/motor control patterns, increasing ROM, improving soft tissue extensibility, and allowing for proper ROM. Next visit plan to progress reps, add new exercises, and adjust HEP  1-2x/wk for 4-6 more weeks from 2/20/24    Electronically Signed by Yin Martinez PT, DPT [id number]               Date: 03/07/2024     Note: If patient does not return for scheduled/recommended follow up visits, this note will serve as a discharge from care along with the most recent update on progress.

## 2024-03-11 ENCOUNTER — APPOINTMENT (OUTPATIENT)
Dept: PHYSICAL THERAPY | Age: 74
End: 2024-03-11
Attending: STUDENT IN AN ORGANIZED HEALTH CARE EDUCATION/TRAINING PROGRAM
Payer: MEDICARE

## 2024-03-12 ENCOUNTER — APPOINTMENT (OUTPATIENT)
Dept: PHYSICAL THERAPY | Age: 74
End: 2024-03-12
Attending: STUDENT IN AN ORGANIZED HEALTH CARE EDUCATION/TRAINING PROGRAM
Payer: MEDICARE

## 2024-03-12 ENCOUNTER — HOSPITAL ENCOUNTER (OUTPATIENT)
Dept: PHYSICAL THERAPY | Age: 74
Setting detail: THERAPIES SERIES
Discharge: HOME | End: 2024-03-12
Attending: STUDENT IN AN ORGANIZED HEALTH CARE EDUCATION/TRAINING PROGRAM
Payer: MEDICARE

## 2024-03-12 PROCEDURE — 97016 VASOPNEUMATIC DEVICE THERAPY: CPT | Performed by: PHYSICAL THERAPIST

## 2024-03-12 PROCEDURE — 97112 NEUROMUSCULAR REEDUCATION: CPT | Performed by: PHYSICAL THERAPIST

## 2024-03-12 PROCEDURE — 97110 THERAPEUTIC EXERCISES: CPT | Performed by: PHYSICAL THERAPIST

## 2024-03-12 NOTE — FLOWSHEET NOTE
Dry Needle 3+ muscle (36634)     Iontophoresis (92889)    VASO (11058) 1    Ultrasound (30894)    Group Therapy (59754)     Estim Attended (79945)    Canalith Repositioning (23406)     Other:    Other:    Total Timed Code Tx Minutes 41' 3  1     Total Treatment Minutes 51'      Charge Justification:  (83172) THERAPEUTIC EXERCISE - Provided verbal/tactile cueing for activities related to strengthening, flexibility, endurance, ROM performed to prevent loss of range of motion, maintain or improve muscular strength or increase flexibility, following either an injury or surgery.   (16766) HOME EXERCISE PROGRAM - Reviewed/Progressed HEP activities related to strengthening, flexibility, endurance, ROM performed to prevent loss of range of motion, maintain or improve muscular strength or increase flexibility, following either an injury or surgery.  (00520) NEUROMUSCULAR RE-EDUCATION - Therapeutic procedure, 1 or more areas, each 15 minutes; neuromuscular reeducation of movement, balance, coordination, kinesthetic sense, posture, and/or proprioception for sitting and/or standing activities  (25895) VASOPNEUMATIC    TREATMENT PLAN   Plan: Cont POC- Continue emphasis/focus on exercise progression, improving proper muscle recruitment and activation/motor control patterns, increasing ROM, improving soft tissue extensibility, and allowing for proper ROM. Next visit plan to progress reps, add new exercises, and adjust HEP  1-2x/wk for 4-6 more weeks from 2/20/24    Electronically Signed by Yin Martinez PT, DPT [id number]               Date: 03/12/2024     Note: If patient does not return for scheduled/recommended follow up visits, this note will serve as a discharge from care along with the most recent update on progress.

## 2024-03-13 ENCOUNTER — TELEPHONE (OUTPATIENT)
Dept: CARDIOLOGY CLINIC | Age: 74
End: 2024-03-13

## 2024-03-13 NOTE — TELEPHONE ENCOUNTER
Pt would like to know if it is okay for him to get dental work done or if he should wait until after next OV. Pt states he needs to get crowns. Please advise.

## 2024-03-14 ENCOUNTER — HOSPITAL ENCOUNTER (OUTPATIENT)
Dept: PHYSICAL THERAPY | Age: 74
Setting detail: THERAPIES SERIES
Discharge: HOME | End: 2024-03-14
Attending: STUDENT IN AN ORGANIZED HEALTH CARE EDUCATION/TRAINING PROGRAM
Payer: MEDICARE

## 2024-03-14 PROCEDURE — 97016 VASOPNEUMATIC DEVICE THERAPY: CPT | Performed by: PHYSICAL THERAPIST

## 2024-03-14 PROCEDURE — 97110 THERAPEUTIC EXERCISES: CPT | Performed by: PHYSICAL THERAPIST

## 2024-03-14 PROCEDURE — 97112 NEUROMUSCULAR REEDUCATION: CPT | Performed by: PHYSICAL THERAPIST

## 2024-03-14 NOTE — FLOWSHEET NOTE
[facility]- Outpatient Rehabilitation and Therapy  [address], [place] [address] office: [phone] fax: [phone]      Physical Therapy: TREATMENT/PROGRESS NOTE   Patient: Jovanny Morales (73 y.o. male)   Treatment Date: 2024   :  1950 MRN: [id number]   Visit #: 27   Insurance Allowable Auth Needed   BMN []Yes    [x]No    Insurance: Payor: [facility] MEDICARE / Plan: [facility] MEDICARE ADVANTAGE / Product Type: *No Product type* /   Insurance ID: [id number] - (Medicare Managed)  Secondary Insurance (if applicable):    Treatment Diagnosis:     ICD-10-CM    1. Right knee pain, unspecified chronicity  M25.561       2. Difficulty walking  R26.2          Medical Diagnosis:    Nontraumatic rupture of right quadriceps tendon [M66.251] S/p quad tendon repair (Date of Surgery: 23)   Referring Physician: Mayur Toledo MD  PCP: Ortiz Sanders                             Plan of care signed (Y/N): Yes    Date of Patient follow up with Physician: 24     Progress Report/POC: NO  POC update due: (10 visits /OR AUTH LIMITS, whichever is less)  3/21/24      Preferred Language for Healthcare:   [x]English       []other:    SUBJECTIVE EXAMINATION     Patient Report/Comments: Patient reports that he has not worn his brace the past 3 days and it has felt good. Stil has a \"weak\" spot in the bend.      OBJECTIVE EXAMINATION     Observation: continued decreased functional TKE due to quad weakness; improve stability during SLS    Test measurements: PN NV        Test used Initial score  24              Pain Summary VAS 2-7 1-2  0-2 0-/10   Functional questionnaire LEFS  (70%)    (58%) 41%    ROM  flexion  100 119 SB rolls 130 AROM     ext  0 0 0    Quad tone   Fair Fair Fair+    MMT R Hip flexion    4/5     R Hip abd    4-/5     R Hip ER    4/5     R Knee flex    4+/5     R Knee ext    4-/5 (in available range

## 2024-03-14 NOTE — TELEPHONE ENCOUNTER
Patrick Carbajal MD  [facility] Ep14 hours ago (6:10 PM)       No objection to proceeding with dental work. No need to await next visit.   I attempted to call the patient to relay. JODI.

## 2024-03-19 ENCOUNTER — APPOINTMENT (OUTPATIENT)
Dept: PHYSICAL THERAPY | Age: 74
End: 2024-03-19
Attending: STUDENT IN AN ORGANIZED HEALTH CARE EDUCATION/TRAINING PROGRAM
Payer: MEDICARE

## 2024-03-21 ENCOUNTER — HOSPITAL ENCOUNTER (OUTPATIENT)
Dept: PHYSICAL THERAPY | Age: 74
Setting detail: THERAPIES SERIES
Discharge: HOME | End: 2024-03-21
Attending: STUDENT IN AN ORGANIZED HEALTH CARE EDUCATION/TRAINING PROGRAM
Payer: MEDICARE

## 2024-03-21 PROCEDURE — 97016 VASOPNEUMATIC DEVICE THERAPY: CPT | Performed by: PHYSICAL THERAPIST

## 2024-03-21 PROCEDURE — 97110 THERAPEUTIC EXERCISES: CPT | Performed by: PHYSICAL THERAPIST

## 2024-03-21 PROCEDURE — 97112 NEUROMUSCULAR REEDUCATION: CPT | Performed by: PHYSICAL THERAPIST

## 2024-03-26 ENCOUNTER — APPOINTMENT (OUTPATIENT)
Dept: PHYSICAL THERAPY | Age: 74
End: 2024-03-26
Attending: STUDENT IN AN ORGANIZED HEALTH CARE EDUCATION/TRAINING PROGRAM
Payer: MEDICARE

## 2024-03-26 NOTE — PROGRESS NOTES
Assessment:     1. Atrial fibrillation: patient had first documented episode of atrial fibrillation (November 2023).     Associated symptoms: None      History of cardioversion: No prior history of cardioversion  History of AF ablation: No history of atrial fibrillation ablation in the past  History of heart surgery/procedure: no  History of other cardiac arrhythmias: no     Current use of anti-arrhythmic drugs: Not currently on anti-arrhythmic drugs  Previous other use of anti-arrhythmic drugs: Not previously on any anti-arrhythmic drugs     Overall LV function: Normal left ventricular systolic function  Size of left atrium: Normal LA size  Significant cardiac valvular disease: No significant valve disease     Family history of atrial fibrillation: No     Alcohol consumption: Moderate alcohol consumption  Caffeine consumption: No/minimal caffeine intake  Smoking status: non-smoker  Obstructive sleep apnea: On CPAP/BiPAP therapy  Exercise status: Mild exercise routine     I had a discussion with the patient about issues related to atrial fibrillation (including etiology, disease progression patterns, stroke risk and rate control issues). Patient had his questions answered to his satisfaction.       Patient has a [id number] score of 2 [Age over 65 (1 point) and Hypertension (1 point)]  Longterm anticoagulation is: recommended  Current anticoagulation: apixaban  Bleeding issues reported: no  Renal function: Normal renal function  Thyroid function:  no TSH on file     Patient diagnosed with atrial fibrillation in November 2023 as he was being prepared for orthopedic surgery.  He did not feel any palpitations. It was felt likely that his arrhythmia had been ongoing for some time with good rate control on beta-blocker therapy.  No evidence of volume overload.  Hemodynamically stable.  Recently diagnosed with sleep apnea which is likely responsible, at least in part, for predisposing him to atrial fibrillation. Good

## 2024-03-28 ENCOUNTER — HOSPITAL ENCOUNTER (OUTPATIENT)
Dept: PHYSICAL THERAPY | Age: 74
Setting detail: THERAPIES SERIES
Discharge: HOME | End: 2024-03-28
Attending: STUDENT IN AN ORGANIZED HEALTH CARE EDUCATION/TRAINING PROGRAM
Payer: MEDICARE

## 2024-03-28 PROCEDURE — 97016 VASOPNEUMATIC DEVICE THERAPY: CPT | Performed by: PHYSICAL THERAPIST

## 2024-03-28 PROCEDURE — 97110 THERAPEUTIC EXERCISES: CPT | Performed by: PHYSICAL THERAPIST

## 2024-03-28 PROCEDURE — 97112 NEUROMUSCULAR REEDUCATION: CPT | Performed by: PHYSICAL THERAPIST

## 2024-03-28 NOTE — FLOWSHEET NOTE
available range 4/5     Exercises/Interventions: DOS 11/21/23  Restrictions/precautions: per MA: continue PT with emphasis on strengthening. Wean off crutches and brace as able. arrived to PT early to warm up on bike    Therapeutic Ex (22339)  resistance Sets/time Reps Notes/Cues/Progressions   Standing HS stretch at stairs  30\" 3x          Difficult; min A for full extension but pt unable to maintain TKE   SLR flexion  2 15x Sets of 5   Gentle          LAQ ecc focus 2# 5\" 2 x 10 Up with LUE, slow eccentric lower      Standing hip abd 75# 2 15x ea R/L    LBW  GVL 3 laps  12' Slight bend in knee   Standing TKE MAH + march 120# 5\" 20x Leg press # 2  2   10  10          FSU 4\" box   4x Fingertip support   HSC  65# 2 10 SLS On airex 10\" 10x  Fingertip support as needed   SLS RLE with glider post and digaonal reach    17 total Able to perform 7 and  then 10   Improved control             Patient ed  3'  Brace wear only when ambulating on unstable surfaces or when fatigued   Bike  5'  Warm up                 Manual Intervention (54511)  TIME      gentle       STW distal quad  4'                  NMR re-education (37819) resistance Sets/time Reps CUES NEEDED                                      Therapeutic Activity (00084)  Sets/time                                          Modalities:    Vasopneumatic Compression (Game Ready): Applied to right knee for significant edema, swelling, pain control for  15 minutes.  Relevant ICD-10 R22.4 Localized swelling of lower limb.   Girth Left Right Post Vaso   Knee: Midpatella 43.5 cm 43.6cm 43.4cm   Knee: 2 in cm above 44.7 cm 45.4 45.2                 Education/Home Exercise Program: Patient HEP program created electronically.  Refer to [employer] access code: [email or web address]      ASSESSMENT     Today's Assessment: Patient able to perform increased reps of glider (7 and then 10) before fatigue. Also able to perform FSU for 2-3 reps with  minimal UE support on 4\" step before fatigue.

## 2024-04-03 ENCOUNTER — HOSPITAL ENCOUNTER (OUTPATIENT)
Age: 74
Discharge: HOME | End: 2024-04-03
Payer: MEDICARE

## 2024-04-03 ENCOUNTER — OFFICE VISIT (OUTPATIENT)
Dept: CARDIOLOGY CLINIC | Age: 74
End: 2024-04-03
Payer: MEDICARE

## 2024-04-03 VITALS
SYSTOLIC BLOOD PRESSURE: 118 MMHG | BODY MASS INDEX: 32.86 KG/M2 | HEART RATE: 63 BPM | HEIGHT: 72 IN | DIASTOLIC BLOOD PRESSURE: 70 MMHG | WEIGHT: 242.6 LBS | OXYGEN SATURATION: 99 %

## 2024-04-03 DIAGNOSIS — E66.9 OBESITY (BMI 30.0-34.9): ICD-10-CM

## 2024-04-03 DIAGNOSIS — I10 ESSENTIAL HYPERTENSION: ICD-10-CM

## 2024-04-03 DIAGNOSIS — G47.33 OSA (OBSTRUCTIVE SLEEP APNEA): ICD-10-CM

## 2024-04-03 DIAGNOSIS — I48.91 NEW ONSET A-FIB (HCC): ICD-10-CM

## 2024-04-03 DIAGNOSIS — I48.19 PERSISTENT ATRIAL FIBRILLATION (HCC): Primary | ICD-10-CM

## 2024-04-03 LAB
ALBUMIN SERPL-MCNC: 4.1 G/DL (ref 3.4–5)
ALBUMIN/GLOB SERPL: 1.8 {RATIO} (ref 1.1–2.2)
ALP SERPL-CCNC: 68 U/L (ref 40–129)
ALT SERPL-CCNC: 14 U/L (ref 10–40)
ANION GAP SERPL CALCULATED.3IONS-SCNC: 11 MMOL/L (ref 3–16)
AST SERPL-CCNC: 19 U/L (ref 15–37)
BILIRUB SERPL-MCNC: 0.6 MG/DL (ref 0–1)
BUN SERPL-MCNC: 19 MG/DL (ref 7–20)
CALCIUM SERPL-MCNC: 8.9 MG/DL (ref 8.3–10.6)
CHLORIDE SERPL-SCNC: 101 MMOL/L (ref 99–110)
CO2 SERPL-SCNC: 25 MMOL/L (ref 21–32)
CREAT SERPL-MCNC: 0.7 MG/DL (ref 0.8–1.3)
GFR SERPLBLD CREATININE-BSD FMLA CKD-EPI: >90 ML/MIN/{1.73_M2}
GLUCOSE SERPL-MCNC: 98 MG/DL (ref 70–99)
POTASSIUM SERPL-SCNC: 4.2 MMOL/L (ref 3.5–5.1)
PROT SERPL-MCNC: 6.4 G/DL (ref 6.4–8.2)
SODIUM SERPL-SCNC: 137 MMOL/L (ref 136–145)
TSH SERPL DL<=0.005 MIU/L-ACNC: 2.03 UIU/ML (ref 0.27–4.2)

## 2024-04-03 PROCEDURE — 84443 ASSAY THYROID STIM HORMONE: CPT

## 2024-04-03 PROCEDURE — 80053 COMPREHEN METABOLIC PANEL: CPT

## 2024-04-03 PROCEDURE — 3074F SYST BP LT 130 MM HG: CPT | Performed by: INTERNAL MEDICINE

## 2024-04-03 PROCEDURE — 99214 OFFICE O/P EST MOD 30 MIN: CPT | Performed by: INTERNAL MEDICINE

## 2024-04-03 PROCEDURE — 3078F DIAST BP <80 MM HG: CPT | Performed by: INTERNAL MEDICINE

## 2024-04-03 PROCEDURE — 1123F ACP DISCUSS/DSCN MKR DOCD: CPT | Performed by: INTERNAL MEDICINE

## 2024-04-03 PROCEDURE — 36415 COLL VENOUS BLD VENIPUNCTURE: CPT

## 2024-04-03 PROCEDURE — 93000 ELECTROCARDIOGRAM COMPLETE: CPT | Performed by: INTERNAL MEDICINE

## 2024-04-03 RX ORDER — METOPROLOL SUCCINATE 50 MG/1
25 TABLET, EXTENDED RELEASE ORAL DAILY
Qty: 45 TABLET | Refills: 0 | Status: SHIPPED
Start: 2024-04-03

## 2024-04-03 RX ORDER — AMIODARONE HYDROCHLORIDE 200 MG/1
200 TABLET ORAL DAILY
Qty: 90 TABLET | Refills: 0 | Status: SHIPPED | OUTPATIENT
Start: 2024-04-03

## 2024-04-03 NOTE — PATIENT INSTRUCTIONS
Plan:     Start Amiodarone 200 mg daily. Discussed the risks and benefits of a cardioversion in 4 - 6 weeks (literature provided).   - Hold vitamin C, Hyzaar, and multivitamin the morning of the procedure.   - Do not miss any doses of Eliquis.   Decrease Metoprolol Succinate to 25 mg nightly.   Labs: TSH and CMP  Follow up with me in 3 months.

## 2024-04-04 ENCOUNTER — HOSPITAL ENCOUNTER (OUTPATIENT)
Dept: PHYSICAL THERAPY | Age: 74
Setting detail: THERAPIES SERIES
Discharge: HOME | End: 2024-04-04
Attending: STUDENT IN AN ORGANIZED HEALTH CARE EDUCATION/TRAINING PROGRAM
Payer: MEDICARE

## 2024-04-04 ENCOUNTER — TELEPHONE (OUTPATIENT)
Dept: CARDIOLOGY CLINIC | Age: 74
End: 2024-04-04

## 2024-04-04 PROCEDURE — 97110 THERAPEUTIC EXERCISES: CPT | Performed by: PHYSICAL THERAPIST

## 2024-04-04 PROCEDURE — 97016 VASOPNEUMATIC DEVICE THERAPY: CPT | Performed by: PHYSICAL THERAPIST

## 2024-04-04 PROCEDURE — 97112 NEUROMUSCULAR REEDUCATION: CPT | Performed by: PHYSICAL THERAPIST

## 2024-04-04 NOTE — FLOWSHEET NOTE
Charge Justification:  (32002) THERAPEUTIC EXERCISE - Provided verbal/tactile cueing for activities related to strengthening, flexibility, endurance, ROM performed to prevent loss of range of motion, maintain or improve muscular strength or increase flexibility, following either an injury or surgery.   (13585) HOME EXERCISE PROGRAM - Reviewed/Progressed HEP activities related to strengthening, flexibility, endurance, ROM performed to prevent loss of range of motion, maintain or improve muscular strength or increase flexibility, following either an injury or surgery.  (10210) NEUROMUSCULAR RE-EDUCATION - Therapeutic procedure, 1 or more areas, each 15 minutes; neuromuscular reeducation of movement, balance, coordination, kinesthetic sense, posture, and/or proprioception for sitting and/or standing activities  (94347) VASOPNEUMATIC    TREATMENT PLAN   Plan: Alter current plan:     1-2x/wk for 4 more weeks from 3/21/24    Electronically Signed by Yin Martinez PT, DPT [id number]               Date: 04/04/2024     Note: If patient does not return for scheduled/recommended follow up visits, this note will serve as a discharge from care along with the most recent update on progress.

## 2024-04-04 NOTE — TELEPHONE ENCOUNTER
[facility]  EP Procedure Sheet    04/03/2024  Jovanny Morales  1950  [id number]  EP Procedures  [] Pacemaker implant (single/dual) [] EP Study   [] ICD implant (single/dual) [] Atrial flutter ablation (RADHA Y/N)   [] Biv implant ICD [] Tilt Table   [] Biv implant PPM [] Atrial fibrillation ablation (RADHA Yes)   [] Generator Change (PPM/ICD/BiV) [] SVT ablation   [] Lead revision (RV/LA/RA) (<1 month) [] PVC ablation     [] Lead extraction +/- upgrade (BiV/PPM/ICD) [] VT Ischemic/ non-ischemic   [] Loop implant/ removal [] VT RVOT   [x] Cardioversion [] VT Left sided   [x] RADHA [] AVN ablation   Equipment  [] Medtronic  [] LAWRENCE Mapping System    [] Precision [] Ensite X   [] St. Tr/Garza [] Carto Mapping System   [] Diamond Scientific [] CryoAblation   [] Biotronik [] Laser Lead Extraction   EP Procedures Scheduling Request  # hours Requested  [x]1 []2 []2-4 [] 4-6 Scheduled  Date:   Specific Day  Completed    Anesthesia [x]yes []no F/u Date:   CT surgery backup []yes [x]no Location []FF[]AND   Overnight stay   []KW[]Donato Beth MD []RMM []MXA []MW  []UL []CMV  []WW [] RWH   [x]KA [] JMB [] AJK  First vs repeat   []1st [] 2nd [] 3rd   Pre-Procedure Labs / Imaging  [] PT/INR [] Type & cross   [x] CBC [] Units PRBC   [x] BMP/Mg [] Units FFP   [] Venogram [] Cardiac CTA for Pulmonary vein mapping     RN INITIALS: [id number]    Patient Instructions  Dx:AF  ICD-10 code: I48.91  Medication Instructions: Hold []Xarelto []Eliquis []Coumadin []pradaxa for   Do not eat or drink after midnight the night prior to procedure [x] Yes [] No    Medications to hold (Will need reviewed with patient after scheduling):  - Hold vitamin C, Hyzaar, and multivitamin the morning of the procedure.   - Do not miss any doses of Eliquis.     Please schedule in 6 weeks

## 2024-04-11 ENCOUNTER — HOSPITAL ENCOUNTER (OUTPATIENT)
Dept: PHYSICAL THERAPY | Age: 74
Setting detail: THERAPIES SERIES
Discharge: HOME | End: 2024-04-11
Attending: STUDENT IN AN ORGANIZED HEALTH CARE EDUCATION/TRAINING PROGRAM
Payer: MEDICARE

## 2024-04-11 PROCEDURE — 97110 THERAPEUTIC EXERCISES: CPT | Performed by: PHYSICAL THERAPIST

## 2024-04-11 PROCEDURE — 97112 NEUROMUSCULAR REEDUCATION: CPT | Performed by: PHYSICAL THERAPIST

## 2024-04-11 PROCEDURE — 97016 VASOPNEUMATIC DEVICE THERAPY: CPT | Performed by: PHYSICAL THERAPIST

## 2024-04-11 PROCEDURE — 97140 MANUAL THERAPY 1/> REGIONS: CPT | Performed by: PHYSICAL THERAPIST

## 2024-04-11 NOTE — DISCHARGE SUMMARY
[facility]  [address], [place] [address] office: [phone] fax: [phone]   Physical Therapy Discharge Summary    Dear Mayur Toledo MD  ,    We had the pleasure of treating the following patient for physical therapy services at [facility].  A summary of our findings can be found in the discharge summary below.  If you have any questions or concerns regarding these findings, please do not hesitate to contact me at the office phone number checked above.  Thank you for the referral.     Physician Signature:________________________________Date:__________________  By signing above (or electronic signature), therapist’s plan is approved by physician      Functional Outcome:     LEFS 40%      Overall Response to Treatment:   [x]Patient is responding well to treatment and improvement is noted with regards  to goals   []Patient should continue to improve in reasonable time if they continue HEP   []Patient has plateaued and is no longer responding to skilled PT intervention    []Patient is getting worse and would benefit from return to referring MD   []Patient unable to adhere to initial POC   []Other:     Total Visits: 31     Recommendation:    [x] Discharge to HEP. Follow up with PT or MD PRN.             Physical Therapy: TREATMENT/PROGRESS NOTE   Patient: Jovanny Morales (73 y.o. male)   Treatment Date: 2024   :  1950 MRN: [id number]   Visit #: 31   Insurance Allowable Auth Needed   BMN []Yes    [x]No    Insurance: Payor: [facility] MEDICARE / Plan: [facility] MEDICARE ADVANTAGE / Product Type: *No Product type* /   Insurance ID: [id number] - (Medicare Managed)  Secondary Insurance (if applicable):    Treatment Diagnosis:     ICD-10-CM    1. Right knee pain, unspecified chronicity  M25.561       2. Difficulty walking  R26.2          Medical Diagnosis:    Nontraumatic rupture of right quadriceps tendon  Eye Shield Used: No

## 2024-04-16 NOTE — TELEPHONE ENCOUNTER
Procedure:  RADHA/CV  Doctor:  Dr. Carbajal  Date:  5/24/24  Time:  10:30am  Arrival:  9am  Reps:  n/a  Anesthesia:  Yes      Spoke with patient. Please have patient arrive to the main entrance of [facility] ([address] [place] [id number]) and check in with the registration desk.  They will be directed to the Cath Lab.    Remind patient to be NPO after midnight (8 hours prior). Do not apply lotions/creams on skin the day of procedure.    [facility]

## 2024-05-28 NOTE — TELEPHONE ENCOUNTER
Pt states he is no longer taking hyzaar. Pt states he is now taking two generic pills. Pt would like to know if he should stop both of those pills. Please advise.

## 2024-05-28 NOTE — TELEPHONE ENCOUNTER
Called and spoke with patient. He states they split losartan and HCTZ into two separate pills. Informed him to take losartan and hold HCTZ the morning of. Of note, patient also on amiloride, and was instructed to hold this medication as well. Patient gave V/U and had no further questions.

## 2024-05-31 ENCOUNTER — ANCILLARY PROCEDURE (OUTPATIENT)
Dept: CARDIOLOGY CLINIC | Age: 74
End: 2024-05-31
Payer: MEDICARE

## 2024-05-31 ENCOUNTER — APPOINTMENT (OUTPATIENT)
Dept: CARDIAC CATH/INVASIVE PROCEDURES | Age: 74
End: 2024-05-31
Attending: INTERNAL MEDICINE
Payer: MEDICARE

## 2024-05-31 ENCOUNTER — ANESTHESIA (OUTPATIENT)
Dept: CARDIAC CATH/INVASIVE PROCEDURES | Age: 74
End: 2024-05-31
Payer: MEDICARE

## 2024-05-31 ENCOUNTER — HOSPITAL ENCOUNTER (OUTPATIENT)
Dept: CARDIAC CATH/INVASIVE PROCEDURES | Age: 74
Discharge: HOME | End: 2024-05-31
Attending: INTERNAL MEDICINE | Admitting: INTERNAL MEDICINE
Payer: MEDICARE

## 2024-05-31 ENCOUNTER — TELEPHONE (OUTPATIENT)
Dept: CARDIOLOGY CLINIC | Age: 74
End: 2024-05-31

## 2024-05-31 ENCOUNTER — ANESTHESIA EVENT (OUTPATIENT)
Dept: CARDIAC CATH/INVASIVE PROCEDURES | Age: 74
End: 2024-05-31
Payer: MEDICARE

## 2024-05-31 VITALS
RESPIRATION RATE: 12 BRPM | HEART RATE: 54 BPM | OXYGEN SATURATION: 100 % | DIASTOLIC BLOOD PRESSURE: 69 MMHG | SYSTOLIC BLOOD PRESSURE: 111 MMHG

## 2024-05-31 DIAGNOSIS — I48.91 ATRIAL FIBRILLATION, UNSPECIFIED TYPE (HCC): Primary | ICD-10-CM

## 2024-05-31 DIAGNOSIS — I48.91 ATRIAL FIBRILLATION, UNSPECIFIED TYPE (HCC): ICD-10-CM

## 2024-05-31 PROBLEM — I48.19 PERSISTENT ATRIAL FIBRILLATION (HCC): Status: ACTIVE | Noted: 2023-11-17

## 2024-05-31 LAB
ANION GAP SERPL CALCULATED.3IONS-SCNC: 10 MMOL/L (ref 3–16)
BUN SERPL-MCNC: 23 MG/DL (ref 7–20)
CALCIUM SERPL-MCNC: 9.5 MG/DL (ref 8.3–10.6)
CHLORIDE SERPL-SCNC: 100 MMOL/L (ref 99–110)
CO2 SERPL-SCNC: 27 MMOL/L (ref 21–32)
CREAT SERPL-MCNC: 0.9 MG/DL (ref 0.8–1.3)
DEPRECATED RDW RBC AUTO: 14.2 % (ref 12.4–15.4)
EKG ATRIAL RATE: 57 BPM
EKG ATRIAL RATE: 59 BPM
EKG DIAGNOSIS: NORMAL
EKG DIAGNOSIS: NORMAL
EKG P AXIS: 17 DEGREES
EKG P-R INTERVAL: 280 MS
EKG Q-T INTERVAL: 452 MS
EKG Q-T INTERVAL: 462 MS
EKG QRS DURATION: 88 MS
EKG QRS DURATION: 94 MS
EKG QTC CALCULATION (BAZETT): 441 MS
EKG QTC CALCULATION (BAZETT): 447 MS
EKG R AXIS: -11 DEGREES
EKG R AXIS: -4 DEGREES
EKG T AXIS: 11 DEGREES
EKG T AXIS: 3 DEGREES
EKG VENTRICULAR RATE: 55 BPM
EKG VENTRICULAR RATE: 59 BPM
GFR SERPLBLD CREATININE-BSD FMLA CKD-EPI: 90 ML/MIN/{1.73_M2}
GLUCOSE SERPL-MCNC: 101 MG/DL (ref 70–99)
HCT VFR BLD AUTO: 40.3 % (ref 40.5–52.5)
HGB BLD-MCNC: 13.6 G/DL (ref 13.5–17.5)
MCH RBC QN AUTO: 31 PG (ref 26–34)
MCHC RBC AUTO-ENTMCNC: 33.8 G/DL (ref 31–36)
MCV RBC AUTO: 91.6 FL (ref 80–100)
PLATELET # BLD AUTO: 204 K/UL (ref 135–450)
PMV BLD AUTO: 7.7 FL (ref 5–10.5)
POTASSIUM SERPL-SCNC: 4.9 MMOL/L (ref 3.5–5.1)
RBC # BLD AUTO: 4.4 M/UL (ref 4.2–5.9)
SODIUM SERPL-SCNC: 137 MMOL/L (ref 136–145)
WBC # BLD AUTO: 6.7 K/UL (ref 4–11)

## 2024-05-31 PROCEDURE — 6360000002 HC RX W HCPCS: Performed by: NURSE ANESTHETIST, CERTIFIED REGISTERED

## 2024-05-31 PROCEDURE — 80048 BASIC METABOLIC PNL TOTAL CA: CPT

## 2024-05-31 PROCEDURE — 2500000003 HC RX 250 WO HCPCS: Performed by: NURSE ANESTHETIST, CERTIFIED REGISTERED

## 2024-05-31 PROCEDURE — 7100000010 HC PHASE II RECOVERY - FIRST 15 MIN: Performed by: INTERNAL MEDICINE

## 2024-05-31 PROCEDURE — 93270 REMOTE 30 DAY ECG REV/REPORT: CPT | Performed by: INTERNAL MEDICINE

## 2024-05-31 PROCEDURE — 93320 DOPPLER ECHO COMPLETE: CPT | Performed by: INTERNAL MEDICINE

## 2024-05-31 PROCEDURE — 7100000011 HC PHASE II RECOVERY - ADDTL 15 MIN: Performed by: INTERNAL MEDICINE

## 2024-05-31 PROCEDURE — 3700000001 HC ADD 15 MINUTES (ANESTHESIA): Performed by: INTERNAL MEDICINE

## 2024-05-31 PROCEDURE — 93005 ELECTROCARDIOGRAM TRACING: CPT | Performed by: INTERNAL MEDICINE

## 2024-05-31 PROCEDURE — 92960 CARDIOVERSION ELECTRIC EXT: CPT | Performed by: INTERNAL MEDICINE

## 2024-05-31 PROCEDURE — 93312 ECHO TRANSESOPHAGEAL: CPT | Performed by: INTERNAL MEDICINE

## 2024-05-31 PROCEDURE — 85027 COMPLETE CBC AUTOMATED: CPT

## 2024-05-31 PROCEDURE — 93325 DOPPLER ECHO COLOR FLOW MAPG: CPT | Performed by: INTERNAL MEDICINE

## 2024-05-31 PROCEDURE — 93312 ECHO TRANSESOPHAGEAL: CPT

## 2024-05-31 PROCEDURE — 3700000000 HC ANESTHESIA ATTENDED CARE: Performed by: INTERNAL MEDICINE

## 2024-05-31 RX ORDER — SODIUM CHLORIDE 9 MG/ML
INJECTION, SOLUTION INTRAVENOUS PRN
Status: DISCONTINUED | OUTPATIENT
Start: 2024-05-31 | End: 2024-05-31 | Stop reason: HOSPADM

## 2024-05-31 RX ORDER — SODIUM CHLORIDE 0.9 % (FLUSH) 0.9 %
5-40 SYRINGE (ML) INJECTION EVERY 12 HOURS SCHEDULED
Status: DISCONTINUED | OUTPATIENT
Start: 2024-05-31 | End: 2024-05-31 | Stop reason: HOSPADM

## 2024-05-31 RX ORDER — LIDOCAINE HYDROCHLORIDE 20 MG/ML
INJECTION, SOLUTION INFILTRATION; PERINEURAL PRN
Status: DISCONTINUED | OUTPATIENT
Start: 2024-05-31 | End: 2024-05-31 | Stop reason: SDUPTHER

## 2024-05-31 RX ORDER — SODIUM CHLORIDE 0.9 % (FLUSH) 0.9 %
5-40 SYRINGE (ML) INJECTION PRN
Status: DISCONTINUED | OUTPATIENT
Start: 2024-05-31 | End: 2024-05-31 | Stop reason: HOSPADM

## 2024-05-31 RX ORDER — PROPOFOL 10 MG/ML
INJECTION, EMULSION INTRAVENOUS PRN
Status: DISCONTINUED | OUTPATIENT
Start: 2024-05-31 | End: 2024-05-31 | Stop reason: SDUPTHER

## 2024-05-31 RX ADMIN — PROPOFOL 50 MG: 10 INJECTION, EMULSION INTRAVENOUS at 11:42

## 2024-05-31 RX ADMIN — PROPOFOL 100 MG: 10 INJECTION, EMULSION INTRAVENOUS at 11:35

## 2024-05-31 RX ADMIN — LIDOCAINE HYDROCHLORIDE 50 MG: 20 INJECTION, SOLUTION INFILTRATION; PERINEURAL at 11:35

## 2024-05-31 ASSESSMENT — ENCOUNTER SYMPTOMS
SLEEP DISTURBANCES DUE TO BREATHING: 1
SHORTNESS OF BREATH: 0

## 2024-05-31 NOTE — ANESTHESIA PRE PROCEDURE
Department of Anesthesiology  Preprocedure Note       Name:  Jovanny Morales   Age:  73 y.o.  :  1950                                          MRN:  [id number]         Date:  2024      Surgeon: * No surgeons listed *    Procedure: * No procedures listed *    Medications prior to admission:   Prior to Admission medications    Medication Sig Start Date End Date Taking? Authorizing Provider   apixaban (ELIQUIS) 5 MG TABS tablet Take 1 tablet by mouth 2 times daily 24   Patrick Carbajal MD   metoprolol succinate (TOPROL XL) 50 MG extended release tablet Take 0.5 tablets by mouth daily 4/3/24   Patrick Carbajal MD   amiodarone (CORDARONE) 200 MG tablet Take 1 tablet by mouth daily 4/3/24   Patrick Carbajal MD   diclofenac (VOLTAREN) 50 MG EC tablet Take 1 tablet by mouth 2 times daily 24   Mayur oTledo MD   Multiple Vitamins-Minerals (THERAPEUTIC MULTIVITAMIN-MINERALS) tablet Take 1 tablet by mouth daily    Wily Camara MD   losartan-hydrochlorothiazide (HYZAAR) 100-25 MG per tablet Take 1 tablet by mouth daily 17   Wily Camara MD   omeprazole (PRILOSEC) 40 MG delayed release capsule Take 1 capsule by mouth daily 17   Wily Camara MD   doxazosin (CARDURA) 2 MG tablet Take 1 tablet by mouth nightly 17   Wily Camara MD   citalopram (CELEXA) 20 MG tablet Take 1 tablet by mouth nightly 17   Wily Camara MD   aMILoride (MIDAMOR) 5 MG tablet Take 1 tablet by mouth 2 times daily    Wily Camara MD   ascorbic acid (VITAMIN C) 1000 MG tablet Take 1 tablet by mouth daily    Wily Camara MD       Current medications:    Current Facility-Administered Medications   Medication Dose Route Frequency Provider Last Rate Last Admin   • sodium chloride flush 0.9 % injection 5-40 mL  5-40 mL IntraVENous 2 times per day Patrick Carbajal MD       • sodium chloride flush 0.9 % injection 5-40 mL  5-40 mL IntraVENous PRN Solomon

## 2024-05-31 NOTE — H&P
Cardiac Electrophysiology H&P Note      Assessment:     1. Atrial fibrillation: patient had first documented episode of atrial fibrillation (November 2023).     Associated symptoms: None      History of cardioversion: No prior history of cardioversion  History of AF ablation: No history of atrial fibrillation ablation in the past  History of heart surgery/procedure: no  History of other cardiac arrhythmias: no     Current use of anti-arrhythmic drugs: Not currently on anti-arrhythmic drugs  Previous other use of anti-arrhythmic drugs: Not previously on any anti-arrhythmic drugs     Overall LV function: Normal left ventricular systolic function  Size of left atrium: Normal LA size  Significant cardiac valvular disease: No significant valve disease     Family history of atrial fibrillation: No     Alcohol consumption: Moderate alcohol consumption  Caffeine consumption: No/minimal caffeine intake  Smoking status: non-smoker  Obstructive sleep apnea: On CPAP/BiPAP therapy  Exercise status: Mild exercise routine     I had a discussion with the patient about issues related to atrial fibrillation (including etiology, disease progression patterns, stroke risk and rate control issues). Patient had his questions answered to his satisfaction.       Patient has a [id number] score of 2 [Age over 65 (1 point) and Hypertension (1 point)]  Longterm anticoagulation is: recommended  Current anticoagulation: apixaban  Bleeding issues reported: no  Renal function: Normal renal function  Thyroid function:  no TSH on file     Patient diagnosed with atrial fibrillation in November 2023 as he was being prepared for orthopedic surgery.  He did not feel any palpitations. It was felt likely that his arrhythmia had been ongoing for some time with good rate control on beta-blocker therapy.  No evidence of volume overload.  Hemodynamically stable.  Recently diagnosed with sleep apnea which is likely responsible, at least in part, for

## 2024-05-31 NOTE — ANESTHESIA POSTPROCEDURE EVALUATION
Department of Anesthesiology  Postprocedure Note    Patient: Jovanny Morales  MRN: [id number]  YOB: 1950  Date of evaluation: 5/31/2024    Procedure Summary       Date: 05/31/24 Room / Location: [facility]    Anesthesia Start: 1132 Anesthesia Stop: 1148    Procedure: RADHA W/ POSSIBLE CARDIOVERSION (PRN CONTRAST/BUBBLE/3D) Diagnosis: Atrial fibrillation, unspecified type (HCC)    Scheduled Providers: Marino Kovacs MD Responsible Provider: Gio Pedroza MD    Anesthesia Type: MAC ASA Status: 3            Anesthesia Type: No value filed.    Verónica Phase I:      Verónica Phase II:      Anesthesia Post Evaluation    Patient location during evaluation: PACU  Patient participation: complete - patient participated  Level of consciousness: awake and alert  Airway patency: patent  Nausea & Vomiting: no vomiting and no nausea  Cardiovascular status: hemodynamically stable and blood pressure returned to baseline  Respiratory status: acceptable  Hydration status: euvolemic  Comments: --------------------            05/31/24               1125     --------------------   BP:        123/76       Pulse:       76      Resp:        14     SpO2:      100%     --------------------    Pain management: adequate    No notable events documented.

## 2024-05-31 NOTE — ANESTHESIA POSTPROCEDURE EVALUATION
Department of Anesthesiology  Postprocedure Note    Patient: Jovanny Morales  MRN: [id number]  YOB: 1950  Date of evaluation: 5/31/2024    Procedure Summary       Date: 05/31/24 Room / Location: [facility]    Anesthesia Start:  Anesthesia Stop:     Procedure: RADHA W/ POSSIBLE CARDIOVERSION (PRN CONTRAST/BUBBLE/3D) Diagnosis: Atrial fibrillation, unspecified type (HCC)    Scheduled Providers: Marino Kovacs MD Responsible Provider:     Anesthesia Type: MAC ASA Status: 3            Anesthesia Type: No value filed.    Verónica Phase I:      Verónica Phase II:      Anesthesia Post Evaluation    Patient location during evaluation: PACU  Patient participation: complete - patient participated  Level of consciousness: awake and alert  Airway patency: patent  Nausea & Vomiting: no vomiting and no nausea  Cardiovascular status: hemodynamically stable and blood pressure returned to baseline  Respiratory status: acceptable  Hydration status: euvolemic  Comments: --------------------            05/31/24               1029     --------------------   BP:       105/72     Pulse:      58       Resp:       16       SpO2:      97%      --------------------    Pain management: adequate    No notable events documented.

## 2024-05-31 NOTE — PROGRESS NOTES
Patient/family given discharge instructions. Patient/family verbalize understanding of discharge instructions, all questions addressed, copy given to patient/family. Pt transferred to vehicle via wheelchair to be discharged home with family.     Electronically signed by Ruth Yañez RN on 5/31/2024 at 2:43 PM

## 2024-05-31 NOTE — TELEPHONE ENCOUNTER
Monitor company [employer]  Length of monitor 14 days  Monitor ordered by Dr. Carbajal  Patch ID [id number]  Device number [id number]  Monitor given to Placed by this RN.   Nurse to apply at the time of discharge.

## 2024-05-31 NOTE — ANESTHESIA PRE PROCEDURE
Department of Anesthesiology  Preprocedure Note       Name:  Jovanny Morales   Age:  73 y.o.  :  1950                                          MRN:  [id number]         Date:  2024      Surgeon: * No surgeons listed *    Procedure: * No procedures listed *    Medications prior to admission:   Prior to Admission medications    Medication Sig Start Date End Date Taking? Authorizing Provider   apixaban (ELIQUIS) 5 MG TABS tablet Take 1 tablet by mouth 2 times daily 24   Patrick Carbajal MD   metoprolol succinate (TOPROL XL) 50 MG extended release tablet Take 0.5 tablets by mouth daily 4/3/24   Patrick Carbajal MD   amiodarone (CORDARONE) 200 MG tablet Take 1 tablet by mouth daily 4/3/24   Patrick Carbajal MD   diclofenac (VOLTAREN) 50 MG EC tablet Take 1 tablet by mouth 2 times daily 24   Mayur Toledo MD   Multiple Vitamins-Minerals (THERAPEUTIC MULTIVITAMIN-MINERALS) tablet Take 1 tablet by mouth daily    Wily Camara MD   losartan-hydrochlorothiazide (HYZAAR) 100-25 MG per tablet Take 1 tablet by mouth daily 17   Wily Camara MD   omeprazole (PRILOSEC) 40 MG delayed release capsule Take 1 capsule by mouth daily 17   Wily Camara MD   doxazosin (CARDURA) 2 MG tablet Take 1 tablet by mouth nightly 17   Wily Camara MD   citalopram (CELEXA) 20 MG tablet Take 1 tablet by mouth nightly 17   Wily Camara MD   aMILoride (MIDAMOR) 5 MG tablet Take 1 tablet by mouth 2 times daily    Wily Camara MD   ascorbic acid (VITAMIN C) 1000 MG tablet Take 1 tablet by mouth daily    Wily Camara MD       Current medications:    No current facility-administered medications for this encounter.       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) I48.91   • ELIZABETH (obstructive sleep apnea) G47.33   • Essential hypertension I10   • Obesity (BMI 30.0-34.9) E66.9       Past Medical History:        Diagnosis Date   • A-fib (HCC) 11/17/2023   • AAA (abdominal aortic aneurysm) ([facility])    • GERD (gastroesophageal reflux disease)     mild intermittent   • Hypertension        Past Surgical History:        Procedure Laterality Date   • COLONOSCOPY     • JOINT REPLACEMENT      left knee   • KNEE ARTHROSCOPY      one on right, two on left   • LEG MUSCLE SURGERY Right 11/21/2023    RIGHT QUADRICEPS TENDON REPAIR NOTE: ADDUCTOR CANAL BLOCK performed by Mayur Toledo MD at [facility] OR   • REVISION TOTAL KNEE ARTHROPLASTY Left 6/4/2019    REVISION LEFT PATELLAR BUTTON performed by Cameron Shea MD at [facility] OR   • SINUS SURGERY  1980       Social History:    Social History     Tobacco Use   • Smoking status: Never   • Smokeless tobacco: Never   Substance Use Topics   • Alcohol use: Yes     Alcohol/week: 7.0 standard drinks of alcohol     Types: 7 Glasses of wine per week     Comment: 1-2 glasses wine daily                                Counseling given: Not Answered      Vital Signs (Current): There were no vitals filed for this visit.                                           BP Readings from Last 3 Encounters:   04/03/24 118/70   01/15/24 114/68   11/21/23 117/89       NPO Status:                                                                                 BMI:   Wt Readings from Last 3 Encounters:   04/03/24 110 kg (242 lb 9.6 oz)   01/26/24 109.8 kg (242 lb)   01/15/24 110 kg (242 lb

## 2024-05-31 NOTE — PROGRESS NOTES
Discharged home, verified d/ instructions, iv removed and franki rn placed holter monitor. Patient denies questions home with brother as .  Electronically signed by Ruth Yañez RN on 5/31/2024 at 1:18 PM

## 2024-06-19 PROCEDURE — 93272 ECG/REVIEW INTERPRET ONLY: CPT | Performed by: INTERNAL MEDICINE

## 2024-06-24 DIAGNOSIS — I48.91 NEW ONSET A-FIB (HCC): ICD-10-CM

## 2024-06-24 RX ORDER — AMIODARONE HYDROCHLORIDE 200 MG/1
200 TABLET ORAL DAILY
Qty: 90 TABLET | Refills: 1 | Status: SHIPPED | OUTPATIENT
Start: 2024-06-24

## 2024-07-02 ASSESSMENT — ENCOUNTER SYMPTOMS
LEFT EYE: 0
STRIDOR: 0
SHORTNESS OF BREATH: 0
HEMATEMESIS: 0
HEMATOCHEZIA: 0
WHEEZING: 0
RIGHT EYE: 0

## 2024-07-02 NOTE — PROGRESS NOTES
Highest education level: Not on file   Occupational History    Not on file   Tobacco Use    Smoking status: Never    Smokeless tobacco: Never   Vaping Use    Vaping Use: Never used   Substance and Sexual Activity    Alcohol use: Yes     Alcohol/week: 7.0 standard drinks of alcohol     Types: 7 Glasses of wine per week     Comment: 1-2 glasses wine daily    Drug use: No    Sexual activity: Not on file   Other Topics Concern    Not on file   Social History Narrative    Not on file     Social Determinants of Health     Financial Resource Strain: Not on file   Food Insecurity: Not on file   Transportation Needs: Not on file   Physical Activity: Not on file   Stress: Not on file   Social Connections: Not on file   Intimate Partner Violence: Not on file   Housing Stability: Not on file     Family History   Problem Relation Age of Onset    Rheum Arthritis Mother     High Blood Pressure Father     Cancer Father         throat     Objective:     /60   Pulse 61   Ht 1.829 m (6' 0.01\")   Wt 110.4 kg (243 lb 6.4 oz)   SpO2 97%   BMI 33.00 kg/m²     Wt Readings from Last 3 Encounters:   07/03/24 110.4 kg (243 lb 6.4 oz)   04/03/24 110 kg (242 lb 9.6 oz)   01/26/24 109.8 kg (242 lb)     Physical Exam  Constitutional:       Appearance: Normal appearance.   HENT:      Head: Normocephalic and atraumatic.      Nose: Nose normal. No rhinorrhea.   Eyes:      General: No scleral icterus.     Conjunctiva/sclera: Conjunctivae normal.   Cardiovascular:      Rate and Rhythm: Normal rate and regular rhythm.   Pulmonary:      Effort: Pulmonary effort is normal.      Breath sounds: Normal breath sounds.   Abdominal:      General: There is no distension.   Musculoskeletal:      Cervical back: Normal range of motion and neck supple.      Right knee: Decreased range of motion.      Right lower leg: No edema.      Left lower leg: No edema.   Skin:     General: Skin is warm and dry.   Neurological:      General: No focal deficit present.

## 2024-07-03 ENCOUNTER — OFFICE VISIT (OUTPATIENT)
Dept: CARDIOLOGY CLINIC | Age: 74
End: 2024-07-03
Payer: MEDICARE

## 2024-07-03 VITALS
SYSTOLIC BLOOD PRESSURE: 110 MMHG | WEIGHT: 243.4 LBS | DIASTOLIC BLOOD PRESSURE: 60 MMHG | OXYGEN SATURATION: 97 % | BODY MASS INDEX: 32.97 KG/M2 | HEIGHT: 72 IN | HEART RATE: 61 BPM

## 2024-07-03 DIAGNOSIS — Z51.81 ENCOUNTER FOR MONITORING AMIODARONE THERAPY: ICD-10-CM

## 2024-07-03 DIAGNOSIS — E66.9 OBESITY (BMI 30.0-34.9): ICD-10-CM

## 2024-07-03 DIAGNOSIS — I48.91 NEW ONSET A-FIB (HCC): ICD-10-CM

## 2024-07-03 DIAGNOSIS — G47.33 OSA (OBSTRUCTIVE SLEEP APNEA): ICD-10-CM

## 2024-07-03 DIAGNOSIS — I71.20 THORACIC AORTIC ANEURYSM WITHOUT RUPTURE, UNSPECIFIED PART (HCC): ICD-10-CM

## 2024-07-03 DIAGNOSIS — I48.19 PERSISTENT ATRIAL FIBRILLATION (HCC): Primary | ICD-10-CM

## 2024-07-03 DIAGNOSIS — Z79.899 ENCOUNTER FOR MONITORING AMIODARONE THERAPY: ICD-10-CM

## 2024-07-03 DIAGNOSIS — I71.40 ABDOMINAL AORTIC ANEURYSM (AAA) WITHOUT RUPTURE, UNSPECIFIED PART (HCC): ICD-10-CM

## 2024-07-03 PROCEDURE — 93000 ELECTROCARDIOGRAM COMPLETE: CPT | Performed by: INTERNAL MEDICINE

## 2024-07-03 PROCEDURE — 99214 OFFICE O/P EST MOD 30 MIN: CPT | Performed by: INTERNAL MEDICINE

## 2024-07-03 PROCEDURE — 3078F DIAST BP <80 MM HG: CPT | Performed by: INTERNAL MEDICINE

## 2024-07-03 PROCEDURE — 1123F ACP DISCUSS/DSCN MKR DOCD: CPT | Performed by: INTERNAL MEDICINE

## 2024-07-03 PROCEDURE — 3074F SYST BP LT 130 MM HG: CPT | Performed by: INTERNAL MEDICINE

## 2024-07-03 RX ORDER — AMIODARONE HYDROCHLORIDE 200 MG/1
200 TABLET ORAL SEE ADMIN INSTRUCTIONS
Qty: 90 TABLET | Refills: 1 | Status: SHIPPED
Start: 2024-07-03

## 2024-07-03 NOTE — PATIENT INSTRUCTIONS
Your provider has ordered testing for further evaluation.  An order/prescription has been included in your paper work.   To schedule outpatient testing, contact Central Scheduling by calling [facility] ([phone]).

## 2024-07-05 ENCOUNTER — TELEPHONE (OUTPATIENT)
Dept: CARDIOLOGY CLINIC | Age: 74
End: 2024-07-05

## 2024-07-05 DIAGNOSIS — I71.9 AORTIC ANEURYSM, UNSPECIFIED PORTION OF AORTA, UNSPECIFIED WHETHER RUPTURED (HCC): Primary | ICD-10-CM

## 2024-07-05 DIAGNOSIS — I48.19 PERSISTENT ATRIAL FIBRILLATION (HCC): ICD-10-CM

## 2024-07-05 NOTE — TELEPHONE ENCOUNTER
Spoke with patient-EP would recommend ASAP, according to findings to be determined. Provider performing to ultimately device when OK to resume. Patient gave V/U

## 2024-07-05 NOTE — TELEPHONE ENCOUNTER
PT contacted office asking when he can resume elequis after coloscopy, pt states clx was given on OV w/ KXA 07/03. Please advise

## 2024-07-05 NOTE — TELEPHONE ENCOUNTER
According to KXA note- CT chest angiogram (CTA)--ordered appropriate testing after confirming with NPAM

## 2024-07-05 NOTE — TELEPHONE ENCOUNTER
7/5- called pt @[phone]. [facility] informing pt to call back. Pt is currently scheduled to have CT done however that appt needs to be canceled as a CTA is needed. Per RNEW CTA is ordered and CTA is confirmed with NPAM. Pt needs to call central scheduling.

## 2024-07-05 NOTE — TELEPHONE ENCOUNTER
CS contacted office requesting order clarification on CT. CS would like to know if it is for CT chest or a CTA. According to CT dept KXA note states CTA, but order states CT.

## 2024-07-05 NOTE — TELEPHONE ENCOUNTER
PT contacted office in regards to another matter (separate encounter), informed pt of this message. PT states CS contacted him to get corrected. CTA scheduled.

## 2024-07-09 NOTE — TELEPHONE ENCOUNTER
Central Scheduling returned call. CS states order needs to be CTA chest w Contrast not CTA cardiac. If there any questions call back number is [phone] ext- [phone]. Please advise.

## 2024-07-09 NOTE — TELEPHONE ENCOUNTER
7/9- called pt @[phone]. [facility] informing pt to call back. Pt needs corrected order to be scheduled.

## 2024-07-12 ENCOUNTER — HOSPITAL ENCOUNTER (OUTPATIENT)
Age: 74
Discharge: HOME | End: 2024-07-12
Payer: MEDICARE

## 2024-07-12 ENCOUNTER — HOSPITAL ENCOUNTER (OUTPATIENT)
Dept: CT IMAGING | Age: 74
Discharge: HOME | End: 2024-07-12
Attending: INTERNAL MEDICINE
Payer: MEDICARE

## 2024-07-12 DIAGNOSIS — I48.19 PERSISTENT ATRIAL FIBRILLATION (HCC): ICD-10-CM

## 2024-07-12 DIAGNOSIS — I71.9 AORTIC ANEURYSM, UNSPECIFIED PORTION OF AORTA, UNSPECIFIED WHETHER RUPTURED (HCC): ICD-10-CM

## 2024-07-12 LAB
ALBUMIN SERPL-MCNC: 4.1 G/DL (ref 3.4–5)
ALBUMIN/GLOB SERPL: 1.4 {RATIO} (ref 1.1–2.2)
ALP SERPL-CCNC: 76 U/L (ref 40–129)
ALT SERPL-CCNC: 21 U/L (ref 10–40)
ANION GAP SERPL CALCULATED.3IONS-SCNC: 10 MMOL/L (ref 3–16)
AST SERPL-CCNC: 23 U/L (ref 15–37)
BILIRUB SERPL-MCNC: 0.5 MG/DL (ref 0–1)
BUN SERPL-MCNC: 25 MG/DL (ref 7–20)
CALCIUM SERPL-MCNC: 9 MG/DL (ref 8.3–10.6)
CHLORIDE SERPL-SCNC: 101 MMOL/L (ref 99–110)
CO2 SERPL-SCNC: 27 MMOL/L (ref 21–32)
CREAT SERPL-MCNC: 1 MG/DL (ref 0.8–1.3)
GFR SERPLBLD CREATININE-BSD FMLA CKD-EPI: 79 ML/MIN/{1.73_M2}
GLUCOSE SERPL-MCNC: 106 MG/DL (ref 70–99)
POTASSIUM SERPL-SCNC: 4.3 MMOL/L (ref 3.5–5.1)
PROT SERPL-MCNC: 7 G/DL (ref 6.4–8.2)
SODIUM SERPL-SCNC: 138 MMOL/L (ref 136–145)
TSH SERPL DL<=0.005 MIU/L-ACNC: 2.5 UIU/ML (ref 0.27–4.2)

## 2024-07-12 PROCEDURE — 71275 CT ANGIOGRAPHY CHEST: CPT

## 2024-07-12 PROCEDURE — 36415 COLL VENOUS BLD VENIPUNCTURE: CPT

## 2024-07-12 PROCEDURE — 84443 ASSAY THYROID STIM HORMONE: CPT

## 2024-07-12 PROCEDURE — 6360000004 HC RX CONTRAST MEDICATION: Performed by: INTERNAL MEDICINE

## 2024-07-12 PROCEDURE — 80053 COMPREHEN METABOLIC PANEL: CPT

## 2024-07-12 RX ADMIN — IOPAMIDOL 75 ML: 755 INJECTION, SOLUTION INTRAVENOUS at 10:42

## 2024-07-18 ENCOUNTER — TELEPHONE (OUTPATIENT)
Dept: CARDIOLOGY CLINIC | Age: 74
End: 2024-07-18

## 2024-07-18 NOTE — TELEPHONE ENCOUNTER
Pt would like to know what the results of the cta are. He had the test completed on 07/12/24. Please advise.

## 2024-07-29 ENCOUNTER — TELEPHONE (OUTPATIENT)
Dept: CARDIOLOGY CLINIC | Age: 74
End: 2024-07-29

## 2024-08-06 ENCOUNTER — TELEPHONE (OUTPATIENT)
Dept: CARDIOLOGY CLINIC | Age: 74
End: 2024-08-06

## 2024-08-06 NOTE — TELEPHONE ENCOUNTER
Pt stated that he went to go get his refill of Eliquis and it has gone up to $630 for a 3m supply. The pharmacy advised the pt that his is in the donut hole. Pt would like to know what his options are. Please advise.

## 2024-08-06 NOTE — TELEPHONE ENCOUNTER
Patient was provided with # for Eliquis assistance.     If they are unable to get cost more affordable for patient, he was informed to speak with his insurance to see what the most cost effective alternative be for him. He gave V/U.

## 2024-08-07 NOTE — TELEPHONE ENCOUNTER
Pt states he was told that he would not meet qualifications for patient assistance. Pt would like to know what other medications could be prescribed and still be as affective. Please advise.

## 2024-08-07 NOTE — TELEPHONE ENCOUNTER
I spoke with the patient and advised him to call his insurance to see if Pradaxa 150 mg twice daily or Xarelto 20 mg daily would be covered. He will stay on Eliquis for now but he will call our office if he ends up wanting to switch.

## 2024-10-23 ENCOUNTER — TELEPHONE (OUTPATIENT)
Dept: CARDIOLOGY CLINIC | Age: 74
End: 2024-10-23

## 2024-10-23 DIAGNOSIS — I48.91 NEW ONSET A-FIB (HCC): Primary | ICD-10-CM

## 2024-10-23 NOTE — TELEPHONE ENCOUNTER
Pt called and is wondering if Dr. Carbajal would like for him to continue aminodarone. He would like to know before he is due for a refill. Please advise

## 2024-10-23 NOTE — TELEPHONE ENCOUNTER
LOV [facility] 7/3/2024. Per review of note, patient was to be taking amiodarone 200mg 5 days per week (Monday-Friday). Patient has a follow up appointment with [facility] 11/6/2024. [facility], at this time should patient continue amiodarone at current dosing frequency and discuss further at 11/6 f/u appointment?

## 2024-10-25 RX ORDER — AMIODARONE HYDROCHLORIDE 200 MG/1
200 TABLET ORAL SEE ADMIN INSTRUCTIONS
Qty: 90 TABLET | Refills: 1 | Status: SHIPPED | OUTPATIENT
Start: 2024-10-25

## 2024-10-25 NOTE — TELEPHONE ENCOUNTER
Patrick Carbajal MD  [facility] Ep10 hours ago (8:57 PM)     He is likely to remain on amiodarone but maybe at a lower dose. Depends on his upcoming office visit findings. Would continue with refill       Spoke with patient and relayed [facility] message. He V/U. Refill sent to preferred pharmacy.

## 2024-11-04 ASSESSMENT — ENCOUNTER SYMPTOMS
SHORTNESS OF BREATH: 0
HEMATOCHEZIA: 0
LEFT EYE: 0
RIGHT EYE: 0
STRIDOR: 0
HEMATEMESIS: 0
WHEEZING: 0

## 2024-11-04 NOTE — PROGRESS NOTES
Assessment:     1. Atrial fibrillation: patient had persistent atrial fibrillation (November 2023); now sinus rhythm post cardioversion     Associated symptoms: None      History of cardioversion: had cardioversion (May 2024)  History of AF ablation: No history of atrial fibrillation ablation in the past  History of heart surgery/procedure: no  History of other cardiac arrhythmias: no     Current use of anti-arrhythmic drugs: amiodarone  Previous other use of anti-arrhythmic drugs: Not previously on any anti-arrhythmic drugs     Overall LV function: Normal left ventricular systolic function  Size of left atrium: Normal LA size  Significant cardiac valvular disease: No significant valve disease     Family history of atrial fibrillation: No     Alcohol consumption: Moderate alcohol consumption  Caffeine consumption: No/minimal caffeine intake  Smoking status: non-smoker  Obstructive sleep apnea: On CPAP/BiPAP therapy  Exercise status: Mild exercise routine     I had a discussion with the patient about issues related to atrial fibrillation (including etiology, disease progression patterns, stroke risk and rate control issues). Patient had his questions answered to his satisfaction.       Patient has a [id number] score of 2 [Age over 65 (1 point) and Hypertension (1 point)]  Longterm anticoagulation is: recommended  Current anticoagulation: apixaban  Bleeding issues reported: no  Renal function: Normal renal function  Thyroid function:  no TSH on file     Patient diagnosed with atrial fibrillation in November 2023 as he was being prepared for orthopedic surgery.  He did not feel any palpitations. It was felt likely that his arrhythmia had been ongoing for some time with good rate control on beta-blocker therapy.  No evidence of volume overload.  Hemodynamically stable.  Recently diagnosed with sleep apnea which is likely responsible, at least in part, for predisposing him to atrial fibrillation. Good compliance with

## 2024-11-06 ENCOUNTER — OFFICE VISIT (OUTPATIENT)
Dept: CARDIOLOGY CLINIC | Age: 74
End: 2024-11-06

## 2024-11-06 VITALS
WEIGHT: 232 LBS | HEART RATE: 51 BPM | OXYGEN SATURATION: 100 % | DIASTOLIC BLOOD PRESSURE: 60 MMHG | HEIGHT: 72 IN | SYSTOLIC BLOOD PRESSURE: 112 MMHG | BODY MASS INDEX: 31.42 KG/M2

## 2024-11-06 DIAGNOSIS — Z51.81 ENCOUNTER FOR MONITORING AMIODARONE THERAPY: ICD-10-CM

## 2024-11-06 DIAGNOSIS — Z79.899 ENCOUNTER FOR MONITORING AMIODARONE THERAPY: ICD-10-CM

## 2024-11-06 DIAGNOSIS — E66.811 OBESITY (BMI 30.0-34.9): ICD-10-CM

## 2024-11-06 DIAGNOSIS — G47.33 OSA (OBSTRUCTIVE SLEEP APNEA): ICD-10-CM

## 2024-11-06 DIAGNOSIS — R00.1 BRADYCARDIA: ICD-10-CM

## 2024-11-06 DIAGNOSIS — I48.19 PERSISTENT ATRIAL FIBRILLATION (HCC): Primary | ICD-10-CM

## 2024-11-06 DIAGNOSIS — I48.91 NEW ONSET A-FIB (HCC): ICD-10-CM

## 2024-11-06 RX ORDER — AMIODARONE HYDROCHLORIDE 200 MG/1
200 TABLET ORAL SEE ADMIN INSTRUCTIONS
Qty: 90 TABLET | Refills: 1 | Status: SHIPPED | OUTPATIENT
Start: 2024-11-06

## 2024-11-15 NOTE — TELEPHONE ENCOUNTER
Last Office Visit: 11/6/2024 Provider: [facility]  Is provider OOT? No    Next Office Visit: 5/7/2025 Provider: [facility]  **If no OV, when does pt need to be seen?   **Has patient already had 30 day supply? No    LAST LABS:   CBC:   Lab Results   Component Value Date    WBC 6.7 05/31/2024    HGB 13.6 05/31/2024    HCT 40.3 (L) 05/31/2024    MCV 91.6 05/31/2024     05/31/2024     BMP:   Lab Results   Component Value Date/Time     07/12/2024 10:30 AM    K 4.3 07/12/2024 10:30 AM    K 4.9 05/31/2024 10:30 AM     07/12/2024 10:30 AM    CO2 27 07/12/2024 10:30 AM    BUN 25 07/12/2024 10:30 AM    CREATININE 1.0 07/12/2024 10:30 AM    GLUCOSE 106 07/12/2024 10:30 AM    CALCIUM 9.0 07/12/2024 10:30 AM    LABGLOM 79 07/12/2024 10:30 AM    LABGLOM >90 04/03/2024 01:04 PM            Is encounter provider correct?   Yes  Does refill dosage match last filled?   Yes  Changes to script from Tele Encounters or LOV Plan?   no  Did you adjust dispensed amount or refills to reflect last and upcoming OV?  Yes    Requested Prescriptions     Pending Prescriptions Disp Refills    apixaban (ELIQUIS) 5 MG TABS tablet 180 tablet 1     Sig: Take 1 tablet by mouth 2 times daily         
3

## 2024-11-29 ENCOUNTER — HOSPITAL ENCOUNTER (OUTPATIENT)
Age: 74
Discharge: HOME | End: 2024-11-29
Payer: MEDICARE

## 2024-11-29 DIAGNOSIS — I48.19 PERSISTENT ATRIAL FIBRILLATION (HCC): ICD-10-CM

## 2024-11-29 PROCEDURE — 84443 ASSAY THYROID STIM HORMONE: CPT

## 2024-11-29 PROCEDURE — 85025 COMPLETE CBC W/AUTO DIFF WBC: CPT

## 2024-11-29 PROCEDURE — 80053 COMPREHEN METABOLIC PANEL: CPT

## 2024-11-30 LAB
ALBUMIN SERPL-MCNC: 4.3 G/DL (ref 3.4–5)
ALBUMIN/GLOB SERPL: 1.7 {RATIO} (ref 1.1–2.2)
ALP SERPL-CCNC: 86 U/L (ref 40–129)
ALT SERPL-CCNC: 26 U/L (ref 10–40)
ANION GAP SERPL CALCULATED.3IONS-SCNC: 10 MMOL/L (ref 3–16)
AST SERPL-CCNC: 25 U/L (ref 15–37)
BASOPHILS # BLD: 0 K/UL (ref 0–0.2)
BASOPHILS NFR BLD: 0.9 %
BILIRUB SERPL-MCNC: 0.4 MG/DL (ref 0–1)
BUN SERPL-MCNC: 24 MG/DL (ref 7–20)
CALCIUM SERPL-MCNC: 9.1 MG/DL (ref 8.3–10.6)
CHLORIDE SERPL-SCNC: 104 MMOL/L (ref 99–110)
CO2 SERPL-SCNC: 26 MMOL/L (ref 21–32)
CREAT SERPL-MCNC: 1 MG/DL (ref 0.8–1.3)
DEPRECATED RDW RBC AUTO: 15.1 % (ref 12.4–15.4)
EOSINOPHIL # BLD: 0.2 K/UL (ref 0–0.6)
EOSINOPHIL NFR BLD: 3.7 %
GFR SERPLBLD CREATININE-BSD FMLA CKD-EPI: 79 ML/MIN/{1.73_M2}
GLUCOSE SERPL-MCNC: 97 MG/DL (ref 70–99)
HCT VFR BLD AUTO: 47.6 % (ref 40.5–52.5)
HGB BLD-MCNC: 15.4 G/DL (ref 13.5–17.5)
LYMPHOCYTES # BLD: 1.7 K/UL (ref 1–5.1)
LYMPHOCYTES NFR BLD: 34.4 %
MCH RBC QN AUTO: 30.6 PG (ref 26–34)
MCHC RBC AUTO-ENTMCNC: 32.4 G/DL (ref 31–36)
MCV RBC AUTO: 94.4 FL (ref 80–100)
MONOCYTES # BLD: 0.6 K/UL (ref 0–1.3)
MONOCYTES NFR BLD: 12 %
NEUTROPHILS # BLD: 2.4 K/UL (ref 1.7–7.7)
NEUTROPHILS NFR BLD: 49 %
PLATELET # BLD AUTO: 199 K/UL (ref 135–450)
PMV BLD AUTO: 9.2 FL (ref 5–10.5)
POTASSIUM SERPL-SCNC: 4.3 MMOL/L (ref 3.5–5.1)
PROT SERPL-MCNC: 6.8 G/DL (ref 6.4–8.2)
RBC # BLD AUTO: 5.04 M/UL (ref 4.2–5.9)
SODIUM SERPL-SCNC: 140 MMOL/L (ref 136–145)
TSH SERPL DL<=0.005 MIU/L-ACNC: 2.43 UIU/ML (ref 0.27–4.2)
WBC # BLD AUTO: 4.9 K/UL (ref 4–11)

## 2024-12-02 ENCOUNTER — TELEPHONE (OUTPATIENT)
Dept: CARDIOLOGY CLINIC | Age: 74
End: 2024-12-02

## 2024-12-20 ENCOUNTER — TELEPHONE (OUTPATIENT)
Dept: CARDIOLOGY CLINIC | Age: 74
End: 2024-12-20

## 2024-12-20 NOTE — TELEPHONE ENCOUNTER
Pt is on Eliquis and has arthritis, so he is wanting to know which topical medication can he use to get relief from arthritis. Please advise.

## 2024-12-26 NOTE — TELEPHONE ENCOUNTER
Carl Parr MD  [facility] Ep8 minutes ago (9:31 AM)       Topical medications such as Voltaren do have some systemic absorption and may be associated with increased bleeding risk.  He could use topical capsaicin cream.  He may also wish to check with his primary care physician or arthritis specialist.

## 2025-05-06 ASSESSMENT — ENCOUNTER SYMPTOMS
HEMATEMESIS: 0
WHEEZING: 0
RIGHT EYE: 0
STRIDOR: 0
SHORTNESS OF BREATH: 0
LEFT EYE: 0
HEMATOCHEZIA: 0

## 2025-05-06 NOTE — PROGRESS NOTES
Assessment:     1. Atrial fibrillation: patient had persistent atrial fibrillation (November 2023); now sinus rhythm post cardioversion     Associated symptoms: None      History of cardioversion: had cardioversion (May 2024)  History of AF ablation: No history of atrial fibrillation ablation in the past  History of heart surgery/procedure: no  History of other cardiac arrhythmias: no     Current use of anti-arrhythmic drugs: amiodarone  Previous other use of anti-arrhythmic drugs: Not previously on any anti-arrhythmic drugs     Overall LV function: Normal left ventricular systolic function  Size of left atrium: Normal LA size  Significant cardiac valvular disease: No significant valve disease     Family history of atrial fibrillation: No     Alcohol consumption: Moderate alcohol consumption  Caffeine consumption: No/minimal caffeine intake  Smoking status: non-smoker  Obstructive sleep apnea: On CPAP/BiPAP therapy  Exercise status: Mild exercise routine     I have had discussions with the patient about issues related to atrial fibrillation (including etiology, disease progression patterns, stroke risk and rate control issues). Patient had his questions answered to his satisfaction.       Patient has a [id number] score of 2 [Age over 65 (1 point) and Hypertension (1 point)]  Longterm anticoagulation is: recommended  Current anticoagulation: apixaban  Bleeding issues reported: no  Renal function: Normal renal function  Thyroid function:  no TSH on file     Patient diagnosed with atrial fibrillation in November 2023 as he was being prepared for orthopedic surgery.  He did not feel any palpitations. It was felt likely that his arrhythmia had been ongoing for some time with good rate control on beta-blocker therapy.  had no evidence of volume overload and was hemodynamically stable. Subsequently diagnosed with sleep apnea which is likely responsible, at least in part, for predisposing him to atrial fibrillation. Good

## 2025-05-07 ENCOUNTER — OFFICE VISIT (OUTPATIENT)
Dept: CARDIOLOGY CLINIC | Age: 75
End: 2025-05-07
Payer: MEDICARE

## 2025-05-07 VITALS
BODY MASS INDEX: 32.43 KG/M2 | OXYGEN SATURATION: 97 % | HEIGHT: 72 IN | SYSTOLIC BLOOD PRESSURE: 110 MMHG | HEART RATE: 50 BPM | WEIGHT: 239.42 LBS | DIASTOLIC BLOOD PRESSURE: 68 MMHG

## 2025-05-07 DIAGNOSIS — G47.33 OSA (OBSTRUCTIVE SLEEP APNEA): ICD-10-CM

## 2025-05-07 DIAGNOSIS — I71.40 ABDOMINAL AORTIC ANEURYSM (AAA) WITHOUT RUPTURE, UNSPECIFIED PART: ICD-10-CM

## 2025-05-07 DIAGNOSIS — Z51.81 ENCOUNTER FOR MONITORING AMIODARONE THERAPY: ICD-10-CM

## 2025-05-07 DIAGNOSIS — Z79.899 ENCOUNTER FOR MONITORING AMIODARONE THERAPY: ICD-10-CM

## 2025-05-07 DIAGNOSIS — I48.19 PERSISTENT ATRIAL FIBRILLATION (HCC): ICD-10-CM

## 2025-05-07 DIAGNOSIS — I48.0 PAF (PAROXYSMAL ATRIAL FIBRILLATION) (HCC): Primary | ICD-10-CM

## 2025-05-07 DIAGNOSIS — E66.811 OBESITY (BMI 30.0-34.9): ICD-10-CM

## 2025-05-07 DIAGNOSIS — R00.1 SINUS BRADYCARDIA: ICD-10-CM

## 2025-05-07 LAB
EKG DIAGNOSIS: NORMAL
EKG Q-T INTERVAL: 458 MS
EKG QRS DURATION: 76 MS
EKG QTC CALCULATION (BAZETT): 417 MS
EKG R AXIS: -49 DEGREES
EKG T AXIS: 224 DEGREES
EKG VENTRICULAR RATE: 50 BPM

## 2025-05-07 PROCEDURE — 1159F MED LIST DOCD IN RCRD: CPT | Performed by: INTERNAL MEDICINE

## 2025-05-07 PROCEDURE — G2211 COMPLEX E/M VISIT ADD ON: HCPCS | Performed by: INTERNAL MEDICINE

## 2025-05-07 PROCEDURE — 3074F SYST BP LT 130 MM HG: CPT | Performed by: INTERNAL MEDICINE

## 2025-05-07 PROCEDURE — 99214 OFFICE O/P EST MOD 30 MIN: CPT | Performed by: INTERNAL MEDICINE

## 2025-05-07 PROCEDURE — 1123F ACP DISCUSS/DSCN MKR DOCD: CPT | Performed by: INTERNAL MEDICINE

## 2025-05-07 PROCEDURE — 3078F DIAST BP <80 MM HG: CPT | Performed by: INTERNAL MEDICINE

## 2025-05-07 RX ORDER — ATORVASTATIN CALCIUM 20 MG/1
20 TABLET, FILM COATED ORAL DAILY
COMMUNITY
Start: 2025-04-28

## 2025-05-07 NOTE — PATIENT INSTRUCTIONS
Plan:     Referral to vascular surgery to manage aortic aneurysm.   Labs: TSH.   Chest x-ray for monitoring on Amiodarone.   Continue taking Metoprolol and Eliquis as prescribed.   Follow up with me in 6 months.

## 2025-05-13 ENCOUNTER — HOSPITAL ENCOUNTER (OUTPATIENT)
Dept: GENERAL RADIOLOGY | Age: 75
Discharge: HOME | End: 2025-05-13
Payer: MEDICARE

## 2025-05-13 ENCOUNTER — TELEPHONE (OUTPATIENT)
Dept: CARDIOLOGY CLINIC | Age: 75
End: 2025-05-13

## 2025-05-13 ENCOUNTER — HOSPITAL ENCOUNTER (OUTPATIENT)
Age: 75
Discharge: HOME | End: 2025-05-13
Payer: MEDICARE

## 2025-05-13 DIAGNOSIS — I48.19 PERSISTENT ATRIAL FIBRILLATION (HCC): ICD-10-CM

## 2025-05-13 LAB — TSH SERPL DL<=0.005 MIU/L-ACNC: 2.03 UIU/ML (ref 0.27–4.2)

## 2025-05-13 PROCEDURE — 71046 X-RAY EXAM CHEST 2 VIEWS: CPT

## 2025-05-13 PROCEDURE — 36415 COLL VENOUS BLD VENIPUNCTURE: CPT

## 2025-05-13 PROCEDURE — 84443 ASSAY THYROID STIM HORMONE: CPT

## 2025-05-13 NOTE — TELEPHONE ENCOUNTER
Pt presented himself at [facility]. The registrar called office to verify what testing needed to be ordered. Advised that pts active orders from [facility] are:    Labs: TSH.   Chest x-ray for monitoring on Amiodarone.     They v/u.

## 2025-05-14 ENCOUNTER — RESULTS FOLLOW-UP (OUTPATIENT)
Dept: CARDIOLOGY CLINIC | Age: 75
End: 2025-05-14

## 2025-05-20 ENCOUNTER — TELEPHONE (OUTPATIENT)
Dept: CARDIOLOGY CLINIC | Age: 75
End: 2025-05-20

## 2025-05-20 DIAGNOSIS — I71.40 ABDOMINAL AORTIC ANEURYSM (AAA) WITHOUT RUPTURE, UNSPECIFIED PART: Primary | ICD-10-CM

## 2025-05-20 NOTE — TELEPHONE ENCOUNTER
Other than Dr. Conti, who else would you refer to for AAA?     4. History of aortic aneurysm: last imagine in 2021. Repeat CT chest angiogram showed aortic root at 4.4 cm. Needs periodic imaging. Will refer to vascular surgery.

## 2025-05-20 NOTE — TELEPHONE ENCOUNTER
Pt called and stated he was referred to Dr Conti for his aneurysm.  Pt was told due to the location he needed a different doctor than Dr. Conti.  Pt would like a new referral to vascular surgery to manage aortic aneurysm. Please advise.

## 2025-05-21 NOTE — TELEPHONE ENCOUNTER
FREEMAN for patient. When he returns call please see what location he is needing to see a vascular surgeon in? And who told him he needs to see someone other than Dr. Conti? Thanks.

## 2025-05-21 NOTE — TELEPHONE ENCOUNTER
Pt called back spoke w/ Dr. Conti's office and pt was told that he needs to see Dr. Ruiz for thoracic surgery.  Pt wants to know if he can get a referral from [facility].

## 2025-05-21 NOTE — TELEPHONE ENCOUNTER
Patrick Carbajal MD  [facility] Ep8 hours ago (11:30 PM)       I'm not sure what location he is referring to. Where does he live. I am aware of a Dr. Diallo but don't know where he practices.

## 2025-05-21 NOTE — TELEPHONE ENCOUNTER
Pt called regarding vm left.  Pt was told by Dr. Conti's office that he needed to see another provider and that he didn't need a vascular surgeon.  Pt stated would call Dr. Conti's office to get clarification and call back.

## 2025-05-22 NOTE — TELEPHONE ENCOUNTER
Last Office Visit: 5/7/2025 Provider: [facility]  **Is provider OOT? No    Next Office Visit: 05/07/2025 Provider: [facility]    **Has patient already had 30 day supply? No    Lab orders needed? no   Encounter provider correct? Yes If not, change provider  Script changes since last refill? no    LAST LABS:     **Care Everywhere? yes - CBC & CMP 04/2025

## 2025-07-18 NOTE — FLOWSHEET NOTE
No   Total Timed Code Tx Minutes 42' 3  1     Total Treatment Minutes 57'      Charge Justification:  (57544) THERAPEUTIC EXERCISE - Provided verbal/tactile cueing for activities related to strengthening, flexibility, endurance, ROM performed to prevent loss of range of motion, maintain or improve muscular strength or increase flexibility, following either an injury or surgery.   (55173) HOME EXERCISE PROGRAM - Reviewed/Progressed HEP activities related to strengthening, flexibility, endurance, ROM performed to prevent loss of range of motion, maintain or improve muscular strength or increase flexibility, following either an injury or surgery.  (49736) NEUROMUSCULAR RE-EDUCATION - Therapeutic procedure, 1 or more areas, each 15 minutes; neuromuscular reeducation of movement, balance, coordination, kinesthetic sense, posture, and/or proprioception for sitting and/or standing activities  (80655) HOME EXERCISE PROGRAM - Reviewed/Progressed HEP activities related to neuromuscular reeducation of movement, balance, coordination, kinesthetic sense, posture, and/or proprioception for sitting and/or standing activities    (98142) MANUAL THERAPY -  Manual therapy techniques, 1 or more regions, each 15 minutes (Mobilization/manipulation, manual lymphatic drainage, manual traction) for the purpose of modulating pain, promoting relaxation,  increasing ROM, reducing/eliminating soft tissue swelling/inflammation/restriction, improving soft tissue extensibility and allowing for proper ROM for normal function with self care, mobility, lifting and ambulation  (06118) VASOPNEUMATIC    TREATMENT PLAN   Plan: Cont POC- Continue emphasis/focus on exercise progression, improving proper muscle recruitment and activation/motor control patterns, increasing ROM, improving soft tissue extensibility, and allowing for proper ROM. Next visit plan to progress reps, add new exercises, and adjust HEP  1-2x/wk for 4-6 more weeks from

## 2025-07-24 ENCOUNTER — HOSPITAL ENCOUNTER (OUTPATIENT)
Dept: CT IMAGING | Age: 75
Discharge: HOME | End: 2025-07-24
Attending: THORACIC SURGERY (CARDIOTHORACIC VASCULAR SURGERY)
Payer: MEDICARE

## 2025-07-24 ENCOUNTER — HOSPITAL ENCOUNTER (OUTPATIENT)
Dept: CARDIOLOGY | Age: 75
Discharge: HOME | End: 2025-07-26
Attending: THORACIC SURGERY (CARDIOTHORACIC VASCULAR SURGERY)
Payer: MEDICARE

## 2025-07-24 VITALS
SYSTOLIC BLOOD PRESSURE: 110 MMHG | WEIGHT: 244 LBS | DIASTOLIC BLOOD PRESSURE: 66 MMHG | HEIGHT: 72 IN | BODY MASS INDEX: 33.05 KG/M2

## 2025-07-24 DIAGNOSIS — I71.21 ANEURYSM OF ASCENDING AORTA WITHOUT RUPTURE: ICD-10-CM

## 2025-07-24 LAB
ECHO AO ASC DIAM: 4.5 CM
ECHO AO ASCENDING AORTA INDEX: 1.94 CM/M2
ECHO AO ROOT DIAM: 4.4 CM
ECHO AO ROOT INDEX: 1.9 CM/M2
ECHO AV CUSP MM: 2.4 CM
ECHO AV MEAN GRADIENT: 5 MMHG
ECHO AV MEAN VELOCITY: 1 M/S
ECHO AV PEAK GRADIENT: 9 MMHG
ECHO AV PEAK GRADIENT: 9 MMHG
ECHO AV PEAK VELOCITY: 1.5 M/S
ECHO AV VELOCITY RATIO: 0.53
ECHO AV VTI: 41.3 CM
ECHO BSA: 2.37 M2
ECHO EST RA PRESSURE: 3 MMHG
ECHO LA AREA 2C: 22.1 CM2
ECHO LA AREA 4C: 15.9 CM2
ECHO LA DIAMETER INDEX: 1.51 CM/M2
ECHO LA DIAMETER: 3.5 CM
ECHO LA MAJOR AXIS: 5.8 CM
ECHO LA MINOR AXIS: 6 CM
ECHO LA TO AORTIC ROOT RATIO: 0.8
ECHO LA VOL BP: 50 ML (ref 18–58)
ECHO LA VOL MOD A2C: 66 ML (ref 18–58)
ECHO LA VOL MOD A4C: 35 ML (ref 18–58)
ECHO LA VOL/BSA BIPLANE: 22 ML/M2 (ref 16–34)
ECHO LA VOLUME INDEX MOD A2C: 28 ML/M2 (ref 16–34)
ECHO LA VOLUME INDEX MOD A4C: 15 ML/M2 (ref 16–34)
ECHO LV E' LATERAL VELOCITY: 10.4 CM/S
ECHO LV E' SEPTAL VELOCITY: 10 CM/S
ECHO LV EDV 3D: 137 ML
ECHO LV EDV INDEX 3D: 59 ML/M2
ECHO LV EF PHYSICIAN: 58 %
ECHO LV EJECTION FRACTION 3D: 58 %
ECHO LV ESV 3D: 57 ML
ECHO LV ESV INDEX 3D: 25 ML/M2
ECHO LV FRACTIONAL SHORTENING: 36 % (ref 28–44)
ECHO LV GLOBAL LONGITUDINAL STRAIN (GLS): -17.8 %
ECHO LV GLOBAL LONGITUDINAL STRAIN (GLS): -18.3 %
ECHO LV GLOBAL LONGITUDINAL STRAIN (GLS): -19.6 %
ECHO LV GLOBAL LONGITUDINAL STRAIN (GLS): -22.9 %
ECHO LV INTERNAL DIMENSION DIASTOLE INDEX: 2.03 CM/M2
ECHO LV INTERNAL DIMENSION DIASTOLIC: 4.7 CM (ref 4.2–5.9)
ECHO LV INTERNAL DIMENSION SYSTOLIC INDEX: 1.29 CM/M2
ECHO LV INTERNAL DIMENSION SYSTOLIC: 3 CM
ECHO LV ISOVOLUMETRIC RELAXATION TIME (IVRT): 100 MS
ECHO LV IVSD: 0.9 CM (ref 0.6–1)
ECHO LV MASS 2D: 153.4 G (ref 88–224)
ECHO LV MASS 3D INDEX: 94 G/M2
ECHO LV MASS 3D: 218 G
ECHO LV MASS INDEX 2D: 66.1 G/M2 (ref 49–115)
ECHO LV POSTERIOR WALL DIASTOLIC: 1 CM (ref 0.6–1)
ECHO LV RELATIVE WALL THICKNESS RATIO: 0.43
ECHO LVOT AV VTI INDEX: 0.49
ECHO LVOT MEAN GRADIENT: 1 MMHG
ECHO LVOT PEAK GRADIENT: 2 MMHG
ECHO LVOT PEAK VELOCITY: 0.8 M/S
ECHO LVOT VTI: 20.3 CM
ECHO MV A VELOCITY: 0.91 M/S
ECHO MV E DECELERATION TIME (DT): 246 MS
ECHO MV E VELOCITY: 0.84 M/S
ECHO MV E/A RATIO: 0.92
ECHO MV E/E' LATERAL: 8.08
ECHO MV E/E' RATIO (AVERAGED): 8.24
ECHO MV E/E' SEPTAL: 8.4
ECHO RA AREA 4C: 17.6 CM2
ECHO RA END SYSTOLIC VOLUME APICAL 4 CHAMBER INDEX BSA: 20 ML/M2
ECHO RA VOLUME: 46 ML
ECHO RIGHT VENTRICULAR SYSTOLIC PRESSURE (RVSP): 26 MMHG
ECHO RV FREE WALL PEAK S': 12.1 CM/S
ECHO RV TAPSE: 2.2 CM (ref 1.7–?)
ECHO TV REGURGITANT MAX VELOCITY: 2.38 M/S
ECHO TV REGURGITANT PEAK GRADIENT: 23 MMHG
PERFORMED ON: NORMAL
POC CREATININE: 1.1 MG/DL (ref 0.8–1.3)
POC SAMPLE TYPE: NORMAL

## 2025-07-24 PROCEDURE — 93356 MYOCRD STRAIN IMG SPCKL TRCK: CPT | Performed by: INTERNAL MEDICINE

## 2025-07-24 PROCEDURE — 6360000004 HC RX CONTRAST MEDICATION: Performed by: THORACIC SURGERY (CARDIOTHORACIC VASCULAR SURGERY)

## 2025-07-24 PROCEDURE — 93306 TTE W/DOPPLER COMPLETE: CPT

## 2025-07-24 PROCEDURE — 82565 ASSAY OF CREATININE: CPT

## 2025-07-24 PROCEDURE — 71275 CT ANGIOGRAPHY CHEST: CPT

## 2025-07-24 PROCEDURE — 93306 TTE W/DOPPLER COMPLETE: CPT | Performed by: INTERNAL MEDICINE

## 2025-07-24 RX ORDER — IOPAMIDOL 755 MG/ML
75 INJECTION, SOLUTION INTRAVASCULAR
Status: COMPLETED | OUTPATIENT
Start: 2025-07-24 | End: 2025-07-24

## 2025-07-24 RX ADMIN — IOPAMIDOL 75 ML: 755 INJECTION, SOLUTION INTRAVENOUS at 11:27

## 2025-07-29 ENCOUNTER — TELEPHONE (OUTPATIENT)
Age: 75
End: 2025-07-29

## 2025-07-29 DIAGNOSIS — I71.21 ANEURYSM OF ASCENDING AORTA WITHOUT RUPTURE: Primary | ICD-10-CM

## 2025-07-29 NOTE — TELEPHONE ENCOUNTER
CT results reviewed by Dr. Ruiz. KAYLIE bahena. Repeat noncontrast CT chest in one year along with a TTE. Pt notified of results. Questions answered Pt placed into surveillance. Orders placed in Epic.

## 2025-08-04 ENCOUNTER — TELEPHONE (OUTPATIENT)
Dept: CARDIOLOGY CLINIC | Age: 75
End: 2025-08-04

## 2025-08-12 DIAGNOSIS — I48.91 NEW ONSET A-FIB (HCC): ICD-10-CM

## 2025-08-12 RX ORDER — AMIODARONE HYDROCHLORIDE 200 MG/1
200 TABLET ORAL SEE ADMIN INSTRUCTIONS
Qty: 90 TABLET | Refills: 1 | Status: SHIPPED | OUTPATIENT
Start: 2025-08-12

## (undated) DEVICE — GLOVE ORTHO 7 1/2   MSG9475

## (undated) DEVICE — GLOVE ORANGE PI 7   MSG9070

## (undated) DEVICE — STANDARD HYPODERMIC NEEDLE,POLYPROPYLENE HUB: Brand: MONOJECT

## (undated) DEVICE — PLATE ES AD W 9FT CRD 2

## (undated) DEVICE — ADHESIVE SKIN CLOSURE WND 8.661X1.5 IN 22 CM LIQUIBAND SECUR

## (undated) DEVICE — PENCIL SMK EVAC ALL IN 1 DSGN ENH VISIBILITY IMPROVED AIR

## (undated) DEVICE — BOWL AND CEMENT CARTRIDGE WITH BREAKAWAY FEMORAL NOZZLE AND MEDIUM PRESSURIZER: Brand: ACM

## (undated) DEVICE — STOCKINETTE,IMPERVIOUS,12X48,STERILE: Brand: MEDLINE

## (undated) DEVICE — SPONGE LAP W18XL18IN WHT COT 4 PLY FLD STRUNG RADPQ DISP ST 2 PER PACK

## (undated) DEVICE — GOWN,SIRUS,NON REINFRCD,LARGE,SET IN SL: Brand: MEDLINE

## (undated) DEVICE — 2.0MM WIRE PASS DRILL BIT

## (undated) DEVICE — SUTURE STRATAFIX SPRL SZ 3-0 L12IN ABSRB UD FS-1 L30X30CM SXMP2B410

## (undated) DEVICE — TUBING, SUCTION, 3/16" X 12', STRAIGHT: Brand: MEDLINE

## (undated) DEVICE — ORTHO PRE OP PACK: Brand: MEDLINE INDUSTRIES, INC.

## (undated) DEVICE — HANDPIECE SET WITH HIGH FLOW TIP AND SUCTION TUBE: Brand: INTERPULSE

## (undated) DEVICE — OPTIFOAM GENTLE SA, POSTOP, 4X8: Brand: MEDLINE

## (undated) DEVICE — SUTURE ETHBND EXCEL SZ 0 L18IN NONABSORBABLE GRN L36MM CT-1 CX21D

## (undated) DEVICE — YANKAUER,BULB TIP,W/O VENT,RIGID,STERILE: Brand: MEDLINE

## (undated) DEVICE — HANDPIECE SET WITH SUCTION TUBING: Brand: INTERPULSE

## (undated) DEVICE — YANKAUER SUCTION INSTRUMENT WITHOUT CONTROL VENT, OPEN TIP, CLEAR: Brand: YANKAUER

## (undated) DEVICE — ZIMMER® STERILE DISPOSABLE TOURNIQUET CUFF WITH PLC, DUAL PORT, SINGLE BLADDER, 30 IN. (76 CM)

## (undated) DEVICE — INTENDED FOR TISSUE SEPARATION, AND OTHER PROCEDURES THAT REQUIRE A SHARP SURGICAL BLADE TO PUNCTURE OR CUT.: Brand: BARD-PARKER ® CARBON RIB-BACK BLADES

## (undated) DEVICE — PACK PROCEDURE SURG TOTAL KNEE

## (undated) DEVICE — 3M™ STERI-DRAPE™ U-DRAPE 1015: Brand: STERI-DRAPE™

## (undated) DEVICE — GLOVE SURG SZ 8 L12IN FNGR THK87MIL WHT LTX FREE

## (undated) DEVICE — KNEE HOLDER DISPOSABLE LINER: Brand: ALVARADO®  KNEE SUPPORT

## (undated) DEVICE — HANDPIECE SUCTION TUBING INTERPULSE 10FT

## (undated) DEVICE — PADDING CAST W6INXL4YD ST COT RAYON MICROPLEATED HIGHLY

## (undated) DEVICE — HIGH FLOW TIP

## (undated) DEVICE — DRAPE C ARM W46XL120IN XLN

## (undated) DEVICE — SUTURE MCRYL SZ 4-0 L27IN ABSRB UD L19MM PS-2 1/2 CIR PRIM Y426H

## (undated) DEVICE — TURNOVER KIT RM INF CTRL TECH

## (undated) DEVICE — COVER LT HNDL BLU PLAS

## (undated) DEVICE — GARMENT,MEDLINE,DVT,INT,CALF,MED, GEN2: Brand: MEDLINE

## (undated) DEVICE — COAXIAL HIGH FLOW TIP WITH SOFT SHIELD

## (undated) DEVICE — 450 ML BOTTLE OF 0.05% CHLORHEXIDINE GLUCONATE IN 99.95% STERILE WATER FOR IRRIGATION, USP AND APPLICATOR.: Brand: IRRISEPT ANTIMICROBIAL WOUND LAVAGE

## (undated) DEVICE — C-ARMOR C-ARM EQUIPMENT COVERS CLEAR STERILE UNIVERSAL FIT 12 PER CASE: Brand: C-ARMOR

## (undated) DEVICE — SOLUTION IV 1000ML 0.9% SOD CHL

## (undated) DEVICE — SUTURE STRATAFIX SPRL MCRYL + SZ 2-0 L18IN ABSRB UD CT-1 SXMP1B413

## (undated) DEVICE — E-Z CLEAN, NON-STICK, PTFE COATED, ELECTROSURGICAL BLADE ELECTRODE, 2.5 INCH (6.35 CM): Brand: EZ CLEAN

## (undated) DEVICE — SUTURE FIBERWIRE SZ 5 L38IN NONABSORBABLE BLU L48MM 1/2 AR7211

## (undated) DEVICE — PACK EXTREMITY XR

## (undated) DEVICE — GLOVE SURG SZ 75 L12IN FNGR THK79MIL GRN LTX FREE

## (undated) DEVICE — SUTURE VCRL SZ 2-0 L18IN ABSRB UD CT-1 L36MM 1/2 CIR J839D

## (undated) DEVICE — DRILL TIP PASSING PIN 2.7 MM X 15                                    INCH, STERILE 6 PER BOX: Brand: ENDOBUTTON

## (undated) DEVICE — BLADE SURG NO10 S STL STR DISP GLASSVAN

## (undated) DEVICE — BANDAGE COMPR M W6INXL10YD WHT BGE VELC E MTRX HK AND LOOP

## (undated) DEVICE — DECANTER BAG 9": Brand: MEDLINE INDUSTRIES, INC.

## (undated) DEVICE — ZIMMER® STERILE DISPOSABLE TOURNIQUET CUFF WITH PLC, DUAL PORT, SINGLE BLADDER, 34 IN. (86 CM)

## (undated) DEVICE — SUTURE VCRL SZ 0 L18IN ABSRB UD L36MM CT-1 1/2 CIR J840D

## (undated) DEVICE — SURE SET-DOUBLE BASIN-LF: Brand: MEDLINE INDUSTRIES, INC.

## (undated) DEVICE — Z DISCONTINUED PER MEDLINE USE 2741943 DRESSING AQUACEL 10 IN ALG W9XL25CM SIL CVR WTRPRF VIR BACT BARR ANTIMIC

## (undated) DEVICE — SUTURE VCRL + SZ 0 L18IN ABSRB UD L36MM CT-1 1/2 CIR VCP840D

## (undated) DEVICE — BANDAGE COMPR M W6INXL15YD WHT BGE VELC E MTRX HK AND LOOP

## (undated) DEVICE — SOLUTION IV IRRIG LACTATED RINGERS 3000ML 2B7487

## (undated) DEVICE — CHLORAPREP 26ML ORANGE

## (undated) DEVICE — 3M™ WARMING BLANKET, UPPER BODY, 10 PER CASE, 42268: Brand: BAIR HUGGER™

## (undated) DEVICE — PEEL-AWAY HOOD: Brand: FLYTE, SURGICOOL

## (undated) DEVICE — GOWN,SIRUS,POLYRNF,BRTHSLV,XLN/XXL,18/CS: Brand: MEDLINE

## (undated) DEVICE — 3M™ COBAN™ NL STERILE NON-LATEX SELF-ADHERENT WRAP, 2086S, 6 IN X 5 YD (15 CM X 4,5 M), 12 ROLLS/CASE: Brand: 3M™ COBAN™

## (undated) DEVICE — BANDAGE,GAUZE,4.5"X4.1YD,STERILE,LF: Brand: MEDLINE